# Patient Record
Sex: FEMALE | Race: WHITE | Employment: OTHER | RURAL
[De-identification: names, ages, dates, MRNs, and addresses within clinical notes are randomized per-mention and may not be internally consistent; named-entity substitution may affect disease eponyms.]

---

## 2017-09-27 LAB
CREATININE, EXTERNAL: NORMAL
HBA1C MFR BLD HPLC: NORMAL %
LDL-C, EXTERNAL: NORMAL

## 2017-12-22 ENCOUNTER — OFFICE VISIT (OUTPATIENT)
Dept: INTERNAL MEDICINE CLINIC | Age: 52
End: 2017-12-22

## 2017-12-22 VITALS
WEIGHT: 175 LBS | HEART RATE: 69 BPM | SYSTOLIC BLOOD PRESSURE: 122 MMHG | HEIGHT: 63 IN | DIASTOLIC BLOOD PRESSURE: 72 MMHG | RESPIRATION RATE: 18 BRPM | TEMPERATURE: 97.3 F | OXYGEN SATURATION: 99 % | BODY MASS INDEX: 31.01 KG/M2

## 2017-12-22 DIAGNOSIS — E03.9 ACQUIRED HYPOTHYROIDISM: ICD-10-CM

## 2017-12-22 DIAGNOSIS — L08.9 INFECTION OF THUMB: Primary | ICD-10-CM

## 2017-12-22 DIAGNOSIS — Z76.89 ENCOUNTER TO ESTABLISH CARE: ICD-10-CM

## 2017-12-22 DIAGNOSIS — R31.21 ASYMPTOMATIC MICROSCOPIC HEMATURIA: ICD-10-CM

## 2017-12-22 LAB
BILIRUB UR QL STRIP: NEGATIVE
GLUCOSE UR-MCNC: NEGATIVE MG/DL
KETONES P FAST UR STRIP-MCNC: NEGATIVE MG/DL
PH UR STRIP: 6.5 [PH] (ref 4.6–8)
PROT UR QL STRIP: NEGATIVE
SP GR UR STRIP: 1.01 (ref 1–1.03)
UA UROBILINOGEN AMB POC: NORMAL (ref 0.2–1)
URINALYSIS CLARITY POC: CLEAR
URINALYSIS COLOR POC: YELLOW
URINE BLOOD POC: NORMAL
URINE LEUKOCYTES POC: NEGATIVE
URINE NITRITES POC: NEGATIVE

## 2017-12-22 RX ORDER — FLUTICASONE PROPIONATE 50 MCG
SPRAY, SUSPENSION (ML) NASAL
Refills: 2 | COMMUNITY
Start: 2017-10-30 | End: 2018-05-22 | Stop reason: SDUPTHER

## 2017-12-22 RX ORDER — LAMOTRIGINE 200 MG/1
TABLET ORAL
Refills: 0 | COMMUNITY
Start: 2017-12-01 | End: 2019-01-30 | Stop reason: SDUPTHER

## 2017-12-22 RX ORDER — TRIAMCINOLONE ACETONIDE 1 MG/G
PASTE DENTAL
Refills: 0 | COMMUNITY
Start: 2017-11-17 | End: 2017-12-22 | Stop reason: ALTCHOICE

## 2017-12-22 RX ORDER — CEPHALEXIN 500 MG/1
500 CAPSULE ORAL 2 TIMES DAILY
Qty: 20 CAP | Refills: 0 | Status: SHIPPED | OUTPATIENT
Start: 2017-12-22 | End: 2018-01-01

## 2017-12-22 RX ORDER — LOSARTAN POTASSIUM 25 MG/1
TABLET ORAL
Refills: 0 | COMMUNITY
Start: 2017-12-12 | End: 2018-03-06 | Stop reason: SDUPTHER

## 2017-12-22 NOTE — PROGRESS NOTES
HISTORY OF PRESENT ILLNESS  Silver Weir is a 47 y.o. female. HPI     Chief Complaint   Patient presents with    New Patient     ESTABLISH CARE    Finger Swelling     X 2 WEEKS;  POSSIBLY SPLINTER; REDNESS; SORE     Last PCP  Dr. 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 Emery Lobato [facility].  Migraine     2014 - 2015    PTSD (post-traumatic stress disorder)     2014 - 2015    Tendonitis     Both feet/    Thyroid disease     20 year ago     Social History     Social History    Marital status:      Spouse name: N/A    Number of children: N/A    Years of education: N/A     Occupational History    Not on file. Social History Main Topics    Smoking status: Never Smoker    Smokeless tobacco: Never Used    Alcohol use No    Drug use: Yes     Special: Prescription, OTC    Sexual activity: No     Other Topics Concern    Not on file     Social History Narrative     Review of Systems   Constitutional: Negative for chills, fever and malaise/fatigue. HENT: Negative. Eyes: Negative. Respiratory: Negative. Negative for cough, sputum production, shortness of breath and wheezing. Cardiovascular: Negative. Negative for chest pain and leg swelling. Gastrointestinal: Negative for constipation, diarrhea, nausea and vomiting. Genitourinary: Negative. Musculoskeletal: Negative. Skin:        Right thumb swollen, painful , thinks had splinter in it. Neurological: Negative. Physical Exam   Constitutional: She is oriented to person, place, and time. She appears well-developed and well-nourished. No distress.    HENT:   Head: Normocephalic and atraumatic. Eyes: Conjunctivae are normal.   Cardiovascular: Normal rate, regular rhythm, normal heart sounds and intact distal pulses. Exam reveals no gallop and no friction rub. No murmur heard. Pulmonary/Chest: Effort normal and breath sounds normal. No respiratory distress. She has no wheezes. She has no rales. Musculoskeletal: Normal range of motion. She exhibits no edema, tenderness or deformity. Neurological: She is alert and oriented to person, place, and time. Skin: Skin is warm and dry. She is not diaphoretic. There is erythema. Positive swelling, pain , erythema and warmth right    Nursing note and vitals reviewed. Plan of care and AVS reviewed with patient who verbalized understanding. ASSESSMENT and PLAN    ICD-10-CM ICD-9-CM    1. Infection of thumb L08.9 686.9 cephALEXin (KEFLEX) 500 mg capsule   2. Asymptomatic microscopic hematuria R31.21 599.72 CULTURE, URINE   3. Encounter to establish care Z76.89 V65.8 AMB POC URINALYSIS DIP STICK MANUAL W/O MICRO   4. Acquired hypothyroidism E03.9 244.9 TSH 3RD GENERATION   Will notify patient of lab results.   Started Keflex 500 mg.  F/U 2 months physical exam.

## 2017-12-22 NOTE — PROGRESS NOTES
Chief Complaint   Patient presents with    New Patient     ESTABLISH CARE    Finger Swelling     X 2 WEEKS;  POSSIBLY SPLINTER; REDNESS; SORE     Health Maintenance Due   Topic Date Due    Hepatitis C Screening  1965    DTaP/Tdap/Td series (1 - Tdap) 07/21/1985    PAP AKA CERVICAL CYTOLOGY  07/21/1985    FOBT Q 1 YEAR AGE 50-75  07/21/2014    Influenza Age 9 to Adult  08/01/2017     Patient had flu vaccine and pap at [facility] ([facility])    Last PCP:  Dr. Terrell Corona  [address] [place] [address]  P: [phone]  F: [phone]     Do you have an [address] in place in the event that you have a healthcare crisis that could impact your decision making as it pertains to your health? NO    Would you like information about Advance Care Planning? NO    Information given.  NO

## 2017-12-22 NOTE — MR AVS SNAPSHOT
Visit Information Date & Time Provider Department Dept. Phone Encounter #  
 12/22/2017  9:30 AM Carolyn Reza, 1 [facility] [id number] Follow-up Instructions Return in about 2 months (around 2/22/2018) for Physical Exam. Upcoming Health Maintenance Date Due Hepatitis C Screening 1965 DTaP/Tdap/Td series (1 - Tdap) 7/21/1985 PAP AKA CERVICAL CYTOLOGY 7/21/1985 FOBT Q 1 YEAR AGE 50-75 7/21/2014 Allergies as of 12/22/2017  Review Complete On: 12/22/2017 By: Carolyn Reza NP Severity Noted Reaction Type Reactions Penicillins  10/23/2017    Rash Current Immunizations  Never Reviewed No immunizations on file. Not reviewed this visit You Were Diagnosed With   
  
 Codes Comments Asymptomatic microscopic hematuria    -  Primary ICD-10-CM: R31.21 
ICD-9-CM: 599.72 Encounter to establish care     ICD-10-CM: Z76.89 
ICD-9-CM: V65.8 Acquired hypothyroidism     ICD-10-CM: E03.9 ICD-9-CM: 209. 9 Vitals BP Pulse Temp Resp Height(growth percentile) 122/72 (BP 1 Location: Left arm, BP Patient Position: Sitting) 69 97.3 °F (36.3 °C) (Temporal) 18 5' 3\" (1.6 m) Weight(growth percentile) SpO2 BMI OB Status Smoking Status 175 lb (79.4 kg) 99% 31 kg/m2 Postmenopausal Never Smoker BMI and BSA Data Body Mass Index Body Surface Area  
 31 kg/m 2 1.88 m 2 Preferred Pharmacy Pharmacy Name Phone [facility] [address] [place] [address], [address] [place] [address] [address] Your Updated Medication List  
  
   
This list is accurate as of: 12/22/17 10:07 AM.  Always use your most recent med list.  
  
  
  
  
 AMBIEN 10 mg tablet Generic drug:  zolpidem Take 10 mg by mouth nightly as needed for Sleep. atorvastatin 20 mg tablet Commonly known as:  LIPITOR Take 20 mg by mouth daily. busPIRone 30 mg tablet Commonly known as:  BUSPAR  
 Take 30 mg by mouth two (2) times a day. fluticasone 50 mcg/actuation nasal spray Commonly known as:  FLONASE  
  
 GEODON 20 mg capsule Generic drug:  ziprasidone Take 20 mg by mouth two (2) times daily (with meals). * lamoTRIgine 100 mg tablet Commonly known as: LaMICtal  
Take 300 mg by mouth daily. * lamoTRIgine 200 mg tablet Commonly known as: LaMICtal  
take 1 tablet by mouth once daily TAKE WITH 100 milligram tablet  
  
 levothyroxine 75 mcg tablet Commonly known as:  SYNTHROID Take 75 mcg by mouth Daily (before breakfast). losartan 25 mg tablet Commonly known as:  COZAAR  
take 1 tablet by mouth once daily VIIBRYD 40 mg Tab tablet Generic drug:  vilazodone Take 40 mg by mouth daily. * Notice: This list has 2 medication(s) that are the same as other medications prescribed for you. Read the directions carefully, and ask your doctor or other care provider to review them with you. We Performed the Following AMB POC URINALYSIS DIP STICK MANUAL W/O MICRO [57741 CPT(R)] CULTURE, URINE [id number] CPT(R)] TSH 3RD GENERATION [43644 CPT(R)] Follow-up Instructions Return in about 2 months (around 2/22/2018) for Physical Exam.  
  
  
Introducing [facility]! [facility] introduces [employer] patient portal. Now you can access parts of your medical record, email your doctor's office, and request medication refills online. 1. In your internet browser, go to [email or web address] 2. Click on the First Time User? Click Here link in the Sign In box. You will see the New Member Sign Up page. 3. Enter your [employer] Access Code exactly as it appears below. You will not need to use this code after youve completed the sign-up process. If you do not sign up before the expiration date, you must request a new code. · [employer] Access Code: [email or web address] Expires: 1/21/2018  7:42 PM 
 
 4. Enter the last four digits of your Social Security Number (xxxx) and Date of Birth (mm/dd/yyyy) as indicated and click Submit. You will be taken to the next sign-up page. 5. Create a "[employer] ID. This will be your "[employer] login ID and cannot be changed, so think of one that is secure and easy to remember. 6. Create a "[employer] password. You can change your password at any time. 7. Enter your Password Reset Question and Answer. This can be used at a later time if you forget your password. 8. Enter your e-mail address. You will receive e-mail notification when new information is available in [address] 9. Click Sign Up. You can now view and download portions of your medical record. 10. Click the Download Summary menu link to download a portable copy of your medical information. If you have questions, please visit the Frequently Asked Questions section of the "[employer] website. Remember, "[employer] is NOT to be used for urgent needs. For medical emergencies, dial 911. Now available from your iPhone and Android! Please provide this summary of care documentation to your next provider. Your primary care clinician is listed as Raz Poole. If you have any questions after today's visit, please call [phone].

## 2017-12-24 LAB
BACTERIA UR CULT: NORMAL
TSH SERPL DL<=0.005 MIU/L-ACNC: 8.36 UIU/ML (ref 0.45–4.5)

## 2017-12-26 DIAGNOSIS — E03.9 ACQUIRED HYPOTHYROIDISM: Primary | ICD-10-CM

## 2017-12-26 RX ORDER — LEVOTHYROXINE SODIUM 88 UG/1
88 TABLET ORAL
Qty: 30 TAB | Refills: 1 | Status: SHIPPED | OUTPATIENT
Start: 2017-12-26 | End: 2018-02-02 | Stop reason: SDUPTHER

## 2017-12-26 NOTE — PROGRESS NOTES
Discussed with patient lab results.   Thy;roid medication increased and patient to come in 6 weeks to get repeat TSH

## 2017-12-29 DIAGNOSIS — G43.909 MIGRAINE WITHOUT STATUS MIGRAINOSUS, NOT INTRACTABLE, UNSPECIFIED MIGRAINE TYPE: Primary | ICD-10-CM

## 2017-12-29 RX ORDER — RIZATRIPTAN BENZOATE 10 MG/1
10 TABLET ORAL
Qty: 9 TAB | Refills: 0 | Status: SHIPPED | OUTPATIENT
Start: 2017-12-29 | End: 2018-05-22 | Stop reason: SDUPTHER

## 2018-01-04 ENCOUNTER — TELEPHONE (OUTPATIENT)
Dept: INTERNAL MEDICINE CLINIC | Age: 53
End: 2018-01-04

## 2018-01-04 NOTE — TELEPHONE ENCOUNTER
Patient called and I advised her that an appointment would need to be scheduled to be evaluated for the back pain by Dr Dm Easley [facility] - appt scheduled for 1/5/18  Ibeth Oconnor LPN  2/5/8549  8:93 PM

## 2018-01-04 NOTE — TELEPHONE ENCOUNTER
----- Message from Lana Morataya sent at 1/4/2018  8:08 AM EST -----  Regarding: NP Lopez/Telephone  Pt requesting for a muscle relaxer to be sent to pharmacy due to falling on back. Pt would like to speak to the nurse or doctor before making an appt. In addition, pt states that she is in excruciating pain and unable to sleep.     Best contact number: [phone]  [facility]: [phone]

## 2018-01-08 ENCOUNTER — OFFICE VISIT (OUTPATIENT)
Dept: INTERNAL MEDICINE CLINIC | Age: 53
End: 2018-01-08

## 2018-01-08 VITALS
TEMPERATURE: 97.2 F | HEIGHT: 63 IN | OXYGEN SATURATION: 96 % | WEIGHT: 174 LBS | DIASTOLIC BLOOD PRESSURE: 77 MMHG | HEART RATE: 72 BPM | SYSTOLIC BLOOD PRESSURE: 116 MMHG | RESPIRATION RATE: 17 BRPM | BODY MASS INDEX: 30.83 KG/M2

## 2018-01-08 DIAGNOSIS — M79.89 THUMB SWELLING: Primary | ICD-10-CM

## 2018-01-08 RX ORDER — DOXYCYCLINE 100 MG/1
100 TABLET ORAL 2 TIMES DAILY
Qty: 20 TAB | Refills: 0 | Status: SHIPPED | OUTPATIENT
Start: 2018-01-08 | End: 2018-01-18

## 2018-01-08 NOTE — PROGRESS NOTES
HISTORY OF PRESENT ILLNESS  Gi Tam is a 47 y.o. female. HPI  Chief Complaint   Patient presents with    Finger Swelling     LEFT THUMB     States took all medication for infection;  States still having swelling in right thumb  States tried poking to make drainage to come out. States no drainage comes out. States is still sore. States still not sure how injured finger. Review of Systems   Constitutional: Negative. Negative for chills, fever and malaise/fatigue. HENT: Negative. Eyes: Negative. Respiratory: Negative. Negative for cough and shortness of breath. Cardiovascular: Negative. Negative for chest pain and leg swelling. Gastrointestinal: Negative. Negative for abdominal pain, constipation, diarrhea, nausea and vomiting. Genitourinary: Negative. Musculoskeletal: Negative. Skin: Negative. Neurological: Negative. Physical Exam   Constitutional: She is oriented to person, place, and time. She appears well-developed and well-nourished. No distress. HENT:   Head: Normocephalic and atraumatic. Eyes: Conjunctivae are normal.   Cardiovascular: Normal rate, regular rhythm and intact distal pulses. Exam reveals no gallop and no friction rub. No murmur heard. Pulmonary/Chest: Effort normal and breath sounds normal. No respiratory distress. She has no wheezes. She has no rales. Musculoskeletal: Normal range of motion. Neurological: She is alert and oriented to person, place, and time. Skin: Skin is warm and dry. She is not diaphoretic. Psychiatric: She has a normal mood and affect. Her behavior is normal. Thought content normal.   Nursing note and vitals reviewed. Plan of care and AVS reviewed with patient who verbalized understanding. ASSESSMENT and PLAN    ICD-10-CM ICD-9-CM    1.  Thumb swelling M79.89 729.81 CULTURE, ANAEROBIC AND AEROBIC      doxycycline (ADOXA) 100 mg tablet      XR THUMB RT MIN 2 V      SPECIMEN HANDLING, OFF->LAB   Will notify patient of culture results. Discussed with patient use of doxycycline. Will notify patient of lab results. Discussed the patient's BMI with her. The BMI follow up plan is as follows:     dietary management education, guidance, and counseling  encourage exercise  monitor weight  prescribed dietary intake    An After Visit Summary was printed and given to the patient.

## 2018-01-08 NOTE — PROGRESS NOTES
Chief Complaint   Patient presents with    Finger Swelling     LEFT THUMB     1. Have you been to the ER, urgent care clinic since your last visit? Hospitalized since your last visit? No    2. Have you seen or consulted any other health care providers outside of the [address] [place] [address] since your last visit? Include any pap smears or colon screening. No     Health Maintenance Due   Topic Date Due    BREAST CANCER SCRN MAMMOGRAM  07/21/2014     Do you have an [address] in place in the event that you have a healthcare crisis that could impact your decision making as it pertains to your health? NO    Would you like information about Advance Care Planning? NO    Information given.  NO

## 2018-01-08 NOTE — MR AVS SNAPSHOT
Visit Information Date & Time Provider Department Dept. Phone Encounter #  
 1/8/2018  9:00 AM Boy Oglesby, 1 [facility] [phone] Your Appointments 2/22/2018 10:30 AM  
PHYSICAL PRE OP with MENG Johnson Mary (SHERLYN SCHEDULING) Appt Note: physical $0 cp lsh 12/22/17  
 [address] [place] [address]  
[phone]  
  
   
 [address] Upcoming Health Maintenance Date Due  
 BREAST CANCER SCRN MAMMOGRAM 7/21/2014 PAP AKA CERVICAL CYTOLOGY 2/15/2018* FOBT Q 1 YEAR AGE 50-75 3/30/2018* DTaP/Tdap/Td series (2 - Td) 12/22/2027 *Topic was postponed. The date shown is not the original due date. Allergies as of 1/8/2018  Review Complete On: 1/8/2018 By: Boy Oglesby NP Severity Noted Reaction Type Reactions Penicillins  10/23/2017    Rash Current Immunizations  Never Reviewed No immunizations on file. Not reviewed this visit You Were Diagnosed With   
  
 Codes Comments Thumb swelling    -  Primary ICD-10-CM: M79.89 ICD-9-CM: 729.81 Vitals BP Pulse Temp Resp Height(growth percentile) 116/77 (BP 1 Location: Left arm, BP Patient Position: Sitting) 72 97.2 °F (36.2 °C) (Temporal) 17 5' 3\" (1.6 m) Weight(growth percentile) SpO2 BMI OB Status Smoking Status 174 lb (78.9 kg) 96% 30.82 kg/m2 Postmenopausal Never Smoker Vitals History BMI and BSA Data Body Mass Index Body Surface Area  
 30.82 kg/m 2 1.87 m 2 Preferred Pharmacy Pharmacy Name Phone [facility] [address] [place] [address] [facility] [address] Your Updated Medication List  
  
   
This list is accurate as of: 1/8/18  9:36 AM.  Always use your most recent med list.  
  
  
  
  
 AMBIEN 10 mg tablet Generic drug:  zolpidem Take 10 mg by mouth nightly as needed for Sleep. atorvastatin 20 mg tablet Commonly known as:  LIPITOR Take 20 mg by mouth daily. busPIRone 30 mg tablet Commonly known as:  BUSPAR Take 30 mg by mouth two (2) times a day. doxycycline 100 mg tablet Commonly known as:  ADOXA Take 1 Tab by mouth two (2) times a day for 10 days. fluticasone 50 mcg/actuation nasal spray Commonly known as:  FLONASE  
  
 GEODON 20 mg capsule Generic drug:  ziprasidone Take 20 mg by mouth two (2) times daily (with meals). * lamoTRIgine 100 mg tablet Commonly known as: LaMICtal  
Take 300 mg by mouth daily. * lamoTRIgine 200 mg tablet Commonly known as: LaMICtal  
take 1 tablet by mouth once daily TAKE WITH 100 milligram tablet  
  
 levothyroxine 88 mcg tablet Commonly known as:  SYNTHROID Take 1 Tab by mouth Daily (before breakfast). losartan 25 mg tablet Commonly known as:  COZAAR  
take 1 tablet by mouth once daily  
  
 rizatriptan 10 mg tablet Commonly known as:  Daved Better Take 1 Tab by mouth once as needed for Migraine for up to 1 dose. May repeat in 2 hours if needed VIIBRYD 40 mg Tab tablet Generic drug:  vilazodone Take 40 mg by mouth daily. * Notice: This list has 2 medication(s) that are the same as other medications prescribed for you. Read the directions carefully, and ask your doctor or other care provider to review them with you. Prescriptions Sent to Pharmacy Refills  
 doxycycline (ADOXA) 100 mg tablet 0 Sig: Take 1 Tab by mouth two (2) times a day for 10 days. Class: Normal  
 Pharmacy: [id number] [place] [address] Ph #: [phone] Route: Oral  
  
We Performed the Following CULTURE, ANAEROBIC AND AEROBIC [id number] CPT(R)] To-Do List   
 01/08/2018 Imaging:  XR THUMB RT MIN 2 V Introducing [facility]! Dear Gabino Morganter: Thank you for requesting a [employer] account. Our records indicate that you already have an active [employer] Here account. You can access your account anytime at [email or web address] Did you know that you can access your hospital and ER discharge instructions at any time in [employer] Here? You can also review all of your test results from your hospital stay or ER visit. Additional Information If you have questions, please visit the Frequently Asked Questions section of the [employer] Here website at [email or web address] Remember, [employer] is NOT to be used for urgent needs. For medical emergencies, dial 911. Now available from your iPhone and Android! Please provide this summary of care documentation to your next provider. Your primary care clinician is listed as Verona Boateng. If you have any questions after today's visit, please call [phone].

## 2018-01-08 NOTE — PATIENT INSTRUCTIONS
Body Mass Index: Care Instructions  Your Care Instructions    Body mass index (BMI) can help you see if your weight is raising your risk for health problems. It uses a formula to compare how much you weigh with how tall you are. · A BMI lower than 18.5 is considered underweight. · A BMI between 18.5 and 24.9 is considered healthy. · A BMI between 25 and 29.9 is considered overweight. A BMI of 30 or higher is considered obese. If your BMI is in the normal range, it means that you have a lower risk for weight-related health problems. If your BMI is in the overweight or obese range, you may be at increased risk for weight-related health problems, such as high blood pressure, heart disease, stroke, arthritis or joint pain, and diabetes. If your BMI is in the underweight range, you may be at increased risk for health problems such as fatigue, lower protection (immunity) against illness, muscle loss, bone loss, hair loss, and hormone problems. BMI is just one measure of your risk for weight-related health problems. You may be at higher risk for health problems if you are not active, you eat an unhealthy diet, or you drink too much alcohol or use tobacco products. Follow-up care is a key part of your treatment and safety. Be sure to make and go to all appointments, and call your doctor if you are having problems. It's also a good idea to know your test results and keep a list of the medicines you take. How can you care for yourself at home? · Practice healthy eating habits. This includes eating plenty of fruits, vegetables, whole grains, lean protein, and low-fat dairy. · If your doctor recommends it, get more exercise. Walking is a good choice. Bit by bit, increase the amount you walk every day. Try for at least 30 minutes on most days of the week. · Do not smoke. Smoking can increase your risk for health problems. If you need help quitting, talk to your doctor about stop-smoking programs and medicines. These can increase your chances of quitting for good. · Limit alcohol to 2 drinks a day for men and 1 drink a day for women. Too much alcohol can cause health problems. If you have a BMI higher than 25  · Your doctor may do other tests to check your risk for weight-related health problems. This may include measuring the distance around your waist. A waist measurement of more than 40 inches in men or 35 inches in women can increase the risk of weight-related health problems. · Talk with your doctor about steps you can take to stay healthy or improve your health. You may need to make lifestyle changes to lose weight and stay healthy, such as changing your diet and getting regular exercise. If you have a BMI lower than 18.5  · Your doctor may do other tests to check your risk for health problems. · Talk with your doctor about steps you can take to stay healthy or improve your health. You may need to make lifestyle changes to gain or maintain weight and stay healthy, such as getting more healthy foods in your diet and doing exercises to build muscle. Where can you learn more? Go to http://alivia-maritza.info/. Enter S176 in the search box to learn more about \"Body Mass Index: Care Instructions. \"  Current as of: October 13, 2016  Content Version: 11.4  © 5527-8023 [employer]. Care instructions adapted under license by [employer] (which disclaims liability or warranty for this information). If you have questions about a medical condition or this instruction, always ask your healthcare professional. [address] any warranty or liability for your use of this information.

## 2018-01-12 ENCOUNTER — OFFICE VISIT (OUTPATIENT)
Dept: INTERNAL MEDICINE CLINIC | Age: 53
End: 2018-01-12

## 2018-01-12 ENCOUNTER — TELEPHONE (OUTPATIENT)
Dept: INTERNAL MEDICINE CLINIC | Age: 53
End: 2018-01-12

## 2018-01-12 VITALS
RESPIRATION RATE: 18 BRPM | TEMPERATURE: 97.6 F | HEART RATE: 66 BPM | DIASTOLIC BLOOD PRESSURE: 69 MMHG | WEIGHT: 175.2 LBS | OXYGEN SATURATION: 98 % | HEIGHT: 63 IN | SYSTOLIC BLOOD PRESSURE: 121 MMHG | BODY MASS INDEX: 31.04 KG/M2

## 2018-01-12 DIAGNOSIS — L08.9 INFECTION OF THUMB: Primary | ICD-10-CM

## 2018-01-12 LAB
BACTERIA SPEC AEROBE CULT: ABNORMAL
BACTERIA SPEC ANAEROBE CULT: ABNORMAL

## 2018-01-12 NOTE — TELEPHONE ENCOUNTER
Pt called in reference to being referred to a wound care specialist. They are out of network for her. She said Dr. Gayatri Quarles is in her network. Please call her at [phone].

## 2018-01-12 NOTE — MR AVS SNAPSHOT
Visit Information Date & Time Provider Department Dept. Phone Encounter #  
 1/12/2018 11:15 AM Maxcine Ahumada, NP [facility] [id number] Follow-up Instructions Return in about 2 weeks (around 1/26/2018) for wound care. Your Appointments 2/22/2018 10:30 AM  
PHYSICAL PRE OP with Maxcine Ahumada, NP Julio Cesar [facility] (SHERLYN SCHEDULING) Appt Note: physical $0 cp lsh 12/22/17  
 [address] [facility] [place] [address]  
[phone]  
  
   
 [address] Upcoming Health Maintenance Date Due  
 PAP AKA CERVICAL CYTOLOGY 2/15/2018* BREAST CANCER SCRN MAMMOGRAM 3/30/2018* FOBT Q 1 YEAR AGE 50-75 3/30/2018* DTaP/Tdap/Td series (2 - Td) 12/22/2027 *Topic was postponed. The date shown is not the original due date. Allergies as of 1/12/2018  Review Complete On: 1/12/2018 By: Maxcine Ahumada, NP Severity Noted Reaction Type Reactions Penicillins  10/23/2017    Rash Current Immunizations  Never Reviewed No immunizations on file. Not reviewed this visit You Were Diagnosed With   
  
 Codes Comments Infection of thumb    -  Primary ICD-10-CM: L08.9 ICD-9-CM: 797. 9 Vitals BP Pulse Temp Resp Height(growth percentile) 121/69 (BP 1 Location: Left arm, BP Patient Position: Sitting) 66 97.6 °F (36.4 °C) (Temporal) 18 5' 3\" (1.6 m) Weight(growth percentile) SpO2 BMI OB Status Smoking Status 175 lb 3.2 oz (79.5 kg) 98% 31.04 kg/m2 Postmenopausal Never Smoker Vitals History BMI and BSA Data Body Mass Index Body Surface Area 31.04 kg/m 2 1.88 m 2 Preferred Pharmacy Pharmacy Name Phone [facility] [address] [facility], 1 [facility] [address] Your Updated Medication List  
  
   
This list is accurate as of: 1/12/18 11:52 AM.  Always use your most recent med list.  
  
 AMBIEN 10 mg tablet Generic drug:  zolpidem Take 10 mg by mouth nightly as needed for Sleep. atorvastatin 20 mg tablet Commonly known as:  LIPITOR Take 20 mg by mouth daily. busPIRone 30 mg tablet Commonly known as:  BUSPAR Take 30 mg by mouth two (2) times a day. doxycycline 100 mg tablet Commonly known as:  ADOXA Take 1 Tab by mouth two (2) times a day for 10 days. fluticasone 50 mcg/actuation nasal spray Commonly known as:  FLONASE  
  
 GEODON 20 mg capsule Generic drug:  ziprasidone Take 20 mg by mouth two (2) times daily (with meals). * lamoTRIgine 100 mg tablet Commonly known as: LaMICtal  
Take 300 mg by mouth daily. * lamoTRIgine 200 mg tablet Commonly known as: LaMICtal  
take 1 tablet by mouth once daily TAKE WITH 100 milligram tablet  
  
 levothyroxine 88 mcg tablet Commonly known as:  SYNTHROID Take 1 Tab by mouth Daily (before breakfast). losartan 25 mg tablet Commonly known as:  COZAAR  
take 1 tablet by mouth once daily  
  
 rizatriptan 10 mg tablet Commonly known as:  Aniceto Massed Take 1 Tab by mouth once as needed for Migraine for up to 1 dose. May repeat in 2 hours if needed VIIBRYD 40 mg Tab tablet Generic drug:  vilazodone Take 40 mg by mouth daily. * Notice: This list has 2 medication(s) that are the same as other medications prescribed for you. Read the directions carefully, and ask your doctor or other care provider to review them with you. We Performed the Following REFERRAL TO WOUND CARE [RWP437 Custom] Comments: To evaluate and treat thumb infection right hand. Follow-up Instructions Return in about 2 weeks (around 1/26/2018) for wound care. To-Do List   
 01/22/2018 2:30 PM  
  Appointment with Therese Hwang MD; [facility] 1 at [address] [place] [address]. ([phone]) Referral Information Referral ID Referred By Referred To [id number] [address], [address] Candance Pan, M.D. [facility] [address] [place], [address] [facility] Visits Status Start Date End Date 1 New Request 1/12/18 1/12/19 If your referral has a status of pending review or denied, additional information will be sent to support the outcome of this decision. Introducing [facility]! Dear Mukul Onofre: Thank you for requesting a [employer] account. Our records indicate that you already have an active [employer] account. You can access your account anytime at [email or web address] Did you know that you can access your hospital and ER discharge instructions at any time in [employer]? You can also review all of your test results from your hospital stay or ER visit. Additional Information If you have questions, please visit the Frequently Asked Questions section of the [employer] website at [email or web address] Remember, [employer] is NOT to be used for urgent needs. For medical emergencies, dial 911. Now available from your iPhone and Android! Please provide this summary of care documentation to your next provider. Your primary care clinician is listed as Robert Agee. If you have any questions after today's visit, please call [phone].

## 2018-01-12 NOTE — PROGRESS NOTES
Chief Complaint   Patient presents with    Finger Swelling     LEFT THUMB SWELLING X 3 WEEKS     1. Have you been to the ER, urgent care clinic since your last visit? Hospitalized since your last visit? No    2. Have you seen or consulted any other health care providers outside of the [address] [place] [address] since your last visit? Include any pap smears or colon screening. No     There are no preventive care reminders to display for this patient. Do you have an [address] in place in the event that you have a healthcare crisis that could impact your decision making as it pertains to your health? NO    Would you like information about Advance Care Planning? NO    Information given.  NO

## 2018-01-12 NOTE — PATIENT INSTRUCTIONS
Advised to complete ABT. Discussed prelim culture results. Body Mass Index: Care Instructions  Your Care Instructions    Body mass index (BMI) can help you see if your weight is raising your risk for health problems. It uses a formula to compare how much you weigh with how tall you are. · A BMI lower than 18.5 is considered underweight. · A BMI between 18.5 and 24.9 is considered healthy. · A BMI between 25 and 29.9 is considered overweight. A BMI of 30 or higher is considered obese. If your BMI is in the normal range, it means that you have a lower risk for weight-related health problems. If your BMI is in the overweight or obese range, you may be at increased risk for weight-related health problems, such as high blood pressure, heart disease, stroke, arthritis or joint pain, and diabetes. If your BMI is in the underweight range, you may be at increased risk for health problems such as fatigue, lower protection (immunity) against illness, muscle loss, bone loss, hair loss, and hormone problems. BMI is just one measure of your risk for weight-related health problems. You may be at higher risk for health problems if you are not active, you eat an unhealthy diet, or you drink too much alcohol or use tobacco products. Follow-up care is a key part of your treatment and safety. Be sure to make and go to all appointments, and call your doctor if you are having problems. It's also a good idea to know your test results and keep a list of the medicines you take. How can you care for yourself at home? · Practice healthy eating habits. This includes eating plenty of fruits, vegetables, whole grains, lean protein, and low-fat dairy. · If your doctor recommends it, get more exercise. Walking is a good choice. Bit by bit, increase the amount you walk every day. Try for at least 30 minutes on most days of the week. · Do not smoke. Smoking can increase your risk for health problems.  If you need help quitting, talk to your doctor about stop-smoking programs and medicines. These can increase your chances of quitting for good. · Limit alcohol to 2 drinks a day for men and 1 drink a day for women. Too much alcohol can cause health problems. If you have a BMI higher than 25  · Your doctor may do other tests to check your risk for weight-related health problems. This may include measuring the distance around your waist. A waist measurement of more than 40 inches in men or 35 inches in women can increase the risk of weight-related health problems. · Talk with your doctor about steps you can take to stay healthy or improve your health. You may need to make lifestyle changes to lose weight and stay healthy, such as changing your diet and getting regular exercise. If you have a BMI lower than 18.5  · Your doctor may do other tests to check your risk for health problems. · Talk with your doctor about steps you can take to stay healthy or improve your health. You may need to make lifestyle changes to gain or maintain weight and stay healthy, such as getting more healthy foods in your diet and doing exercises to build muscle. Where can you learn more? Go to http://alivia-maritza.info/. Enter S176 in the search box to learn more about \"Body Mass Index: Care Instructions. \"  Current as of: October 13, 2016  Content Version: 11.4  © 3652-2475 Healthwise, Incorporated. Care instructions adapted under license by [employer] (which disclaims liability or warranty for this information). If you have questions about a medical condition or this instruction, always ask your healthcare professional. [place] [address] any warranty or liability for your use of this information.

## 2018-01-12 NOTE — PROGRESS NOTES
HISTORY OF PRESENT ILLNESS  Hansel Higuera is a 47 y.o. female. HPI  Chief Complaint   Patient presents with    Finger Swelling     LEFT THUMB SWELLING X 3 WEEKS     Patient is with concern that new ABT is not being effective. Patient first seen on 12/22 for complaint of swollen thumb thinking got steel wool in finger. No foreign body noted. Given ABT keflex and xray. Keflex not effective, xray negative. Currently on doxycycline. Preliminary culture of thumb indicates Bacillus species. Review of Systems   Constitutional: Negative for chills and fever. Eyes: Negative. Respiratory: Negative. Negative for cough. Cardiovascular: Negative. Negative for chest pain. Gastrointestinal: Negative. Genitourinary: Negative. Musculoskeletal: Negative. Skin:        States is using thumb splint. States thumb is still swollen and painful  Denies drainage. Physical Exam   Constitutional: She is oriented to person, place, and time. She appears well-developed and well-nourished. No distress. HENT:   Head: Normocephalic and atraumatic. Eyes: Conjunctivae are normal.   Cardiovascular: Normal rate, regular rhythm, normal heart sounds and intact distal pulses. Exam reveals no gallop and no friction rub. No murmur heard. Pulmonary/Chest: Effort normal and breath sounds normal. No respiratory distress. She has no wheezes. Musculoskeletal: She exhibits edema and tenderness. Positive for edema right thumb  Positive for pain right thumb     Neurological: She is alert and oriented to person, place, and time. Skin: Skin is warm and dry. She is not diaphoretic. Psychiatric: She has a normal mood and affect. Her behavior is normal. Thought content normal.   Nursing note and vitals reviewed. Plan of care and AVS reviewed with patient who verbalized understanding. ASSESSMENT and PLAN    ICD-10-CM ICD-9-CM    1.  Infection of thumb L08.9 686.9 REFERRAL TO WOUND CARE   Wound clinic [facility] has accepted consult with  Patient. Will consider ortho referral.  Patient given date, time of appointment and will check to see if insurance will cover. Has F/u appointment in February. Discussed the patient's BMI with her. The BMI follow up plan is as follows:     dietary management education, guidance, and counseling  encourage exercise  monitor weight  prescribed dietary intake    An After Visit Summary was printed and given to the patient.

## 2018-01-12 NOTE — TELEPHONE ENCOUNTER
Chief Complaint   Patient presents with    Referral Follow Up     DR Torrey Fields     Informed patient that message was received. NP will print new referral order and leave at  for the patient to . Informed patient that she will have to cancel the appointment with Amanda Kimball and to schedule an appointment with Dr. Merry Henderson. Understanding verbalized.

## 2018-01-15 ENCOUNTER — DOCUMENTATION ONLY (OUTPATIENT)
Dept: INTERNAL MEDICINE CLINIC | Age: 53
End: 2018-01-15

## 2018-01-17 DIAGNOSIS — M79.644 THUMB PAIN, RIGHT: Primary | ICD-10-CM

## 2018-02-01 ENCOUNTER — CLINICAL SUPPORT (OUTPATIENT)
Dept: INTERNAL MEDICINE CLINIC | Age: 53
End: 2018-02-01

## 2018-02-01 VITALS
OXYGEN SATURATION: 99 % | RESPIRATION RATE: 18 BRPM | DIASTOLIC BLOOD PRESSURE: 56 MMHG | SYSTOLIC BLOOD PRESSURE: 130 MMHG | HEIGHT: 63 IN | TEMPERATURE: 97.7 F | HEART RATE: 72 BPM

## 2018-02-01 DIAGNOSIS — E03.9 ACQUIRED HYPOTHYROIDISM: ICD-10-CM

## 2018-02-01 NOTE — PROGRESS NOTES
Chief Complaint   Patient presents with    Labs Only     TSH     Blood drawn from left antecubital fossa. No negative reaction noted.

## 2018-02-02 ENCOUNTER — TELEPHONE (OUTPATIENT)
Dept: INTERNAL MEDICINE CLINIC | Age: 53
End: 2018-02-02

## 2018-02-02 ENCOUNTER — OFFICE VISIT (OUTPATIENT)
Dept: INTERNAL MEDICINE CLINIC | Age: 53
End: 2018-02-02

## 2018-02-02 VITALS
OXYGEN SATURATION: 98 % | HEART RATE: 69 BPM | HEIGHT: 63 IN | TEMPERATURE: 97.4 F | DIASTOLIC BLOOD PRESSURE: 73 MMHG | RESPIRATION RATE: 16 BRPM | BODY MASS INDEX: 31.22 KG/M2 | SYSTOLIC BLOOD PRESSURE: 120 MMHG | WEIGHT: 176.2 LBS

## 2018-02-02 DIAGNOSIS — J01.00 ACUTE MAXILLARY SINUSITIS, RECURRENCE NOT SPECIFIED: Primary | ICD-10-CM

## 2018-02-02 DIAGNOSIS — J30.9 ACUTE ALLERGIC RHINITIS, UNSPECIFIED SEASONALITY, UNSPECIFIED TRIGGER: ICD-10-CM

## 2018-02-02 DIAGNOSIS — R09.82 POST-NASAL DRIP: ICD-10-CM

## 2018-02-02 DIAGNOSIS — E03.9 ACQUIRED HYPOTHYROIDISM: ICD-10-CM

## 2018-02-02 DIAGNOSIS — E78.00 HIGH CHOLESTEROL: ICD-10-CM

## 2018-02-02 DIAGNOSIS — I10 ESSENTIAL HYPERTENSION: ICD-10-CM

## 2018-02-02 DIAGNOSIS — R09.81 NASAL SINUS CONGESTION: ICD-10-CM

## 2018-02-02 LAB — TSH SERPL DL<=0.005 MIU/L-ACNC: 11.26 UIU/ML (ref 0.45–4.5)

## 2018-02-02 RX ORDER — DOXYCYCLINE 100 MG/1
100 TABLET ORAL 2 TIMES DAILY
Qty: 20 TAB | Refills: 0 | Status: SHIPPED | OUTPATIENT
Start: 2018-02-02 | End: 2018-02-12

## 2018-02-02 RX ORDER — LEVOTHYROXINE SODIUM 100 UG/1
100 TABLET ORAL
Qty: 30 TAB | Refills: 1 | Status: SHIPPED | OUTPATIENT
Start: 2018-02-02 | End: 2018-02-22 | Stop reason: SDUPTHER

## 2018-02-02 RX ORDER — MONTELUKAST SODIUM 10 MG/1
10 TABLET ORAL DAILY
Qty: 30 TAB | Refills: 1 | Status: SHIPPED | OUTPATIENT
Start: 2018-02-02 | End: 2018-05-22 | Stop reason: ALTCHOICE

## 2018-02-02 RX ORDER — ZIPRASIDONE HYDROCHLORIDE 40 MG/1
CAPSULE ORAL
Refills: 0 | COMMUNITY
Start: 2018-01-15 | End: 2018-02-08 | Stop reason: ALTCHOICE

## 2018-02-02 RX ORDER — TRIAMCINOLONE ACETONIDE 1 MG/G
PASTE DENTAL
Refills: 0 | COMMUNITY
Start: 2018-01-30 | End: 2019-07-09

## 2018-02-02 RX ORDER — LEVOCETIRIZINE DIHYDROCHLORIDE 5 MG/1
5 TABLET, FILM COATED ORAL DAILY
Qty: 30 TAB | Refills: 5 | Status: SHIPPED | OUTPATIENT
Start: 2018-02-02 | End: 2018-03-14 | Stop reason: SDUPTHER

## 2018-02-02 NOTE — TELEPHONE ENCOUNTER
Pt would like a call back to adv what ms jesi wanted her to do in ref to her tsa level was up so she wanted to know what to do

## 2018-02-02 NOTE — MR AVS SNAPSHOT
[address] 1437 [facility] 
[phone] Patient: Mora Whaley MRN: [id number] :1965 Visit Information Date & Time Provider Department Dept. Phone Encounter #  
 2018  [facility] [id number] Your Appointments 2018 10:30 AM  
PHYSICAL PRE OP with MENG Ball (SHERLYN SCHEDULING) Appt Note: physical $0 cp lsh 17  
 [address] [place] [address]. [address] [facility] [address]  
[phone]  
  
   
 [address] [place] [address] [address] Upcoming Health Maintenance Date Due  
 PAP AKA CERVICAL CYTOLOGY 2/15/2018* FOBT Q 1 YEAR AGE 50-75 3/30/2018* BREAST CANCER SCRN MAMMOGRAM 2019 DTaP/Tdap/Td series (2 - Td) 2027 *Topic was postponed. The date shown is not the original due date. Allergies as of 2018  Review Complete On: 2018 By: Jaxon Otto LPN Severity Noted Reaction Type Reactions Penicillins  10/23/2017    Rash Current Immunizations  Never Reviewed No immunizations on file. Not reviewed this visit You Were Diagnosed With   
  
 Codes Comments Nasal sinus congestion    -  Primary ICD-10-CM: R09.81 ICD-9-CM: 478.19 Post-nasal drip     ICD-10-CM: R09.82 ICD-9-CM: 784.91 Acute maxillary sinusitis, recurrence not specified     ICD-10-CM: J01.00 ICD-9-CM: 461.0 High cholesterol     ICD-10-CM: E78.00 ICD-9-CM: 272.0 Essential hypertension     ICD-10-CM: I10 
ICD-9-CM: 401.9 Acute allergic rhinitis, unspecified seasonality, unspecified trigger     ICD-10-CM: J30.9 ICD-9-CM: 477.9 Vitals BP Pulse Temp Resp Height(growth percentile) Weight(growth percentile)  120/73 (BP 1 Location: Left arm, BP Patient Position: Sitting) 69 97.4 °F (36.3 °C) (Oral) 16 5' 3\" (1.6 m) 176 lb 3.2 oz (79.9 kg) SpO2 BMI OB Status Smoking Status 98% 31.21 kg/m2 Postmenopausal Never Smoker Vitals History BMI and BSA Data Body Mass Index Body Surface Area  
 31.21 kg/m 2 1.88 m 2 Preferred Pharmacy Pharmacy Name Phone [facility] [address] [facility], 1 [facility] [address] Your Updated Medication List  
  
   
This list is accurate as of: 2/2/18  9:41 AM.  Always use your most recent med list.  
  
  
  
  
 AMBIEN 10 mg tablet Generic drug:  zolpidem Take 10 mg by mouth nightly as needed for Sleep. atorvastatin 20 mg tablet Commonly known as:  LIPITOR Take 20 mg by mouth daily. busPIRone 30 mg tablet Commonly known as:  BUSPAR Take 30 mg by mouth two (2) times a day. doxycycline 100 mg tablet Commonly known as:  ADOXA Take 1 Tab by mouth two (2) times a day for 10 days. fluticasone 50 mcg/actuation nasal spray Commonly known as:  FLONASE  
  
 * GEODON 20 mg capsule Generic drug:  ziprasidone Take 80 mg by mouth two (2) times daily (with meals). * ziprasidone 40 mg capsule Commonly known as:  GEODON  
take 1 capsule twice a day * lamoTRIgine 100 mg tablet Commonly known as: LaMICtal  
Take 300 mg by mouth daily. * lamoTRIgine 200 mg tablet Commonly known as: LaMICtal  
take 1 tablet by mouth once daily TAKE WITH 100 milligram tablet  
  
 levocetirizine 5 mg tablet Commonly known as:  Larina Spring Take 1 Tab by mouth daily. levothyroxine 88 mcg tablet Commonly known as:  SYNTHROID Take 1 Tab by mouth Daily (before breakfast). losartan 25 mg tablet Commonly known as:  COZAAR  
take 1 tablet by mouth once daily  
  
 montelukast 10 mg tablet Commonly known as:  SINGULAIR Take 1 Tab by mouth daily. Indications: Allergic Rhinitis  
  
 rizatriptan 10 mg tablet Commonly known as:  Susana Sin Take 1 Tab by mouth once as needed for Migraine for up to 1 dose. May repeat in 2 hours if needed  
  
 triamcinolone acetonide 0.1 % dental paste Commonly known as:  KENALOG  
apply to affected area three times a day VIIBRYD 40 mg Tab tablet Generic drug:  vilazodone Take 40 mg by mouth daily. * Notice: This list has 4 medication(s) that are the same as other medications prescribed for you. Read the directions carefully, and ask your doctor or other care provider to review them with you. Prescriptions Sent to Pharmacy Refills  
 montelukast (SINGULAIR) 10 mg tablet 1 Sig: Take 1 Tab by mouth daily. Indications: Allergic Rhinitis Class: Normal  
 Pharmacy: [facility] [id number], [place] Ph #: [phone] Route: Oral  
 levocetirizine (XYZAL) 5 mg tablet 5 Sig: Take 1 Tab by mouth daily. Class: Normal  
 Pharmacy: [facility] [id number], [place] Ph #: [phone] Route: Oral  
 doxycycline (ADOXA) 100 mg tablet 0 Sig: Take 1 Tab by mouth two (2) times a day for 10 days. Class: Normal  
 [id number] [address], [place] Ph #: [phone] Route: Oral  
  
To-Do List   
 02/02/2018 Lab:  LIPID PANEL   
  
 02/02/2018 Lab:  METABOLIC PANEL, [facility]! Dear Javier Saliva: Thank you for requesting a [employer] account. Our records indicate that you already have an active [employer] account. You can access your account anytime at [email or web address] Did you know that you can access your hospital and ER discharge instructions at any time in [employer]? You can also review all of your test results from your hospital stay or ER visit. Additional Information If you have questions, please visit the Frequently Asked Questions section of the [employer] website at [email or web address] Remember, [employer] is NOT to be used for urgent needs. For medical emergencies, dial 911. Now available from your iPhone and Android! Please provide this summary of care documentation to your next provider. Your primary care clinician is listed as Leticiajosselyn Martínez. If you have any questions after today's visit, please call [phone].

## 2018-02-02 NOTE — TELEPHONE ENCOUNTER
Chief Complaint   Patient presents with    Results     TSH     Patient was informed that a results and note has been sent to [facility] and that her medication has been increased from 88 mcg to 100 mcg. Understanding verbalized.

## 2018-02-02 NOTE — PROGRESS NOTES
Chief Complaint   Patient presents with    Nasal Congestion     TRIED ZYRTEC, ALLEGRA, AND DIFFERENT ALLERGY MEDICATIONS; X 8 MONTHS TO A YEAR; TAKEN GUANFENISIN; DENIES SORE THROAT     1. Have you been to the ER, urgent care clinic since your last visit? Hospitalized since your last visit? No    2. Have you seen or consulted any other health care providers outside of the [address] [place] [address] since your last visit? Include any pap smears or colon screening. No     There are no preventive care reminders to display for this patient. Do you have an [address] in place in the event that you have a healthcare crisis that could impact your decision making as it pertains to your health? NO    Would you like information about Advance Care Planning? NO    Information given.  NO

## 2018-02-09 NOTE — PROGRESS NOTES
HISTORY OF PRESENT ILLNESS  Hunter King is a 47 y.o. female. HPI  Chief Complaint   Patient presents with    Nasal Congestion     TRIED ZYRTEC, ALLEGRA, AND DIFFERENT ALLERGY MEDICATIONS; X 8 MONTHS TO A YEAR; TAKEN GUANFENISIN; DENIES SORE THROAT     Today c/o sinus pain and postnasal drip. States is having runny eyes, nose. States OTC medications are not effective. Patient in agreement with labs that are due. Review of Systems   Constitutional: Negative for chills and fever. HENT: Positive for congestion and sinus pain. Eyes: Negative. Respiratory: Positive for cough. Negative for shortness of breath and wheezing. Cardiovascular: Negative. Negative for chest pain and leg swelling. Gastrointestinal: Negative for abdominal pain, diarrhea, nausea and vomiting. Genitourinary: Negative. Skin: Negative. Physical Exam   Constitutional: She is oriented to person, place, and time. She appears well-developed and well-nourished. No distress. HENT:   Head: Normocephalic and atraumatic. Positive for pain with palpation of maxillary and frontal sinuses  Positive for postnasal drip   Eyes: Conjunctivae are normal.   Cardiovascular: Normal rate, regular rhythm, normal heart sounds and intact distal pulses. Exam reveals no gallop and no friction rub. No murmur heard. Pulmonary/Chest: Effort normal and breath sounds normal. No respiratory distress. She has no wheezes. She has no rales. Musculoskeletal: Normal range of motion. Neurological: She is alert and oriented to person, place, and time. Skin: Skin is warm and dry. She is not diaphoretic. Nursing note and vitals reviewed. Plan of care and AVS reviewed with patient who verbalized understanding. ASSESSMENT and PLAN    ICD-10-CM ICD-9-CM    1. Acute maxillary sinusitis, recurrence not specified J01.00 461.0 doxycycline (ADOXA) 100 mg tablet   2. Nasal sinus congestion R09.81 478.19 levocetirizine (XYZAL) 5 mg tablet   3. Post-nasal drip R09.82 784.91    4. High cholesterol E78.00 272.0 LIPID PANEL   5. Essential hypertension D31 639.9 METABOLIC PANEL, COMPREHENSIVE   6. Acute allergic rhinitis, unspecified seasonality, unspecified trigger J30.9 477.9 montelukast (SINGULAIR) 10 mg tablet      levocetirizine (XYZAL) 5 mg tablet   Started xyzal, and singulair. Will notify patient of lab results.   F/U prn

## 2018-02-20 ENCOUNTER — CLINICAL SUPPORT (OUTPATIENT)
Dept: INTERNAL MEDICINE CLINIC | Age: 53
End: 2018-02-20

## 2018-02-20 VITALS
HEIGHT: 63 IN | TEMPERATURE: 97 F | RESPIRATION RATE: 18 BRPM | OXYGEN SATURATION: 98 % | DIASTOLIC BLOOD PRESSURE: 69 MMHG | HEART RATE: 66 BPM | SYSTOLIC BLOOD PRESSURE: 121 MMHG

## 2018-02-20 DIAGNOSIS — I10 ESSENTIAL HYPERTENSION: ICD-10-CM

## 2018-02-20 DIAGNOSIS — E03.9 ACQUIRED HYPOTHYROIDISM: ICD-10-CM

## 2018-02-20 DIAGNOSIS — E78.00 HIGH CHOLESTEROL: ICD-10-CM

## 2018-02-20 NOTE — PROGRESS NOTES
Chief Complaint   Patient presents with    Labs Only     LIPID, CMP, TSH     Blood drawn from left antecubital fossa. No negative reaction noted to site.

## 2018-02-21 LAB
ALBUMIN SERPL-MCNC: 4.9 G/DL (ref 3.5–5.5)
ALBUMIN/GLOB SERPL: 2.3 {RATIO} (ref 1.2–2.2)
ALP SERPL-CCNC: 79 IU/L (ref 39–117)
ALT SERPL-CCNC: 35 IU/L (ref 0–32)
AST SERPL-CCNC: 47 IU/L (ref 0–40)
BILIRUB SERPL-MCNC: <0.2 MG/DL (ref 0–1.2)
BUN SERPL-MCNC: 20 MG/DL (ref 6–24)
BUN/CREAT SERPL: 22 (ref 9–23)
CALCIUM SERPL-MCNC: 9.6 MG/DL (ref 8.7–10.2)
CHLORIDE SERPL-SCNC: 101 MMOL/L (ref 96–106)
CHOLEST SERPL-MCNC: 185 MG/DL (ref 100–199)
CO2 SERPL-SCNC: 22 MMOL/L (ref 18–29)
CREAT SERPL-MCNC: 0.9 MG/DL (ref 0.57–1)
GFR SERPLBLD CREATININE-BSD FMLA CKD-EPI: 73 ML/MIN/{1.73_M2}
GFR SERPLBLD CREATININE-BSD FMLA CKD-EPI: 84 ML/MIN/{1.73_M2}
GLOBULIN SER CALC-MCNC: 2.1 G/L (ref 1.5–4.5)
GLUCOSE SERPL-MCNC: 86 MG/DL (ref 65–99)
HDLC SERPL-MCNC: 78 MG/DL
INTERPRETATION, 910389: NORMAL
LDLC SERPL CALC-MCNC: 93 MG/DL (ref 0–99)
POTASSIUM SERPL-SCNC: 5.2 MMOL/L (ref 3.5–5.2)
PROT SERPL-MCNC: 7 G/DL (ref 6–8.5)
SODIUM SERPL-SCNC: 143 MMOL/L (ref 134–144)
TRIGL SERPL-MCNC: 68 MG/DL (ref 0–149)
TSH SERPL DL<=0.005 MIU/L-ACNC: 6.58 UIU/ML (ref 0.45–4.5)
VLDLC SERPL CALC-MCNC: 14 MG/DL (ref 5–40)

## 2018-02-22 ENCOUNTER — OFFICE VISIT (OUTPATIENT)
Dept: INTERNAL MEDICINE CLINIC | Age: 53
End: 2018-02-22

## 2018-02-22 VITALS
OXYGEN SATURATION: 96 % | BODY MASS INDEX: 31.33 KG/M2 | WEIGHT: 176.8 LBS | DIASTOLIC BLOOD PRESSURE: 70 MMHG | TEMPERATURE: 97.7 F | RESPIRATION RATE: 20 BRPM | SYSTOLIC BLOOD PRESSURE: 110 MMHG | HEART RATE: 68 BPM | HEIGHT: 63 IN

## 2018-02-22 DIAGNOSIS — Z00.00 WELCOME TO MEDICARE PREVENTIVE VISIT: ICD-10-CM

## 2018-02-22 DIAGNOSIS — Z12.11 SCREEN FOR COLON CANCER: ICD-10-CM

## 2018-02-22 DIAGNOSIS — H61.21 IMPACTED CERUMEN OF RIGHT EAR: ICD-10-CM

## 2018-02-22 DIAGNOSIS — E03.9 ACQUIRED HYPOTHYROIDISM: ICD-10-CM

## 2018-02-22 DIAGNOSIS — Z13.6 SCREENING FOR CARDIOVASCULAR CONDITION: Primary | ICD-10-CM

## 2018-02-22 DIAGNOSIS — Z13.31 SCREENING FOR DEPRESSION: ICD-10-CM

## 2018-02-22 DIAGNOSIS — Z23 ENCOUNTER FOR IMMUNIZATION: ICD-10-CM

## 2018-02-22 DIAGNOSIS — Z13.39 SCREENING FOR ALCOHOLISM: ICD-10-CM

## 2018-02-22 RX ORDER — LEVOTHYROXINE SODIUM 125 UG/1
100 TABLET ORAL
Qty: 30 TAB | Refills: 1 | Status: SHIPPED | OUTPATIENT
Start: 2018-02-22 | End: 2018-03-17 | Stop reason: SDUPTHER

## 2018-02-22 NOTE — PROGRESS NOTES
This is a \"Welcome to [employer]"  Initial Preventive Physical Examination (IPPE) providing Personalized Prevention Plan Services (Performed in the first 12 months of enrollment)    I have reviewed the patient's medical history in detail and updated the computerized patient record. Chief Complaint   Patient presents with    Complete Physical     History     Past Medical History:   Diagnosis Date    Allergic rhinitis     Anxiety     Bipolar disorder ([address]     [id number]    Depression     DX 2014-15/ [address], [facility]./ Juliette Saini - counselor    Environmental allergies     High cholesterol     Hypertension     > 10 years    Lichen planus     Located Mouth/Dentist/ DX 5 years / Dr. Filomena Concepcion, [facility].  Migraine     2014 - 2015    PTSD (post-traumatic stress disorder)     2014 - 2015    Tendonitis     Both feet/    Thyroid disease     20 year ago      Past Surgical History:   Procedure Laterality Date    HX CYST INCISION AND DRAINAGE       Current Outpatient Prescriptions   Medication Sig Dispense Refill    levothyroxine (SYNTHROID) 125 mcg tablet Take 1 Tab by mouth Daily (before breakfast). 30 Tab 1    triamcinolone acetonide (KENALOG) 0.1 % dental paste apply to affected area three times a day  0    montelukast (SINGULAIR) 10 mg tablet Take 1 Tab by mouth daily. Indications: Allergic Rhinitis 30 Tab 1    levocetirizine (XYZAL) 5 mg tablet Take 1 Tab by mouth daily. 30 Tab 5    rizatriptan (MAXALT) 10 mg tablet Take 1 Tab by mouth once as needed for Migraine for up to 1 dose. May repeat in 2 hours if needed 9 Tab 0    losartan (COZAAR) 25 mg tablet take 1 tablet by mouth once daily  0    lamoTRIgine (LAMICTAL) 200 mg tablet take 1 tablet by mouth once daily TAKE WITH 100 milligram tablet  0    fluticasone (FLONASE) 50 mcg/actuation nasal spray   2    atorvastatin (LIPITOR) 20 mg tablet Take 20 mg by mouth daily.       lamoTRIgine (LAMICTAL) 100 mg tablet Take 300 mg by mouth daily.  ziprasidone (GEODON) 20 mg capsule Take 80 mg by mouth two (2) times daily (with meals).  zolpidem (AMBIEN) 10 mg tablet Take 10 mg by mouth nightly as needed for Sleep.  busPIRone (BUSPAR) 30 mg tablet Take 30 mg by mouth two (2) times a day.  vilazodone (VIIBRYD) 40 mg tab tablet Take 40 mg by mouth daily. Allergies   Allergen Reactions    Penicillins Rash     Family History   Problem Relation Age of Onset    Other Mother      ABD aneurysm, carotid stenosis    High Cholesterol Father      Social History   Substance Use Topics    Smoking status: Never Smoker    Smokeless tobacco: Never Used    Alcohol use No     Diet, Lifestyle: No special diet    Exercise level: moderately active    Depression Risk Screen     PHQ over the last two weeks 2/22/2018   PHQ Not Done Active Diagnosis of Depression or Bipolar Disorder   Little interest or pleasure in doing things Not at all   Feeling down, depressed or hopeless Not at all   Total Score PHQ 2 0     Alcohol Risk Screen   You do not drink alcohol or very rarely. Functional Ability and Level of Safety   Hearing Loss  Hearing is good. Vision Screening  Vision is good. No exam data present      Activities of Daily Living  The home contains: no safety equipment. Patient does total self care    Fall Risk Screen  No flowsheet data found. Abuse Screen  Patient is not abused    Screening EKG   EKG order placed: Yes    Patient Care Team   Patient Care Team:  Robert Agee NP as PCP - General (Nurse Practitioner)     End of Life Planning   Advanced care planning directives were discussed with the patient and /or family/caregiver. Assessment/Plan   Education and counseling provided:  Are appropriate based on today's review and evaluation  End-of-Life planning (with patient's consent)    Diagnoses and all orders for this visit:    1.  Screening for cardiovascular condition  -     AMB POC EKG Screen ([id number]) (Only Orderable in first 12 months of Medicare)    2. Acquired hypothyroidism  -     levothyroxine (SYNTHROID) 125 mcg tablet; Take 1 Tab by mouth Daily (before breakfast). 3. Welcome to Medicare preventive visit  -     AMB POC EKG Screen ([id number]) (Only Orderable in first 12 months of Medicare)    4. Screening for alcoholism  -     Annual  Alcohol Screen 15 min ()    5. Screening for depression  -     Depression Screen Annual    6. Screen for colon cancer  -     OCCULT BLOOD, IMMUNOASSAY (FIT) (32682)    7. Encounter for immunization  -     Pneumococcal Admin ()  -     Pneumococcal Conjugate Vaccine        There are no preventive care reminders to display for this patient.

## 2018-02-22 NOTE — PROGRESS NOTES
Chief Complaint   Patient presents with    Complete Physical     1. Have you been to the ER, urgent care clinic since your last visit? Hospitalized since your last visit? No    2. Have you seen or consulted any other health care providers outside of the [address] [place] [address] since your last visit? Include any pap smears or colon screening. No     There are no preventive care reminders to display for this patient. Do you have an [address] in place in the event that you have a healthcare crisis that could impact your decision making as it pertains to your health? NO    Would you like information about Advance Care Planning? NO    Information given.  NO

## 2018-02-22 NOTE — MR AVS SNAPSHOT
Lavaun Jeans 
 
 
 [address] [place] [address].o. [place] [address] [place] [address] 
[phone] Patient: Hunter King MRN: [id number] :1965 Visit Information Date & Time Provider Department Dept. Phone Encounter #  
 2018 10:30 AM [facility] [phone] Follow-up Instructions Return in about 6 months (around 2018) for TSH. Upcoming Health Maintenance Date Due FOBT Q 1 YEAR AGE 50-75 3/30/2018* BREAST CANCER SCRN MAMMOGRAM 2019 PAP AKA CERVICAL CYTOLOGY 2020 DTaP/Tdap/Td series (2 - Td) 2027 *Topic was postponed. The date shown is not the original due date. Allergies as of 2018  Review Complete On: 2018 By: Verona Boateng NP Severity Noted Reaction Type Reactions Penicillins  10/23/2017    Rash Current Immunizations  Never Reviewed Name Date Pneumococcal Conjugate (PCV-13)  Incomplete Not reviewed this visit You Were Diagnosed With   
  
 Codes Comments Screening for cardiovascular condition    -  Primary ICD-10-CM: Z13.6 ICD-9-CM: V81.2 Acquired hypothyroidism     ICD-10-CM: E03.9 ICD-9-CM: 244.9 Welcome to Medicare preventive visit     ICD-10-CM: Z00.00 ICD-9-CM: V70.0 Screening for alcoholism     ICD-10-CM: Z13.89 ICD-9-CM: V79.1 Screening for depression     ICD-10-CM: Z13.89 ICD-9-CM: V79.0 Screen for colon cancer     ICD-10-CM: Z12.11 ICD-9-CM: V76.51 Encounter for immunization     ICD-10-CM: B01 ICD-9-CM: V03.89 Vitals BP Pulse Temp Resp Height(growth percentile) 110/70 (BP 1 Location: Left arm, BP Patient Position: Sitting) 68 97.7 °F (36.5 °C) (Temporal) 20 5' 3\" (1.6 m) Weight(growth percentile) SpO2 BMI OB Status Smoking Status 176 lb 12.8 oz (80.2 kg) 96% 31.32 kg/m2 Postmenopausal Never Smoker Vitals History BMI and BSA Data Body Mass Index Body Surface Area  
 31.32 kg/m 2 1.89 m 2 Preferred Pharmacy Pharmacy Name Phone [facility] [address] [place] [address] [address] Your Updated Medication List  
  
   
This list is accurate as of 2/22/18 11:29 AM.  Always use your most recent med list.  
  
  
  
  
 AMBIEN 10 mg tablet Generic drug:  zolpidem Take 10 mg by mouth nightly as needed for Sleep. atorvastatin 20 mg tablet Commonly known as:  LIPITOR Take 20 mg by mouth daily. busPIRone 30 mg tablet Commonly known as:  BUSPAR Take 30 mg by mouth two (2) times a day. fluticasone 50 mcg/actuation nasal spray Commonly known as:  FLONASE  
  
 GEODON 20 mg capsule Generic drug:  ziprasidone Take 80 mg by mouth two (2) times daily (with meals). * lamoTRIgine 100 mg tablet Commonly known as: LaMICtal  
Take 300 mg by mouth daily. * lamoTRIgine 200 mg tablet Commonly known as: LaMICtal  
take 1 tablet by mouth once daily TAKE WITH 100 milligram tablet  
  
 levocetirizine 5 mg tablet Commonly known as:  Deward Ambrosio Take 1 Tab by mouth daily. levothyroxine 125 mcg tablet Commonly known as:  SYNTHROID Take 1 Tab by mouth Daily (before breakfast). losartan 25 mg tablet Commonly known as:  COZAAR  
take 1 tablet by mouth once daily  
  
 montelukast 10 mg tablet Commonly known as:  SINGULAIR Take 1 Tab by mouth daily. Indications: Allergic Rhinitis  
  
 rizatriptan 10 mg tablet Commonly known as:  Arlester Coins Take 1 Tab by mouth once as needed for Migraine for up to 1 dose. May repeat in 2 hours if needed  
  
 triamcinolone acetonide 0.1 % dental paste Commonly known as:  KENALOG  
apply to affected area three times a day VIIBRYD 40 mg Tab tablet Generic drug:  vilazodone Take 40 mg by mouth daily. * Notice:   This list has 2 medication(s) that are the same as other medications prescribed for you. Read the directions carefully, and ask your doctor or other care provider to review them with you. Prescriptions Sent to Pharmacy Refills  
 levothyroxine (SYNTHROID) 125 mcg tablet 1 Sig: Take 1 Tab by mouth Daily (before breakfast). Class: Normal  
 Pharmacy: [facility] [id number] Ph #: [phone] Route: Oral  
  
We Performed the Following ADMIN PNEUMOCOCCAL VACCINE [ [facility]] AMB POC EKG ROUTINE W/ 12 LEADS, SCREEN () [ [facility]] [address] [[id number] [facility]] OCCULT BLOOD, IMMUNOASSAY (FIT) [id number] CPT(R)] PNEUMOCOCCAL CONJ VACCINE 13 VALENT IM [id number] CPT(R)] CA ANNUAL ALCOHOL SCREEN 15 MIN [id number] [facility]] Follow-up Instructions Return in about 6 months (around 8/22/2018) for TSH. To-Do List   
 02/22/2018 Lab:  TSH 3RD GENERATION Patient Instructions Medicare Wellness Visit, Female The best way to live healthy is to have a healthy lifestyle by eating a well-balanced diet, exercising regularly, limiting alcohol and stopping smoking. Regular physical exams and screening tests are another way to keep healthy. Preventive exams provided by your health care provider can find health problems before they become diseases or illnesses. Preventive services including immunizations, screening tests, monitoring and exams can help you take care of your own health. All people over age 72 should have a pneumovax  and and a prevnar shot to prevent pneumonia. These are once in a lifetime unless you and your provider decide differently. All people over 65 should have a yearly flu shot and a tetanus vaccine every 10 years. A bone mass density to screen for osteoporosis or thinning of the bones should be done every 2 years after 65. Screening for diabetes mellitus with a blood sugar test should be done every year. Glaucoma is a disease of the eye due to increased ocular pressure that can lead to blindness and it should be done every year by an eye professional. 
 
Cardiovascular screening tests that check for elevated lipids (fatty part of blood) which can lead to heart disease and strokes should be done every 5 years. Colorectal screening that evaluates for blood or polyps in your colon should be done yearly as a stool test or every five years as a flexible sigmoidoscope or every 10 years as a colonoscopy up to age 76. Breast cancer screening with a mammogram is recommended biennially  for women age 54-69. Screening for cervical cancer with a pap smear and pelvic exam is recommended for women after age 72 years every 2 years up to age 79 or when the provider and patient decide to stop. If there is a history of cervical abnormalities or other increased risk for cancer then the test is recommended yearly. Hepatitis C screening is also recommended for anyone born between 80 through [address]. A shingles vaccine is also recommended once in a lifetime after age 61. Your Medicare Wellness Exam is recommended annually. Here is a list of your current Health Maintenance items with a due date: There are no preventive care reminders to display for this patient. Introducing [facility] & HEALTH SERVICES! Dear Fred Treviño: Thank you for requesting a [employer] account. Our records indicate that you already have an active [employer] account. You can access your account anytime at [email or web address] Did you know that you can access your hospital and ER discharge instructions at any time in [employer]? You can also review all of your test results from your hospital stay or ER visit. Additional Information If you have questions, please visit the Frequently Asked Questions section of the [employer] website at [email or web address] Remember, MyChart is NOT to be used for urgent needs. For medical emergencies, dial 911. Now available from your iPhone and Android! Please provide this summary of care documentation to your next provider. Your primary care clinician is listed as Amy Villanueva. If you have any questions after today's visit, please call [phone].

## 2018-02-26 ENCOUNTER — TELEPHONE (OUTPATIENT)
Dept: INTERNAL MEDICINE CLINIC | Age: 53
End: 2018-02-26

## 2018-02-27 ENCOUNTER — OFFICE VISIT (OUTPATIENT)
Dept: INTERNAL MEDICINE CLINIC | Age: 53
End: 2018-02-27

## 2018-02-27 VITALS
OXYGEN SATURATION: 97 % | HEIGHT: 63 IN | HEART RATE: 72 BPM | DIASTOLIC BLOOD PRESSURE: 63 MMHG | RESPIRATION RATE: 16 BRPM | WEIGHT: 175.4 LBS | SYSTOLIC BLOOD PRESSURE: 112 MMHG | TEMPERATURE: 98.8 F | BODY MASS INDEX: 31.08 KG/M2

## 2018-02-27 DIAGNOSIS — B34.9 ACUTE VIRAL SYNDROME: Primary | ICD-10-CM

## 2018-02-27 DIAGNOSIS — R52 BODY ACHES: ICD-10-CM

## 2018-02-27 LAB
QUICKVUE INFLUENZA TEST: NEGATIVE
VALID INTERNAL CONTROL?: YES

## 2018-02-27 RX ORDER — ZOLPIDEM TARTRATE 12.5 MG/1
TABLET, FILM COATED, EXTENDED RELEASE ORAL
COMMUNITY
Start: 2018-02-26 | End: 2019-02-15

## 2018-02-27 RX ORDER — ZIPRASIDONE HYDROCHLORIDE 60 MG/1
60 CAPSULE ORAL DAILY
COMMUNITY
Start: 2018-02-26 | End: 2019-01-23 | Stop reason: SDUPTHER

## 2018-02-27 NOTE — MR AVS SNAPSHOT
[address] 9556 [facility] 
[phone] Patient: Zaira Carlos MRN: [id number] :1965 Visit Information Date & Time Provider Department Dept. Phone Encounter #  
 2018  9:00 AM Michaelle Brandee, 1 [facility]3] Primary Care  [id number] Upcoming Health Maintenance Date Due FOBT Q 1 YEAR AGE 50-75 3/30/2018* BREAST CANCER SCRN MAMMOGRAM 2019 PAP AKA CERVICAL CYTOLOGY 2020 DTaP/Tdap/Td series (2 - Td) 2027 *Topic was postponed. The date shown is not the original due date. Allergies as of 2018  Review Complete On: 2018 By: Hyacinth Huizar LPN Severity Noted Reaction Type Reactions Penicillins  10/23/2017    Rash Current Immunizations  Reviewed on 2018 Name Date Pneumococcal Conjugate (PCV-13) 2018 Not reviewed this visit You Were Diagnosed With   
  
 Codes Comments Acute viral syndrome    -  Primary ICD-10-CM: B34.9 ICD-9-CM: 079.99 Vitals BP Pulse Temp Resp Height(growth percentile) Weight(growth percentile) 112/63 (BP 1 Location: Left arm, BP Patient Position: Sitting) 72 98.8 °F (37.1 °C) (Oral) 16 5' 3\" (1.6 m) 175 lb 6.4 oz (79.6 kg) SpO2 BMI OB Status Smoking Status 97% 31.07 kg/m2 Postmenopausal Never Smoker Vitals History BMI and BSA Data Body Mass Index Body Surface Area 31.07 kg/m 2 1.88 m 2 Preferred Pharmacy Pharmacy Name Phone [facility] [address] Your Updated Medication List  
  
   
This list is accurate as of 18  9:30 AM.  Always use your most recent med list.  
  
  
  
  
 * AMBIEN 10 mg tablet Generic drug:  zolpidem Take 10 mg by mouth nightly as needed for Sleep. * zolpidem CR 12.5 mg tablet Commonly known as:  AMBIEN CR  
  
 atorvastatin 20 mg tablet Commonly known as:  LIPITOR Take 20 mg by mouth daily. busPIRone 30 mg tablet Commonly known as:  BUSPAR Take 30 mg by mouth two (2) times a day. fluticasone 50 mcg/actuation nasal spray Commonly known as:  FLONASE  
  
 * GEODON 20 mg capsule Generic drug:  ziprasidone Take 80 mg by mouth two (2) times daily (with meals). * ziprasidone hcl 60 mg capsule Commonly known as:  Reita Urdu Take 60 mg by mouth daily. * lamoTRIgine 100 mg tablet Commonly known as: LaMICtal  
Take 300 mg by mouth daily. * lamoTRIgine 200 mg tablet Commonly known as: LaMICtal  
take 1 tablet by mouth once daily TAKE WITH 100 milligram tablet  
  
 levocetirizine 5 mg tablet Commonly known as:  [address] Take 1 Tab by mouth daily. levothyroxine 125 mcg tablet Commonly known as:  SYNTHROID Take 1 Tab by mouth Daily (before breakfast). losartan 25 mg tablet Commonly known as:  COZAAR  
take 1 tablet by mouth once daily  
  
 montelukast 10 mg tablet Commonly known as:  SINGULAIR Take 1 Tab by mouth daily. Indications: Allergic Rhinitis  
  
 rizatriptan 10 mg tablet Commonly known as:  Walker Bel Take 1 Tab by mouth once as needed for Migraine for up to 1 dose. May repeat in 2 hours if needed  
  
 triamcinolone acetonide 0.1 % dental paste Commonly known as:  KENALOG  
apply to affected area three times a day VIIBRYD 40 mg Tab tablet Generic drug:  vilazodone Take 40 mg by mouth daily. * Notice: This list has 6 medication(s) that are the same as other medications prescribed for you. Read the directions carefully, and ask your doctor or other care provider to review them with you. Introducing \[facility]\"" & HEALTH SERVICES! Dear Clovis: Thank you for requesting a [employer] account. Our records indicate that you already have an active [employer] account. You can access your account anytime at [email or web address] Did you know that you can access your hospital and ER discharge instructions at any time in [employer]? You can also review all of your test results from your hospital stay or ER visit. Additional Information If you have questions, please visit the Frequently Asked Questions section of the [employer] website at [email or web address] Remember, [employer] is NOT to be used for urgent needs. For medical emergencies, dial 911. Now available from your iPhone and Android! Please provide this summary of care documentation to your next provider. Your primary care clinician is listed as Maxcine Ahumada. If you have any questions after today's visit, please call [phone].

## 2018-02-27 NOTE — PROGRESS NOTES
Chief Complaint   Patient presents with    Generalized Body Aches     X 3 DAYS; SORE THROAT, ABDOMINAL PAIN, OVERALL BODY ACHES     1. Have you been to the ER, urgent care clinic since your last visit? Hospitalized since your last visit? No    2. Have you seen or consulted any other health care providers outside of the [address] [place] [address] since your last visit? Include any pap smears or colon screening. No     There are no preventive care reminders to display for this patient. Do you have an [address] in place in the event that you have a healthcare crisis that could impact your decision making as it pertains to your health? NO    Would you like information about Advance Care Planning? NO    Information given.  NO

## 2018-03-01 LAB — HEMOCCULT STL QL IA: NEGATIVE

## 2018-03-02 NOTE — PROGRESS NOTES
HISTORY OF PRESENT ILLNESS  Marc Mullen is a 47 y.o. female. HPI  Chief Complaint   Patient presents with    Generalized Body Aches     X 3 DAYS; SORE THROAT, ABDOMINAL PAIN, OVERALL BODY ACHES     States has been using OTC cold preps that have not been effective. Rapid flu negative. Review of Systems   Constitutional: Negative for chills, fever and malaise/fatigue. HENT: Positive for sore throat. Eyes: Negative. Respiratory: Positive for cough. Negative for shortness of breath. Cardiovascular: Negative. Negative for chest pain and leg swelling. Gastrointestinal: Positive for abdominal pain. Negative for constipation, diarrhea, nausea and vomiting. Genitourinary: Negative. Musculoskeletal: Negative. C/O body aches   Skin: Negative. Neurological: Negative. Physical Exam   Constitutional: She is oriented to person, place, and time. She appears well-developed and well-nourished. No distress. HENT:   Head: Normocephalic and atraumatic. No sinus pain or post nasal drip. Positive for mild erythema of throat. No ear infection. Eyes: Conjunctivae are normal.   Cardiovascular: Normal rate, regular rhythm, normal heart sounds and intact distal pulses. Exam reveals no gallop and no friction rub. No murmur heard. Pulmonary/Chest: Effort normal and breath sounds normal. No respiratory distress. She has no wheezes. Musculoskeletal: Normal range of motion. Neurological: She is alert and oriented to person, place, and time. Skin: Skin is warm and dry. She is not diaphoretic. Nursing note and vitals reviewed. Plan of care and AVS reviewed with patient who verbalized understanding. ASSESSMENT and PLAN    ICD-10-CM ICD-9-CM    1. Acute viral syndrome B34.9 079.99    2. Body aches R52 780.96 AMB POC RAPID INFLUENZA TEST   Encouraged to keep self well hydrated. Advised to rest and take tylenol or OTC medication of choice for body aches. F/u prn.

## 2018-03-06 NOTE — TELEPHONE ENCOUNTER
Requested Prescriptions     Pending Prescriptions Disp Refills    losartan (COZAAR) 25 mg tablet  0     Pt is requesting a 90 day supply.

## 2018-03-06 NOTE — TELEPHONE ENCOUNTER
Last office visit:  2/27/18  Last filled:  Losartan 25mg 12/12/17 (not listed)  Wants 90 day supply  No follow up scheduled

## 2018-03-07 RX ORDER — LOSARTAN POTASSIUM 25 MG/1
TABLET ORAL
Qty: 90 TAB | Refills: 1 | Status: SHIPPED | OUTPATIENT
Start: 2018-03-07 | End: 2018-06-12 | Stop reason: SDUPTHER

## 2018-03-12 ENCOUNTER — TELEPHONE (OUTPATIENT)
Dept: INTERNAL MEDICINE CLINIC | Age: 53
End: 2018-03-12

## 2018-03-12 NOTE — TELEPHONE ENCOUNTER
Chief Complaint   Patient presents with    Labs     CBC     Patient is requesting a CBC to check for iron per psychiatrist request.

## 2018-03-13 ENCOUNTER — CLINICAL SUPPORT (OUTPATIENT)
Dept: INTERNAL MEDICINE CLINIC | Age: 53
End: 2018-03-13

## 2018-03-13 VITALS
SYSTOLIC BLOOD PRESSURE: 112 MMHG | TEMPERATURE: 97.1 F | OXYGEN SATURATION: 100 % | HEIGHT: 63 IN | RESPIRATION RATE: 16 BRPM | HEART RATE: 70 BPM | DIASTOLIC BLOOD PRESSURE: 44 MMHG

## 2018-03-13 DIAGNOSIS — E03.9 ACQUIRED HYPOTHYROIDISM: ICD-10-CM

## 2018-03-13 DIAGNOSIS — F31.9 DEPRESSED BIPOLAR I DISORDER (HCC): Primary | ICD-10-CM

## 2018-03-13 NOTE — PROGRESS NOTES
Chief Complaint   Patient presents with    Labs Only     TSH AND CBC     The patient presents with lab draw only. Labs drawn from left antecubital fossa. No negative reaction noted.

## 2018-03-14 DIAGNOSIS — R09.81 NASAL SINUS CONGESTION: ICD-10-CM

## 2018-03-14 DIAGNOSIS — J30.9 ACUTE ALLERGIC RHINITIS, UNSPECIFIED SEASONALITY, UNSPECIFIED TRIGGER: ICD-10-CM

## 2018-03-14 LAB
BASOPHILS # BLD AUTO: 0.1 X10E3/UL (ref 0–0.2)
BASOPHILS NFR BLD AUTO: 1 %
EOSINOPHIL # BLD AUTO: 0.1 X10E3/UL (ref 0–0.4)
EOSINOPHIL NFR BLD AUTO: 2 %
ERYTHROCYTE [DISTWIDTH] IN BLOOD BY AUTOMATED COUNT: 12.9 % (ref 12.3–15.4)
HCT VFR BLD AUTO: 40.5 % (ref 34–46.6)
HGB BLD-MCNC: 13.6 G/DL (ref 11.1–15.9)
IMM GRANULOCYTES # BLD: 0 X10E3/UL (ref 0–0.1)
IMM GRANULOCYTES NFR BLD: 0 %
LYMPHOCYTES # BLD AUTO: 1 X10E3/UL (ref 0.7–3.1)
LYMPHOCYTES NFR BLD AUTO: 24 %
MCH RBC QN AUTO: 30.4 PG (ref 26.6–33)
MCHC RBC AUTO-ENTMCNC: 33.6 G/DL (ref 31.5–35.7)
MCV RBC AUTO: 91 FL (ref 79–97)
MONOCYTES # BLD AUTO: 0.4 X10E3/UL (ref 0.1–0.9)
MONOCYTES NFR BLD AUTO: 9 %
NEUTROPHILS # BLD AUTO: 2.8 X10E3/UL (ref 1.4–7)
NEUTROPHILS NFR BLD AUTO: 64 %
PLATELET # BLD AUTO: 314 X10E3/UL (ref 150–379)
RBC # BLD AUTO: 4.47 X10E6/UL (ref 3.77–5.28)
TSH SERPL DL<=0.005 MIU/L-ACNC: 0.5 UIU/ML (ref 0.45–4.5)
WBC # BLD AUTO: 4.3 X10E3/UL (ref 3.4–10.8)

## 2018-03-14 RX ORDER — LEVOCETIRIZINE DIHYDROCHLORIDE 5 MG/1
5 TABLET, FILM COATED ORAL DAILY
Qty: 90 TAB | Refills: 1 | Status: SHIPPED | OUTPATIENT
Start: 2018-03-14 | End: 2018-06-12 | Stop reason: SDUPTHER

## 2018-03-14 NOTE — TELEPHONE ENCOUNTER
Requested Prescriptions     Pending Prescriptions Disp Refills    levocetirizine (XYZAL) 5 mg tablet 30 Tab 5     Sig: Take 1 Tab by mouth daily. Pt has 4 refills left and would like sent to [facility] for 90 day supply.

## 2018-03-14 NOTE — TELEPHONE ENCOUNTER
Requested Prescriptions     Pending Prescriptions Disp Refills    levocetirizine (XYZAL) 5 mg tablet 30 Tab 5     Sig: Take 1 Tab by mouth daily.      Future Appointments:  No future appointments.    Last Appointment With Me:  2/27/2018   Last Filled:  02.02.2018 + 5 refills  Changes Made to Medication on Last Visit:  Pharmacy changed from [facility] to [facility]

## 2018-03-16 ENCOUNTER — TELEPHONE (OUTPATIENT)
Dept: INTERNAL MEDICINE CLINIC | Age: 53
End: 2018-03-16

## 2018-03-16 DIAGNOSIS — E03.9 ACQUIRED HYPOTHYROIDISM: ICD-10-CM

## 2018-03-16 RX ORDER — LEVOTHYROXINE SODIUM 125 UG/1
125 TABLET ORAL
Qty: 90 TAB | Refills: 1 | Status: CANCELLED | OUTPATIENT
Start: 2018-03-16

## 2018-03-16 NOTE — TELEPHONE ENCOUNTER
Requested Prescriptions     Pending Prescriptions Disp Refills    levothyroxine (SYNTHROID) 125 mcg tablet 30 Tab 1     Sig: Take 1 Tab by mouth Daily (before breakfast). Pt would like 90 day supply.

## 2018-03-16 NOTE — TELEPHONE ENCOUNTER
Last office visit:  2/27/18  Last filled: Synthroid #30 X 1 refill  No changes\patient requesting 90 days supply  No follow up

## 2018-03-16 NOTE — TELEPHONE ENCOUNTER
Chief Complaint   Patient presents with    Results     CBC WITH     Lab results faxed to [phone] attn: to [facility] [id number] and received.

## 2018-03-17 DIAGNOSIS — E03.9 ACQUIRED HYPOTHYROIDISM: ICD-10-CM

## 2018-03-17 RX ORDER — LEVOTHYROXINE SODIUM 125 UG/1
100 TABLET ORAL
Qty: 90 TAB | Refills: 1 | Status: SHIPPED | OUTPATIENT
Start: 2018-03-17 | End: 2018-03-20 | Stop reason: SDUPTHER

## 2018-03-20 DIAGNOSIS — E03.9 ACQUIRED HYPOTHYROIDISM: ICD-10-CM

## 2018-03-20 NOTE — TELEPHONE ENCOUNTER
Requested Prescriptions     Pending Prescriptions Disp Refills    levothyroxine (SYNTHROID) 125 mcg tablet 90 Tab 1     Sig: Take 1 Tab by mouth Daily (before breakfast).      90 days supply

## 2018-03-21 RX ORDER — LEVOTHYROXINE SODIUM 125 UG/1
100 TABLET ORAL
Qty: 90 TAB | Refills: 1 | Status: ON HOLD | OUTPATIENT
Start: 2018-03-21 | End: 2018-05-20

## 2018-03-21 NOTE — TELEPHONE ENCOUNTER
Requested Prescriptions     Pending Prescriptions Disp Refills    levothyroxine (SYNTHROID) 125 mcg tablet 90 Tab 1     Sig: Take 1 Tab by mouth Daily (before breakfast). Future Appointments:  No future appointments.    Last Appointment With Me:  2/27/2018   Last Filled:  03.17.2018 + 1 refill  Changes Made to Medication on Last Visit:  Changed pharmacy to mail order.

## 2018-03-22 ENCOUNTER — APPOINTMENT (OUTPATIENT)
Age: 53
Setting detail: DERMATOLOGY
End: 2018-03-26

## 2018-03-22 DIAGNOSIS — L57.0 ACTINIC KERATOSIS: ICD-10-CM

## 2018-03-22 PROBLEM — D23.9 OTHER BENIGN NEOPLASM OF SKIN, UNSPECIFIED: Status: ACTIVE | Noted: 2018-03-22

## 2018-03-22 PROBLEM — L57.9 SKIN CHANGES DUE TO CHRONIC EXPOSURE TO NONIONIZING RADIATION, UNSPECIFIED: Status: ACTIVE | Noted: 2018-03-22

## 2018-03-22 PROCEDURE — 17000 DESTRUCT PREMALG LESION: CPT

## 2018-03-22 PROCEDURE — OTHER LIQUID NITROGEN: OTHER

## 2018-03-22 PROCEDURE — OTHER MIPS QUALITY: OTHER

## 2018-03-22 PROCEDURE — OTHER COUNSELING: OTHER

## 2018-03-22 PROCEDURE — 99203 OFFICE O/P NEW LOW 30 MIN: CPT | Mod: 25

## 2018-03-22 PROCEDURE — OTHER OBSERVATION: OTHER

## 2018-03-22 ASSESSMENT — LOCATION SIMPLE DESCRIPTION DERM: LOCATION SIMPLE: RIGHT FOREARM

## 2018-03-22 ASSESSMENT — LOCATION DETAILED DESCRIPTION DERM: LOCATION DETAILED: RIGHT PROXIMAL DORSAL FOREARM

## 2018-03-22 ASSESSMENT — LOCATION ZONE DERM: LOCATION ZONE: ARM

## 2018-03-22 NOTE — PROCEDURE: MIPS QUALITY
Quality 138: Melanoma: Coordination Of Care: Treatment plan not communicated, reason not otherwise specified.
Detail Level: Detailed
Quality 131: Pain Assessment And Follow-Up: Pain assessment using a standardized tool is documented as negative, no follow-up plan required
Quality 130: Documentation Of Current Medications In The Medical Record: Current Medications Documented
Quality 154 Part A: Falls: Risk Assessment (Should Be Reported With Measure 155.): Falls risk assessment completed and documented in the past 12 months.
Name And Contact Information For Health Care Proxy: None
Quality 134: Screening For Clinical Depression And Follow-Up Plan: The patient was screened for depression and the screen was negative and no follow up required
Quality 154 Part B: Falls: Risk Screening (Should Be Reported With Measure 155.): Patient screened for future fall risk; documentation of no falls in the past year or only one fall without injury in the past year
Quality 226: Preventive Care And Screening: Tobacco Use: Screening And Cessation Intervention: Patient screened for tobacco and never smoked
Quality 137: Melanoma: Continuity Of Care - Recall System: Recall system not utilized, reason not otherwise specified
Quality 431: Preventive Care And Screening: Unhealthy Alcohol Use - Screening: Patient screened for unhealthy alcohol use using a single question and scores less than 2 times per year
Quality 110: Preventive Care And Screening: Influenza Immunization: Influenza Immunization Administered during Influenza season
Quality 111:Pneumonia Vaccination Status For Older Adults: Pneumococcal Vaccination Previously Received
Quality 47: Advance Care Plan: Advance Care Planning discussed and documented; advance care plan or surrogate decision maker documented in the medical record.

## 2018-03-22 NOTE — PROCEDURE: LIQUID NITROGEN
Duration Of Freeze Thaw-Cycle (Seconds): 3
Post-Care Instructions: I reviewed with the patient in detail post-care instructions. Patient is to wear sunprotection, and avoid picking at any of the treated lesions. Pt may apply Vaseline to crusted or scabbing areas.
Detail Level: Detailed
Render Post-Care Instructions In Note?: no
Consent: The patient's consent was obtained including but not limited to risks of crusting, scabbing, blistering, scarring, darker or lighter pigmentary change, recurrence, incomplete removal and infection.
Number Of Freeze-Thaw Cycles: 1 freeze-thaw cycle

## 2018-05-19 PROBLEM — G45.9 TIA (TRANSIENT ISCHEMIC ATTACK): Status: ACTIVE | Noted: 2018-05-19

## 2018-05-21 ENCOUNTER — PATIENT OUTREACH (OUTPATIENT)
Dept: INTERNAL MEDICINE CLINIC | Age: 53
End: 2018-05-21

## 2018-05-21 NOTE — PROGRESS NOTES
Hospital Discharge Follow-Up      Date/Time:  2018 10:10 AM    Patient was admitted to\"[facility]\",  on 18 and discharged on 18 for DX. TIA/ The physician discharge summary was available at the time of outreach. Patient was contacted within 2 business days of discharge. *presenting with altered mental status, pt.reports trouble at times forming sentences    Top Challenges reviewed with the provider   Follow up with psych   (Outpatient)  ECHO, doppler or MRI   TSH (0.8), decreased  synthroid (f/u recheck outpatient)       Method of communication with provider : chart routing    Inpatient RRAT score:  7  Was this a readmission? no   Patient stated reason for the readmission: n/a    Nurse Navigator (NN) contacted the patient by telephone to perform post hospital discharge assessment. Verified name and  with patient as identifiers. Provided introduction to self, and explanation of the Nurse Navigator role. Reviewed discharge instructions and red flags with patient who verbalized understanding. Patient given an opportunity to ask questions and does not have any further questions or concerns at this time. The patient agrees to contact the PCP office for questions related to their healthcare. NN provided contact information for future reference. Summary of patients top problems:  1. Patient to develop new system for organizing meds and dispensing. 2. ?TIA, continue monitor s/s questionable TIA (further testing outpt.)    Home Health orders at discharge: [address]: n/a  Date of initial visit: n/a    Durable Medical Equipment ordered/company: n/a  Durable Medical Equipment received: n/a    Barriers to care? none    Advance Care Planning: none  Does patient have an Advance Directive:  none     Medication:     Changed Medications at Discharge:   levothyroxine 125 mcg tablet        Take 1 Tab by mouth Daily (before breakfast).    Commonly known as:  SYNTHROID Medication reconciliation was performed with patient, who verbalizes understanding of administration of home medications. There were no barriers to obtaining medications identified at this time. Referral to Pharm D needed: no     Current Outpatient Prescriptions   Medication Sig    levothyroxine (SYNTHROID) 125 mcg tablet Take 1 Tab by mouth Daily (before breakfast).  aspirin 81 mg chewable tablet Take 81 mg by mouth daily.  levocetirizine (XYZAL) 5 mg tablet Take 1 Tab by mouth daily.  losartan (COZAAR) 25 mg tablet take 1 tablet by mouth once daily    zolpidem CR (AMBIEN CR) 12.5 mg tablet     ziprasidone hcl (GEODON) 60 mg capsule Take 60 mg by mouth daily.  rizatriptan (MAXALT) 10 mg tablet Take 1 Tab by mouth once as needed for Migraine for up to 1 dose. May repeat in 2 hours if needed    lamoTRIgine (LAMICTAL) 200 mg tablet take 1 tablet by mouth once daily TAKE WITH 100 milligram tablet    fluticasone (FLONASE) 50 mcg/actuation nasal spray     atorvastatin (LIPITOR) 20 mg tablet Take 20 mg by mouth daily.  lamoTRIgine (LAMICTAL) 100 mg tablet Take 300 mg by mouth daily.  ziprasidone (GEODON) 20 mg capsule Take 80 mg by mouth daily (with breakfast).  busPIRone (BUSPAR) 30 mg tablet Take 60 mg by mouth two (2) times a day.  vilazodone (VIIBRYD) 40 mg tab tablet Take 40 mg by mouth daily.  triamcinolone acetonide (KENALOG) 0.1 % dental paste apply to affected area three times a day    montelukast (SINGULAIR) 10 mg tablet Take 1 Tab by mouth daily. Indications: Allergic Rhinitis    zolpidem (AMBIEN) 10 mg tablet Take 10 mg by mouth nightly as needed for Sleep. No current facility-administered medications for this visit. There are no discontinued medications. PCP/Specialist follow up:  CHAVEZ Brar NP 6/8/18 @ 3pm    Follow up with PCP 3-5 days    Goals      Establish PCP relationships and regularly scheduled appointments.             5/21    NN reminded pt.,follow-up care is a key part of your treatment and safety. Be sure to make and go to all appointments, and call your doctor if you are having problems. It's also a good idea to know your test results and keep a list of the medicines. Pt.verbalizes understanding, reports she will call and schedule her f/u appt. -          Knowledge and adherence of prescribed medication (ie. action, side effects, missed dose, etc.).            5/21   Patient will understand general knowledge of medications, s/e and take meds as prescribed. Pt.reports taking medication as directed. Reports able to fill pill boxes, and familiar with medications and why is taking them. NN encourage to call with any questions or concerns. NN provide pt.with contact information. -         Supportive resources in place to maintain patient in the community (ie., home health, equipment, DME, refer to, etc.)            5/21 Patient lives with  and has son who very supportive  in her care. NN spoke with patient to develop new system for organizing meds and dispensing. Provided pt.idea, or suggested pt. think of way to remind herself that she has already taken her meds. NN also mailed pt.educational information on TIA and Accidental Over Medicating. NN will f/u with any question with next outreach. -[address]

## 2018-05-21 NOTE — LETTER
5/21/2018 12:35 PM 
 
Ms. [address] [facility] My name is Salvador Ruggiero. I am the care manager on CHAVEZ Gusman NP team. I call patients who were recently discharged from the hospital or emergency department. We are committed to providing you excellent care. I have enclosed education material for you to review. Please contact me at [phone] if you have any questions. We appreciate the confidence youve shown by selecting us to provide your healthcare needs and look forward to hearing from you soon. Sincerely,  
 
 
 
Miko Omalley. [facility] Ambulatory Nurse Navigator [address] [facility] KAREN Diana. [address] [place] [address]  [phone]

## 2018-05-21 NOTE — PATIENT INSTRUCTIONS
Accidental Overdose of Medicine: Care Instructions  Your Care Instructions    Almost any medicine can cause harm if you take too much of it. You have had treatment to help your body get rid of an overdose of a medicine. This may have been an over-the-counter medicine. Or it might be one that a doctor prescribed. It may even have been a vitamin or a supplement. During treatment, the doctor may have given you fluids and medicine. You also may have had lab tests. Then the doctor made sure that you were well enough to go home. The doctor has checked you carefully, but problems can develop later. If you notice any problems or new symptoms, get medical treatment right away. Follow-up care is a key part of your treatment and safety. Be sure to make and go to all appointments, and call your doctor if you are having problems. It's also a good idea to know your test results and keep a list of the medicines you take. How can you care for yourself at home? Home care  · Drink plenty of fluids. If you have kidney, heart, or liver disease and have to limit fluids, talk with your doctor before you increase the amount of fluids you drink. · If you normally take medicines, ask your doctor when you can start taking them again. · Read the information that comes with any medicine. If you have questions, ask your doctor or pharmacist.  Prevention  · Be safe with medicines. Take your medicines exactly as prescribed or as the label directs. Call your doctor if you think you are having a problem with your medicine. · Keep your medicines in the containers they came in. Keep them with the original labels. · Find out what to do if you miss a dose of your medicine. When should you call for help? Call 911 anytime you think you may need emergency care. For example, call if:  ? · You passed out (lost consciousness). ? · You have trouble breathing. ? · You are sleepy or hard to wake up.    ?Call your doctor now or seek immediate medical care if:  ? · You are vomiting. ? · You have a new or worse headache. ? · You are dizzy or lightheaded or feel like you may faint. ? Watch closely for changes in your health, and be sure to contact your doctor if:  ? · You do not get better as expected. Where can you learn more? Go to http://alivia-maritza.info/. Enter C165 in the search box to learn more about \"Accidental Overdose of Medicine: Care Instructions. \"  Current as of: August 14, 2016  Content Version: 11.4  © 2136-0553 [employer]. Care instructions adapted under license by [employer] (which disclaims liability or warranty for this information). If you have questions about a medical condition or this instruction, always ask your healthcare professional. [address] any warranty or liability for your use of this information.

## 2018-05-22 ENCOUNTER — OFFICE VISIT (OUTPATIENT)
Dept: INTERNAL MEDICINE CLINIC | Age: 53
End: 2018-05-22

## 2018-05-22 VITALS
SYSTOLIC BLOOD PRESSURE: 143 MMHG | HEIGHT: 63 IN | RESPIRATION RATE: 16 BRPM | TEMPERATURE: 97.7 F | BODY MASS INDEX: 30.69 KG/M2 | WEIGHT: 173.2 LBS | HEART RATE: 82 BPM | OXYGEN SATURATION: 100 % | DIASTOLIC BLOOD PRESSURE: 69 MMHG

## 2018-05-22 DIAGNOSIS — R41.0 CONFUSION: Primary | ICD-10-CM

## 2018-05-22 DIAGNOSIS — G43.909 MIGRAINE WITHOUT STATUS MIGRAINOSUS, NOT INTRACTABLE, UNSPECIFIED MIGRAINE TYPE: ICD-10-CM

## 2018-05-22 DIAGNOSIS — E78.00 HIGH CHOLESTEROL: ICD-10-CM

## 2018-05-22 DIAGNOSIS — E03.9 ACQUIRED HYPOTHYROIDISM: ICD-10-CM

## 2018-05-22 DIAGNOSIS — G45.9 TRANSIENT CEREBRAL ISCHEMIA, UNSPECIFIED TYPE: ICD-10-CM

## 2018-05-22 DIAGNOSIS — J30.9 ALLERGIC RHINITIS, UNSPECIFIED SEASONALITY, UNSPECIFIED TRIGGER: ICD-10-CM

## 2018-05-22 DIAGNOSIS — R25.1 OCCASIONAL TREMORS: ICD-10-CM

## 2018-05-22 RX ORDER — RIZATRIPTAN BENZOATE 10 MG/1
10 TABLET ORAL
Qty: 9 TAB | Refills: 0 | Status: SHIPPED | OUTPATIENT
Start: 2018-05-22 | End: 2018-09-28 | Stop reason: SDUPTHER

## 2018-05-22 RX ORDER — LEVOTHYROXINE SODIUM 100 UG/1
100 TABLET ORAL
Qty: 30 TAB | Refills: 5 | Status: SHIPPED | OUTPATIENT
Start: 2018-05-22 | End: 2018-12-03 | Stop reason: SDUPTHER

## 2018-05-22 RX ORDER — ATORVASTATIN CALCIUM 20 MG/1
20 TABLET, FILM COATED ORAL DAILY
Qty: 30 TAB | Refills: 5 | Status: SHIPPED | OUTPATIENT
Start: 2018-05-22 | End: 2018-12-18 | Stop reason: RX

## 2018-05-22 RX ORDER — FLUTICASONE PROPIONATE 50 MCG
SPRAY, SUSPENSION (ML) NASAL
Qty: 1 BOTTLE | Refills: 2 | Status: SHIPPED | OUTPATIENT
Start: 2018-05-22 | End: 2018-12-18 | Stop reason: ALTCHOICE

## 2018-05-22 NOTE — PROGRESS NOTES
HISTORY OF PRESENT ILLNESS  Jeffrey Grimes is a 47 y.o. female. HPI  Chief Complaint   Patient presents with   [address]     Patient states started confusion after taking new medications that was increased by mental health provider; Onetha Gavel was increased ; Started on cogentin for tremors  States stay in hospital could not find reason for confusion. Stated thought to have TIA    Requested refill of medications. Review of Systems   Constitutional: Negative for chills, fever and malaise/fatigue. HENT: Negative. Eyes: Negative. Respiratory: Negative. Negative for cough, shortness of breath and wheezing. Cardiovascular: Negative. Negative for chest pain and leg swelling. Gastrointestinal: Negative for abdominal pain, constipation, diarrhea, nausea and vomiting. Genitourinary: Negative. Musculoskeletal: Negative. Skin: Negative. Neurological: Negative. Psychiatric/Behavioral: Negative. Physical Exam   Constitutional: She is oriented to person, place, and time. She appears well-developed and well-nourished. No distress. HENT:   Head: Normocephalic and atraumatic. Eyes: Conjunctivae are normal.   Cardiovascular: Normal rate, regular rhythm, normal heart sounds and intact distal pulses. Exam reveals no gallop and no friction rub. No murmur heard. Pulmonary/Chest: Effort normal and breath sounds normal. No respiratory distress. She has no wheezes. She has no rales. Abdominal: Soft. Musculoskeletal: Normal range of motion. Neurological: She is alert and oriented to person, place, and time. Skin: Skin is warm and dry. She is not diaphoretic. Nursing note and vitals reviewed. Plan of care and AVS reviewed with patient who verbalized understanding. ASSESSMENT and PLAN    ICD-10-CM ICD-9-CM    1. Confusion R41.0 298.9 REFERRAL TO NEUROLOGY   2.  Acquired hypothyroidism E03.9 244.9 levothyroxine (SYNTHROID) 100 mcg tablet      TSH 3RD GENERATION 3. Migraine without status migrainosus, not intractable, unspecified migraine type G43.909 346.90 rizatriptan (MAXALT) 10 mg tablet   4. Occasional tremors R25.1 781.0 REFERRAL TO NEUROLOGY   5. Transient cerebral ischemia, unspecified type G45.9 435.9 REFERRAL TO NEUROLOGY   6. Allergic rhinitis, unspecified seasonality, unspecified trigger J30.9 477.9 fluticasone (FLONASE) 50 mcg/actuation nasal spray   7. High cholesterol E78.00 272.0 atorvastatin (LIPITOR) 20 mg tablet   Encouraged to stop cogentin  Patient to schedule appointment with neurology. Check TSH in 4 weeks.

## 2018-05-22 NOTE — MR AVS SNAPSHOT
[address] [place] [address]. [address] [facility] 
[phone] Patient: Sarah Skinner MRN: [id number] :1965 Visit Information Date & Time Provider Department Dept. Phone Encounter #  
 2018  3:00 PM Grabiel Diaz  [facility] [facility]  Primary [facility] [id number] Upcoming Health Maintenance Date Due Influenza Age 5 to Adult 2018 FOBT Q 1 YEAR AGE 50-75 2019 MEDICARE YEARLY EXAM 2019 BREAST CANCER SCRN MAMMOGRAM 2019 PAP AKA CERVICAL CYTOLOGY 2020 DTaP/Tdap/Td series (2 - Td) 2027 Allergies as of 2018  Review Complete On: 2018 By: Grabiel Diaz NP Severity Noted Reaction Type Reactions Penicillins  10/23/2017    Rash Current Immunizations  Reviewed on 2018 Name Date Pneumococcal Conjugate (PCV-13) 2018 Not reviewed this visit You Were Diagnosed With   
  
 Codes Comments Confusion    -  Primary ICD-10-CM: R41.0 ICD-9-CM: 298.9 Acquired hypothyroidism     ICD-10-CM: E03.9 ICD-9-CM: 244.9 Migraine without status migrainosus, not intractable, unspecified migraine type     ICD-10-CM: G43.909 ICD-9-CM: 346.90 Occasional tremors     ICD-10-CM: R25.1 ICD-9-CM: 781.0 Transient cerebral ischemia, unspecified type     ICD-10-CM: G45.9 ICD-9-CM: 435.9 Allergic rhinitis, unspecified seasonality, unspecified trigger     ICD-10-CM: J30.9 ICD-9-CM: 477.9 High cholesterol     ICD-10-CM: E78.00 ICD-9-CM: 272.0 Vitals BP Pulse Temp Resp Height(growth percentile) 143/69 (BP 1 Location: Right arm, BP Patient Position: Sitting) 82 97.7 °F (36.5 °C) (Temporal) 16 5' 3\" (1.6 m) Weight(growth percentile) SpO2 BMI OB Status Smoking Status 173 lb 3.2 oz (78.6 kg) 100% 30.68 kg/m2 Postmenopausal Never Smoker Vitals History BMI and BSA Data Body Mass Index Body Surface Area 30.68 kg/m 2 1.87 m 2 Preferred Pharmacy Pharmacy Name Phone [facility] [address] [facility], [facility] [address] Your Updated Medication List  
  
   
This list is accurate as of 5/22/18  4:01 PM.  Always use your most recent med list.  
  
  
  
  
 aspirin 81 mg chewable tablet Take 81 mg by mouth daily. atorvastatin 20 mg tablet Commonly known as:  LIPITOR Take 1 Tab by mouth daily. busPIRone 30 mg tablet Commonly known as:  BUSPAR Take 60 mg by mouth two (2) times a day. fluticasone 50 mcg/actuation nasal spray Commonly known as:  FLONASE  
TAke 2 sprays each nostril once a day * GEODON 20 mg capsule Generic drug:  ziprasidone Take 80 mg by mouth daily (with breakfast). * ziprasidone hcl 60 mg capsule Commonly known as:  Benna Argenis Take 60 mg by mouth daily. * lamoTRIgine 100 mg tablet Commonly known as: LaMICtal  
Take 300 mg by mouth daily. * lamoTRIgine 200 mg tablet Commonly known as: LaMICtal  
take 1 tablet by mouth once daily TAKE WITH 100 milligram tablet  
  
 levocetirizine 5 mg tablet Commonly known as:  Anitha Hurl Take 1 Tab by mouth daily. levothyroxine 100 mcg tablet Commonly known as:  SYNTHROID Take 1 Tab by mouth Daily (before breakfast). losartan 25 mg tablet Commonly known as:  COZAAR  
take 1 tablet by mouth once daily  
  
 rizatriptan 10 mg tablet Commonly known as:  [place] Take 1 Tab by mouth once as needed for Migraine for up to 1 dose. May repeat in 2 hours if needed  
  
 triamcinolone acetonide 0.1 % dental paste Commonly known as:  KENALOG  
apply to affected area three times a day VIIBRYD 40 mg Tab tablet Generic drug:  vilazodone Take 40 mg by mouth daily. zolpidem CR 12.5 mg tablet Commonly known as:  AMBIEN CR  
  
 * Notice:   This list has 4 medication(s) that are the same as other medications prescribed for you. Read the directions carefully, and ask your doctor or other care provider to review them with you. Prescriptions Sent to Pharmacy Refills  
 levothyroxine (SYNTHROID) 100 mcg tablet 5 Sig: Take 1 Tab by mouth Daily (before breakfast). Class: Normal  
 Pharmacy: [id number], [place] Ph #: [phone] Route: Oral  
 rizatriptan (MAXALT) 10 mg tablet 0 Sig: Take 1 Tab by mouth once as needed for Migraine for up to 1 dose. May repeat in 2 hours if needed Class: Normal  
 Pharmacy: [id number], [place] Ph #: [phone] Route: Oral  
 fluticasone (FLONASE) 50 mcg/actuation nasal spray 2 Sig: TAke 2 sprays each nostril once a day Class: Normal  
 [facility]acy: [id number] [facility] Ph #: [phone]  
 atorvastatin (LIPITOR) 20 mg tablet 5 Sig: Take 1 Tab by mouth daily. Class: Normal  
 Pharmacy: [id number], [place] Ph #: [phone] Route: Oral  
  
We Performed the Following REFERRAL TO NEUROLOGY [NIC29 Custom] Comments: To evaluate and treat for possible TIA, Confusion in setting of normal CT Head. To-Do List   
 05/22/2018 Lab:  TSH 3RD GENERATION Referral Information Referral ID Referred By Referred To  
  
 [id number] Gregory -[facility], [place]   
   [address] [place] [address] [address] , [address] Phone: [phone] Fax: [phone] Visits Status Start Date End Date 1 New Request 5/22/18 5/22/19 If your referral has a status of pending review or denied, additional information will be sent to support the outcome of this decision. Introducing [facility]! Dear Katlyn Garces: Thank you for requesting a [employer] account.   Our records indicate that you already have an active [employer] account. You can access your account anytime at [email or web address] Did you know that you can access your hospital and ER discharge instructions at any time in [employer]? You can also review all of your test results from your hospital stay or ER visit. Additional Information If you have questions, please visit the Frequently Asked Questions section of the [employer] website at [email or web address] Remember, [employer] is NOT to be used for urgent needs. For medical emergencies, dial 911. Now available from your iPhone and Android! Please provide this summary of care documentation to your next provider. Your primary care clinician is listed as Grabiel Diaz. If you have any questions after today's visit, please call [phone].

## 2018-05-22 NOTE — PROGRESS NOTES
Chief Complaint   Patient presents with   [address]     1. Have you been to the ER, urgent care clinic since your last visit? Hospitalized since your last visit? Yes When: 05.19.2018 Where: [facility] [place] Reason for visit: Confusion    2. Have you seen or consulted any other health care providers outside of the [address] [place] [address] [place] since your last visit? Include any pap smears or colon screening. No     There are no preventive care reminders to display for this patient.

## 2018-05-31 ENCOUNTER — OFFICE VISIT (OUTPATIENT)
Dept: NEUROLOGY | Age: 53
End: 2018-05-31

## 2018-05-31 VITALS
OXYGEN SATURATION: 98 % | HEIGHT: 63 IN | DIASTOLIC BLOOD PRESSURE: 82 MMHG | WEIGHT: 167.8 LBS | HEART RATE: 77 BPM | BODY MASS INDEX: 29.73 KG/M2 | SYSTOLIC BLOOD PRESSURE: 140 MMHG

## 2018-05-31 DIAGNOSIS — G93.40 ENCEPHALOPATHY: Primary | ICD-10-CM

## 2018-05-31 DIAGNOSIS — G25.70 MEDICATION-INDUCED MOVEMENT DISORDER: ICD-10-CM

## 2018-05-31 PROBLEM — R25.1 TREMOR: Chronic | Status: ACTIVE | Noted: 2018-05-31

## 2018-05-31 PROBLEM — G45.9 TIA (TRANSIENT ISCHEMIC ATTACK): Status: RESOLVED | Noted: 2018-05-19 | Resolved: 2018-05-31

## 2018-05-31 NOTE — PATIENT INSTRUCTIONS

## 2018-05-31 NOTE — PROGRESS NOTES
NEUROLOGY CLINIC NOTE    Patient ID:  Sergio Padilla  [id number]  75 y.o.  1965    Date of Consultation:  May 31, 2018    Referring Physician: Walt Alejandro NP    Reason for Consultation:  Tremors and question of TIA    Chief Complaint   Patient presents with    New Patient     tremors/? TIA May 19,2018       History of Present Illness:     Patient Active Problem List    Diagnosis Date Noted    TIA (transient ischemic attack) 05/19/2018    Essential hypertension 02/02/2018    High cholesterol 02/02/2018    Acquired hypothyroidism 12/22/2017     Past Medical History:   Diagnosis Date    Allergic rhinitis     Anxiety     Bipolar disorder ([address]     [id number]    Depression     DX 2014-15/ [address] [facility] [place] - counselor    Environmental allergies     High cholesterol     Hypertension     > 10 years    Lichen planus     Located Mouth/Dentist/ DX 5 years / Dr. Mary Bennett, [facility].  Migraine     2014 - 2015    PTSD (post-traumatic stress disorder)     2014 - 2015    Tendonitis     Both feet/    Thyroid disease     20 year ago    TIA (transient ischemic attack)       Past Surgical History:   Procedure Laterality Date    HX CYST INCISION AND DRAINAGE        Prior to Admission medications    Medication Sig Start Date End Date Taking? Authorizing Provider   levothyroxine (SYNTHROID) 100 mcg tablet Take 1 Tab by mouth Daily (before breakfast). 5/22/18  Yes Miguel Zhao NP   rizatriptan (MAXALT) 10 mg tablet Take 1 Tab by mouth once as needed for Migraine for up to 1 dose. May repeat in 2 hours if needed 5/22/18  Yes Miguel Zhao NP   fluticasone [facility]) 50 mcg/actuation nasal spray TAke 2 sprays each nostril once a day 5/22/18  Yes Miguel Zhao NP   atorvastatin (LIPITOR) 20 mg tablet Take 1 Tab by mouth daily. 5/22/18  Yes Miguel Zhao NP   aspirin 81 mg chewable tablet Take 81 mg by mouth daily.    Yes Historical Provider levocetirizine (XYZAL) 5 mg tablet Take 1 Tab by mouth daily. 3/14/18  Yes Sarita Mccollum NP   losartan (COZAAR) 25 mg tablet take 1 tablet by mouth once daily 3/7/18  Yes Sarita Mccollum NP   zolpidem CR (AMBIEN CR) 12.5 mg tablet  2/26/18  Yes Historical Provider   ziprasidone hcl (GEODON) 60 mg capsule Take 60 mg by mouth daily. 2/26/18  Yes Historical Provider   triamcinolone acetonide (KENALOG) 0.1 % dental paste apply to affected area three times a day 1/30/18  Yes Historical Provider   lamoTRIgine (LAMICTAL) 200 mg tablet take 1 tablet by mouth once daily TAKE WITH 100 milligram tablet 12/1/17  Yes Historical Provider   lamoTRIgine (LAMICTAL) 100 mg tablet Take 300 mg by mouth daily. Yes Phys Other, MD   busPIRone (BUSPAR) 30 mg tablet Take 60 mg by mouth two (2) times a day. Yes Phys Other, MD   vilazodone (VIIBRYD) 40 mg tab tablet Take 40 mg by mouth daily. Yes Phys Other, MD   ziprasidone (GEODON) 20 mg capsule Take 80 mg by mouth daily (with breakfast). Phys Other, MD     Allergies   Allergen Reactions    Penicillins Rash      Social History   Substance Use Topics    Smoking status: Never Smoker    Smokeless tobacco: Never Used    Alcohol use No      Family History   Problem Relation Age of Onset    Other Mother      ABD aneurysm, carotid stenosis    High Cholesterol Father         Subjective:      Meryl Goss is a 47 y.o. NKAV with history of anxiety, depression, bipolar disorder, PTSD, migraines, hypertension, hypercholesterolemia and thyroid disorder who was referred here by Keyla DUMONT) for further evaluation of possible TIA and tremors. Per record and patient she was seen and admitted from the ER at \[facility]\"" last 5/19/2018 for altered mental status. Condition started 3 to 5 hours prior. Periods of confusion. No somnolence, no seizures, no unresponsiveness, no weakness, no agitation, no delusions, no hallucinations, no self-injury, no violence, no tingling and no numbness.  History in the ER was largely supplied by her spouse. Patient kept answering she cannot remember to most questions. Patient drove to [place], [place] the day before for a family gathering. She felt well then. She left there at 03:00 this morning and drove herself home. Once home, she spoke to her daughter who then notified the patient's  that she seemed confused. He called the patient at home and confirmed that the patient was confused. She couldn't remember her 's name. Her words were clear, in sentences, but inappropriate. She said her vision \"feels like it is vibrating back and forth. \" She also feels more tremulous than usual (frequent feels tremulous). She had a similar problem about 2 years ago of confusion and anxiety. She was reportedly diagnosed with a TIA. Head CT without contrast was negative. Labs revealed unremarkable CBC, CMP, alcohol and ABHISHEK screen. Low TSH (0.16) and low magnesium (1.4). Elevated cholesterol and LDL. Patient was discharged 5/20/2018. Patient was seen by her PCP last 5/22/2018. Note mentions Geodon was increased and patient was started on Cogentin for tremors prior to the episode of confusion that led to the ER visit and admission. Patient was told to stop Cogentin and referred here for the tremors and altered mental status. Per patient since then, her mentation is at baseline. No recurrence of the episode of confusion and memory lapses. She feels more tremulous especially with her hands since changes to her medication was done. Outside reports reviewed: office notes, ER records, radiology reports, lab reports.     Review of Systems:    A comprehensive review of systems was negative except for:   Constitutional: positive for none  Eyes: positive for none  Ears, nose, mouth, throat, and face: positive for snoring  Respiratory: positive for negative  Cardiovascular: positive for none  Gastrointestinal: positive for dysphagia  Genitourinary: positive for none  Integument/breast: positive for none  Hematologic/lymphatic: positive for none  Musculoskeletal: positive for none  Neurological: positive for headaches and tremor  Behavioral/Psych: positive for anxiety and depression  Endocrine: positive for none  Allergic/Immunologic: positive for none    Objective:     Visit Vitals    /82    Pulse 77    Ht 5' 3\" (1.6 m)    Wt 167 lb 12.8 oz (76.1 kg)    SpO2 98%    BMI 29.72 kg/m2       PHYSICAL EXAM:    General Appearance: Alert, patient appears stated age. General:  Well developed, well nourished patent in no apparent distress. Head/Face: The head is normocephalic and atraumatic. Eyes: Conjunctivae appear normal. Sclera appear normal and non-icteric. Nose (and Sinus):   No abnormality of the nose or sinuses is noted. Oral:   Throat is clear. Lymphatics:  No lymphadenopathy in the neck/head. Neck and Thyroid:   No bruits noted in the neck. Respiratory:  Lungs clear to auscultation. Cardiovascular:  Palpation and auscultation: regular rate and rhythm. Extremity: No joint swelling, erythema or pedal edema. NEUROLOGICAL EXAM:    Appearance: The patient is well developed, well nourished, provides a coherent history and is in no acute distress. Mental Status: Oriented to time, place and person. Fluent, no aphasia or dysarthria. Flat affect and masked facies. MMSE 29/30 (missed one of 3 on recall but with prodding score 30/30). Cranial Nerves:   Intact visual fields. Fundi are benign. ROGELIO, EOM's full, no nystagmus, no ptosis. Facial sensation is normal. Corneal reflexes are intact. Facial movement is symmetric. Hearing is normal bilaterally. Palate is midline with normal elevation. Sternocleidomastoid and trapezius muscles are normal. Tongue is midline. Motor:  5/5 strength in upper and lower proximal and distal muscles. Normal bulk and slight increase tone UE. No pronator drift. Slight bradykinesia.    Reflexes:   Deep tendon reflexes 2+/4 and symmetrical. Downgoing toes. Sensory:   Normal to cold, pinprick and vibration. Gait:  No Romberg. Slight problems tandem walking. Right finger pill rolling observed when walking but good arm swing. No stoop posture. No turn en bloc. Tremor:   Tremulous whole body. Postural and intentional UE R>L tremors noted. Cerebellar:  Intact FTN/ISAURO/HTS. Neurovascular:  Normal heart sounds and regular rhythm, peripheral pulses intact, and no carotid bruits. Imaging  CT Head: obtained and reviewed    Labs Reviewed      Assessment:   Encephalopathy - improved  Medication induced movement disorder - worsening    Plan:   1. Neurological examination reveals no significant cognitive deficits. Cognitive testing was done and patient scored 29 out of 30. When prodded scored 30 out of 30. No evidence of any significant dementia process. Review of the hospitalization records including laboratory and imaging studies were done. Results discussed with patient. Likely cause of the patient's episode of transient memory lapses and confusion is more consistent with an encephalopathy. Likely due to medication and metabolic issues (hypomagnesemia, hyperthyroidism and etc.). No indication at this time to do any other neuroimaging or studies. Episode is not consistent with a TIA. 2. Neurological examination reveals whole body tremulousness with intentional and postural UE tremors R>L but none at rest.  Right finger pill-rolling seen when walking. Masked faces and slight bradykinesia was seen. However no other pathognomonic features typically seen in Parkinson's disease were observed. Findings c/w medication induced movement disorder or parkinsonism until proven otherwise. Findings discussed extensively with the patient. Discussed with patient that likely the use of current medication for her psychiatric condition and its benefit outweighs the side effects.   Advised to discuss this with there is psychiatrist about potential lowering of dosages of her current psych medications. Discussed treatment options. Restart benztropine at 0.5 mg daily ×1 week and then twice daily if necessary. Prescription was provided. Further titration will depend on response and tolerability. All questions and concerns were answered.

## 2018-06-01 ENCOUNTER — TELEPHONE (OUTPATIENT)
Dept: NEUROLOGY | Age: 53
End: 2018-06-01

## 2018-06-01 NOTE — TELEPHONE ENCOUNTER
----- Message from Karen Hong sent at 6/1/2018 10:04 AM EDT -----  Regarding: Dr. Ryanne Gregorio  The patient is requesting a call back in regards to Rx Benztropine not being called into the pharmacy.  (v)[phone]

## 2018-06-01 NOTE — TELEPHONE ENCOUNTER
Spoke with patient to let her known I have sent a message to Dr. Roslyn Mejia regarding medication (Benztropine).

## 2018-06-04 RX ORDER — BENZTROPINE MESYLATE 0.5 MG/1
0.5 TABLET ORAL 2 TIMES DAILY
Qty: 60 TAB | Refills: 1 | Status: SHIPPED | OUTPATIENT
Start: 2018-06-04 | End: 2018-07-05 | Stop reason: SDUPTHER

## 2018-06-04 NOTE — TELEPHONE ENCOUNTER
----- Message from Kim Okeefe sent at 6/1/2018  4:29 PM EDT -----  Regarding: Dr Trevino/rx  (R)[phone], pt f/u on message left yesterday regarding her new rx for Benztropine, for her [address], the one listed in her chart.

## 2018-06-04 NOTE — TELEPHONE ENCOUNTER
Spoke with patient that Dr. Reggie Perry ordered her medication this morning, she can checked a little later today. Patient understood.

## 2018-06-05 ENCOUNTER — PATIENT OUTREACH (OUTPATIENT)
Dept: INTERNAL MEDICINE CLINIC | Age: 53
End: 2018-06-05

## 2018-06-05 NOTE — PROGRESS NOTES
Goals      Establish PCP relationships and regularly scheduled appointments. 5/21    NN reminded pt.,follow-up care is a key part of your treatment and safety. Be sure to make and go to all appointments, and call your doctor if you are having problems. It's also a good idea to know your test results and keep a list of the medicines. Pt.verbalizes understanding, reports she will call and schedule her f/u appt. -[address]     5/22 Pt. Seen by PCP f/u hospital admission on 5/19, Dx. JAVIER. MENG Marin recommendations:  REFERRAL TO NEUROLOGY [OEG36 Custom]       Comments:     To evaluate and treat for possible TIA, Confusion in setting of normal CT Head.       5/31 Pt. F/u with Trang Alan MD (Neurology), Findings c/w medication induced movement disorder or parkinsonism until proven otherwise. 6/5 Follow ups: pt.will attend upcoming appt. on 6/18 @ 9am (Nurse Visit) TPC, upcoming appt. with (Neurology) on 7/5/18 @ 10:40 am. Patient aware of f/u appointment; pt.reports she will attend appointments. -           Knowledge and adherence of prescribed medication (ie. action, side effects, missed dose, etc.).            5/21   Patient will understand general knowledge of medications, s/e and take meds as prescribed. Pt.reports taking medication as directed. Reports able to fill pill boxes, and familiar with medications and why is taking them. NN encourage to call with any questions or concerns. NN provide pt.with contact information. -[address]    5/31 Trang Alan MD, Neurology - restart benztropine at 0.5 mg daily ×1 week and then twice daily if necessary. 6/5 Patient reports she taking medications as directed, reports no other c/o confusion, but continues have tremors. Pt.reports she will see Behavioral Health f/u with medications adjustments. -         Supportive resources in place to maintain patient in the community (ie., home health, equipment, DME, refer to, etc.)            5/21 Patient lives with  and has son who very supportive  in her care. NN spoke with patient to develop new system for organizing meds and dispensing. Provided pt.idea, or suggested pt. think of way to remind herself that she has already taken her meds. NN also mailed pt.educational information on TIA and Accidental Over Medicating. NN will f/u with any question with next outreach. -[address]    6/5 Patien continue live with  and have one son, pt.  very supportive of her care. Pt. Without further c/o confusion episodes; will f/u with Behavioral Health meds adjustments. Pt.has developed new way of organizing meds. NN f/u with information mailed to pt.;\"TIA and Accidental Over Medicating\", no questions at this time. .NN encourage to call if needed with any questions. -[address]

## 2018-06-11 ENCOUNTER — TELEPHONE (OUTPATIENT)
Dept: INTERNAL MEDICINE CLINIC | Age: 53
End: 2018-06-11

## 2018-06-11 NOTE — TELEPHONE ENCOUNTER
Pts psychiatrist wanted to know if Ida (Tracy Stahl) would have any interactions with any of her other meds. Please call her at [phone].

## 2018-06-12 ENCOUNTER — PATIENT OUTREACH (OUTPATIENT)
Dept: INTERNAL MEDICINE CLINIC | Age: 53
End: 2018-06-12

## 2018-06-12 DIAGNOSIS — R09.81 NASAL SINUS CONGESTION: ICD-10-CM

## 2018-06-12 NOTE — TELEPHONE ENCOUNTER
Requested Prescriptions     Pending Prescriptions Disp Refills    levocetirizine (XYZAL) 5 mg tablet 90 Tab 1     Sig: Take 1 Tab by mouth daily.     losartan (COZAAR) 25 mg tablet 90 Tab 1     Sig: take 1 tablet by mouth once daily

## 2018-06-12 NOTE — PROGRESS NOTES
Goals        Patient Stated     Supportive resources in place to maintain patient in the community (ie., home health, equipment, DME, refer to, etc.) (pt-stated)            5/21 Patient lives with  and has son who very supportive  in her care. NN spoke with patient to develop new system for organizing meds and dispensing. Provided pt.idea, or suggested pt. think of way to remind herself that she has already taken her meds. NN also mailed pt.educational information on TIA and Accidental Over Medicating. NN will f/u with any question with next outreach. -[address]    6/5 Patien continue live with  and have one son, pt.  very supportive of her care. Pt. Without further c/o confusion episodes; will f/u with Behavioral Health meds adjustments. Pt.has developed new way of organizing meds. 6/5 NN f/u with information mailed to pt.;\"TIA and Accidental Over Medicating\", no questions at this time. .NN encourage to call if needed with any questions. -[address]    6/12 Patient lives with  and has son who very supportive  in her care. -         Other     Establish PCP relationships and regularly scheduled appointments. 5/21    NN reminded pt.,follow-up care is a key part of your treatment and safety. Be sure to make and go to all appointments, and call your doctor if you are having problems. It's also a good idea to know your test results and keep a list of the medicines. Pt.verbalizes understanding, reports she will call and schedule her f/u appt. -[address]     5/22 Pt. Seen by PCP f/u hospital admission on 5/19, Dx. TIA. MENG Marin recommendations:  REFERRAL TO NEUROLOGY [XSV70 Custom]       Comments:     To evaluate and treat for possible TIA, Confusion in setting of normal CT Head.       5/31 Pt. F/u with Gladis Pitts MD (Neurology), Findings c/w medication induced movement disorder or parkinsonism until proven otherwise. 6/5 Follow ups: pt.will attend upcoming appt. on 6/18 @ 9am (Nurse Visit) TPC, upcoming appt. with (Neurology) on 7/5/18 @ 10:40 am. Patient aware of f/u appointment; pt.reports she will attend appointments. -[address]    6/12 Patient reports she is doing well, no c/o confusion, tremors has improved slightly. NN reminded pt. upcoming appt. with nurse for labs on 6/19/18 @ 9 am.Patient reports she will attend schedule appt. , will f/u next with Dr. Fortino Mckeon on 7/5/18 @ 10:40 am.-           Knowledge and adherence of prescribed medication (ie. action, side effects, missed dose, etc.).            5/21   Patient will understand general knowledge of medications, s/e and take meds as prescribed. Pt.reports taking medication as directed. Reports able to fill pill boxes, and familiar with medications and why is taking them. NN encourage to call with any questions or concerns. NN provide pt.with contact information. -[address]    5/31 Brannon Gallego MD, Neurology - restart benztropine at 0.5 mg daily ×1 week and then twice daily if necessary. 6/5 Patient reports she taking medications as directed, reports no other c/o confusion, but continues have tremors. Pt.reports she will see Behavioral Health f/u with medications adjustments. -[address]      6/12  Patient reports she taking medications as directed, reports no other c/o confusion, but continues have tremors. Pt reports f/u with Dr. Fortino Mckeon (Neurology) on 7/5/18.-

## 2018-06-14 RX ORDER — LOSARTAN POTASSIUM 25 MG/1
TABLET ORAL
Qty: 90 TAB | Refills: 1 | Status: SHIPPED | OUTPATIENT
Start: 2018-06-14 | End: 2018-12-08 | Stop reason: SDUPTHER

## 2018-06-14 RX ORDER — LEVOCETIRIZINE DIHYDROCHLORIDE 5 MG/1
5 TABLET, FILM COATED ORAL DAILY
Qty: 90 TAB | Refills: 1 | Status: SHIPPED | OUTPATIENT
Start: 2018-06-14 | End: 2018-11-29 | Stop reason: SDUPTHER

## 2018-06-14 NOTE — TELEPHONE ENCOUNTER
Requested Prescriptions     Pending Prescriptions Disp Refills    levocetirizine (XYZAL) 5 mg tablet 90 Tab 1     Sig: Take 1 Tab by mouth daily.     losartan (COZAAR) 25 mg tablet 90 Tab 1     Sig: take 1 tablet by mouth once daily     Future Appointments:  6/19/2018  9:00 AM    NURSE TPC                     Last Appointment With Me:  05.22.2018  Last Filled:  levocetirizine  03.14.2018  Losartan  03.07.2018  Changes Made to Medication on Last Visit:  None

## 2018-06-19 ENCOUNTER — CLINICAL SUPPORT (OUTPATIENT)
Dept: INTERNAL MEDICINE CLINIC | Age: 53
End: 2018-06-19

## 2018-06-19 VITALS
HEART RATE: 60 BPM | RESPIRATION RATE: 16 BRPM | DIASTOLIC BLOOD PRESSURE: 69 MMHG | SYSTOLIC BLOOD PRESSURE: 120 MMHG | OXYGEN SATURATION: 98 %

## 2018-06-19 DIAGNOSIS — E03.9 ACQUIRED HYPOTHYROIDISM: Primary | ICD-10-CM

## 2018-06-19 NOTE — PROGRESS NOTES
Patient in for lab draw only for TSh level per order of Maryann England NP - labs drawn and sent out to [facility] - patient will be notified of results  Render WENDY Armenta  7/67/2289  8:80 AM

## 2018-06-20 ENCOUNTER — PATIENT OUTREACH (OUTPATIENT)
Dept: INTERNAL MEDICINE CLINIC | Age: 53
End: 2018-06-20

## 2018-06-20 LAB — TSH SERPL DL<=0.005 MIU/L-ACNC: 0.2 UIU/ML (ref 0.45–4.5)

## 2018-06-20 NOTE — PROGRESS NOTES
Patient has had no ED visits or hospitalizations within the past 30 days. Attended follow up ROSA MARIA GRAYSON appointment with MENG Marin on 5/22/18. Goals met. No other needs identified to Nurse Navigator at this time. Resolving HOLGER episode. -[address]

## 2018-06-22 ENCOUNTER — TELEPHONE (OUTPATIENT)
Dept: INTERNAL MEDICINE CLINIC | Age: 53
End: 2018-06-22

## 2018-06-22 DIAGNOSIS — E03.9 ACQUIRED HYPOTHYROIDISM: Primary | ICD-10-CM

## 2018-06-22 NOTE — TELEPHONE ENCOUNTER
----- Message from Brina Gerber sent at 6/22/2018  8:32 AM EDT -----  Regarding: NP Lopez/Telephone  Pt would like a return call in reference to her lab results for her thyroid.      Pt wanted to know what should be the next step as far as medication    Best contact is [phone]

## 2018-06-23 RX ORDER — LEVOTHYROXINE SODIUM 88 UG/1
88 TABLET ORAL
Qty: 30 TAB | Refills: 5 | Status: SHIPPED | OUTPATIENT
Start: 2018-06-23 | End: 2018-12-08 | Stop reason: DRUGHIGH

## 2018-06-23 NOTE — PROGRESS NOTES
Send results letter as below:  Your recent lab showed the following abnomality thyroid pill too string, I have called in a lower dosage like we discussed. We need you to return to re-check the laboratory value again in 2-3 months. Sumi Mcdonald

## 2018-07-03 ENCOUNTER — TELEPHONE (OUTPATIENT)
Dept: INTERNAL MEDICINE CLINIC | Age: 53
End: 2018-07-03

## 2018-07-03 NOTE — TELEPHONE ENCOUNTER
Chief Complaint   Patient presents with    Triage     Patient called into the office and had complaints of vomiting and feeling of lightheaded x 2 days. Denies fever/chills. Denies pain. Denies blurry vision. No decrease in appetite. Has abdominal pain in relation to vomiting. Advised patient of no openings in current schedule. Advised patient to drink plenty of fluids and be well hydrated and try a bland diet. Patient states that she has the OTC Nauzene. Researched Nauzene and found no contraindications in regards to any medications that the patient is currently taking. Advise patient to go to the nearest ER or urgent care if symptoms worsen. Understanding verbalized.

## 2018-07-05 ENCOUNTER — OFFICE VISIT (OUTPATIENT)
Dept: NEUROLOGY | Age: 53
End: 2018-07-05

## 2018-07-05 VITALS
WEIGHT: 168.6 LBS | DIASTOLIC BLOOD PRESSURE: 70 MMHG | HEIGHT: 63 IN | BODY MASS INDEX: 29.88 KG/M2 | OXYGEN SATURATION: 92 % | HEART RATE: 66 BPM | SYSTOLIC BLOOD PRESSURE: 118 MMHG

## 2018-07-05 DIAGNOSIS — G93.40 ENCEPHALOPATHY: Primary | ICD-10-CM

## 2018-07-05 DIAGNOSIS — G25.70 MEDICATION-INDUCED MOVEMENT DISORDER: ICD-10-CM

## 2018-07-05 RX ORDER — BENZTROPINE MESYLATE 0.5 MG/1
0.5 TABLET ORAL 2 TIMES DAILY
Qty: 60 TAB | Refills: 5 | Status: SHIPPED | OUTPATIENT
Start: 2018-07-05 | End: 2018-12-18 | Stop reason: ALTCHOICE

## 2018-07-05 NOTE — PROGRESS NOTES
NEUROLOGY CLINIC NOTE    Patient ID:  Joan Barksdale  [id number]  73 y.o.  1965    Date of Consultation:  July 5, 2018    Referring Physician: Faisal Villegas NP    Reason for Consultation:  Tremors and question of TIA    Chief Complaint   Patient presents with    Follow-up     Tremor same/Memory       History of Present Illness:     Patient Active Problem List    Diagnosis Date Noted    Tremor 05/31/2018    Essential hypertension 02/02/2018    High cholesterol 02/02/2018    Acquired hypothyroidism 12/22/2017     Past Medical History:   Diagnosis Date    Allergic rhinitis     Anxiety     Bipolar disorder ([address]     [id number]    Depression     DX 2014-15/ [address], [facility]./ Crista Carbo - counselor    Environmental allergies     High cholesterol     Hypertension     > 10 years    Lichen planus     Located Mouth/Dentist/ DX 5 years / Dr. Donna Queen, [facility].  Migraine     2014 - 2015    PTSD (post-traumatic stress disorder)     2014 - 2015    Tendonitis     Both feet/    Thyroid disease     20 year ago    TIA (transient ischemic attack)       Past Surgical History:   Procedure Laterality Date    HX CYST INCISION AND DRAINAGE        Prior to Admission medications    Medication Sig Start Date End Date Taking? Authorizing Provider   levothyroxine (SYNTHROID) 88 mcg tablet Take 1 Tab by mouth Daily (before breakfast). 6/23/18  Yes Angie Silver MD   levocetirizine (XYZAL) 5 mg tablet Take 1 Tab by mouth daily. 6/14/18  Yes Angie Silver MD   losartan (COZAAR) 25 mg tablet take 1 tablet by mouth once daily 6/14/18  Yes Angie Silver MD   benztropine (COGENTIN) 0.5 mg tablet Take 1 Tab by mouth two (2) times a day. Take 1 tab at bedtime x 1 week; then 1 tab BID  Indications: drug-induced extrapyramidal reaction 6/4/18  Yes Marimar Allen MD   levothyroxine (SYNTHROID) 100 mcg tablet Take 1 Tab by mouth Daily (before breakfast).  5/22/18  Yes Magaly Khoury Padmini Magana NP   rizatriptan (MAXALT) 10 mg tablet Take 1 Tab by mouth once as needed for Migraine for up to 1 dose. May repeat in 2 hours if needed 5/22/18  Yes Shannan Hartmann NP   fluticasone [facility]) 50 mcg/actuation nasal spray TAke 2 sprays each nostril once a day 5/22/18  Yes Shannan Hartmann NP   atorvastatin (LIPITOR) 20 mg tablet Take 1 Tab by mouth daily. 5/22/18  Yes Shannan Hartmann NP   aspirin 81 mg chewable tablet Take 81 mg by mouth daily. Yes Historical Provider   zolpidem CR (AMBIEN CR) 12.5 mg tablet  2/26/18  Yes Historical Provider   ziprasidone hcl (GEODON) 60 mg capsule Take 60 mg by mouth daily. 2/26/18  Yes Historical Provider   triamcinolone acetonide (KENALOG) 0.1 % dental paste apply to affected area three times a day 1/30/18  Yes Historical Provider   lamoTRIgine (LAMICTAL) 200 mg tablet take 1 tablet by mouth once daily TAKE WITH 100 milligram tablet 12/1/17  Yes Historical Provider   lamoTRIgine (LAMICTAL) 100 mg tablet Take 300 mg by mouth daily. Yes Phys Other, MD   ziprasidone (GEODON) 20 mg capsule Take 80 mg by mouth daily (with breakfast). Yes Phys Other, MD   busPIRone (BUSPAR) 30 mg tablet Take 60 mg by mouth two (2) times a day. Yes Phys Other, MD   vilazodone (VIIBRYD) 40 mg tab tablet Take 40 mg by mouth daily. Yes Phys Other, MD     Allergies   Allergen Reactions    Penicillins Rash      Social History   Substance Use Topics    Smoking status: Never Smoker    Smokeless tobacco: Never Used    Alcohol use No      Family History   Problem Relation Age of Onset    Other Mother      ABD aneurysm, carotid stenosis    High Cholesterol Father         Subjective:      Noah Dorsey is a 47 y.o. QWMD with history of anxiety, depression, bipolar disorder, PTSD, migraines, hypertension, hypercholesterolemia and thyroid disorder who was referred here by Keith Mcneill (MENG) for further evaluation of possible TIA and tremors.  Per record and patient she was seen and admitted from the ER at [facility] last 5/19/2018 for altered mental status. Condition started 3 to 5 hours prior. Periods of confusion. No somnolence, no seizures, no unresponsiveness, no weakness, no agitation, no delusions, no hallucinations, no self-injury, no violence, no tingling and no numbness. History in the ER was largely supplied by her spouse. Patient kept answering she cannot remember to most questions. Patient drove to [place], [place] the day before for a family gathering. She felt well then. She left there at 03:00 this morning and drove herself home. Once home, she spoke to her daughter who then notified the patient's  that she seemed confused. He called the patient at home and confirmed that the patient was confused. She couldn't remember her 's name. Her words were clear, in sentences, but inappropriate. She said her vision \"feels like it is vibrating back and forth. \" She also feels more tremulous than usual (frequent feels tremulous). She had a similar problem about 2 years ago of confusion and anxiety. She was reportedly diagnosed with a TIA. Head CT without contrast was negative. Labs revealed unremarkable CBC, CMP, alcohol and ABHISHEK screen. Low TSH (0.16) and low magnesium (1.4). Elevated cholesterol and LDL. Patient was discharged 5/20/2018. Patient was seen by her PCP last 5/22/2018. Note mentions Geodon was increased and patient was started on Cogentin for tremors prior to the episode of confusion that led to the ER visit and admission. Patient was told to stop Cogentin and referred here for the tremors and altered mental status. Per patient since then, her mentation is at baseline. No recurrence of the episode of confusion and memory lapses. She feels more tremulous especially with her hands since changes to her medication was done. Since the last visit on 5/31/2018, patient and  reports improvement of her tremors. She takes Benztropine 0.5 mg BID. Denies any side effects.  She did have issues with elevated BP when she forgot to take it for several days. She still has issues per daughter of repeating herself.  does not report any other cognitive issues. Outside reports reviewed: none    Review of Systems:    A comprehensive review of systems was negative except for:   Neurological: positive for tremors  Behavioral/Psych: positive for anxiety and depression, panic attacks    Objective:     Visit Vitals    /70    Pulse 66    Ht 5' 3\" (1.6 m)    Wt 168 lb 9.6 oz (76.5 kg)    SpO2 92%    BMI 29.87 kg/m2       PHYSICAL EXAM:      NEUROLOGICAL EXAM:    Appearance: The patient is well developed, well nourished, provides a coherent history and is in no acute distress. Mental Status: Oriented to time, place and person. Fluent, no aphasia or dysarthria. Flat affect and masked facies. Cranial Nerves:   II - XII were intact. Motor:  5/5 strength in upper and lower proximal and distal muscles. Normal bulk and tone UE. No pronator drift. No bradykinesia. Reflexes:   Deep tendon reflexes 2+/4 and symmetrical. Downgoing toes. Sensory:   Normal to cold, pinprick and vibration. Gait:  No Romberg. Slight problems tandem walking. No right finger pill rolling observed when walking today and good arm swing. No stoop posture. No turn en bloc. Tremor:   No observed tremulousness of her whole body. No obvious postural and intentional UE tremors noted today. Cerebellar:  Intact FTN/ISAURO/HTS. Neurovascular:  Normal heart sounds and regular rhythm, peripheral pulses intact, and no carotid bruits. Assessment:   Encephalopathy - stable  Medication induced movement disorder - improved    Plan:   1. No new cognitive deficits. Previous cognitive testing score was 29 out of 30. When prodded scored 30 out of 30. No evidence of any significant dementia process. Review of the hospitalization records including laboratory and imaging studies were done.  Likely cause of the patient's episode of transient memory lapses and confusion is more consistent with an encephalopathy. Likely due to medication and metabolic issues (hypomagnesemia, hyperthyroidism and etc.). No indication at this time to do any other neuroimaging or studies. Episode is not consistent with a TIA. Discussed future neuropsychological testing if cognitive issues worsen. 2. Neurological examination reveals improvement with no whole body tremulousness or intentional and postural UE tremors seen. No right finger pill-rolling seen when walking as well. Masked faces. No bradykinesia or UE increase tone was seen today. Response to medication is c/w medication induced movement disorder or parkinsonism until proven otherwise. Reminded patient that likely the use of current medication for her psychiatric condition and its benefit outweighs the side effects. Encouraged to discuss this with her psychiatrist about potential lowering of dosages of her current psych medications. Continue Benztropine at 0.5 mg BID. Prescription was provided. All questions and concerns were answered.

## 2018-07-05 NOTE — PATIENT INSTRUCTIONS
A Healthy Lifestyle: Care Instructions  Your Care Instructions    A healthy lifestyle can help you feel good, stay at a healthy weight, and have plenty of energy for both work and play. A healthy lifestyle is something you can share with your whole family. A healthy lifestyle also can lower your risk for serious health problems, such as high blood pressure, heart disease, and diabetes. You can follow a few steps listed below to improve your health and the health of your family. Follow-up care is a key part of your treatment and safety. Be sure to make and go to all appointments, and call your doctor if you are having problems. It's also a good idea to know your test results and keep a list of the medicines you take. How can you care for yourself at home? · Do not eat too much sugar, fat, or fast foods. You can still have dessert and treats now and then. The goal is moderation. · Start small to improve your eating habits. Pay attention to portion sizes, drink less juice and soda pop, and eat more fruits and vegetables. ¨ Eat a healthy amount of food. A 3-ounce serving of meat, for example, is about the size of a deck of cards. Fill the rest of your plate with vegetables and whole grains. ¨ Limit the amount of soda and sports drinks you have every day. Drink more water when you are thirsty. ¨ Eat at least 5 servings of fruits and vegetables every day. It may seem like a lot, but it is not hard to reach this goal. A serving or helping is 1 piece of fruit, 1 cup of vegetables, or 2 cups of leafy, raw vegetables. Have an apple or some carrot sticks as an afternoon snack instead of a candy bar. Try to have fruits and/or vegetables at every meal.  · Make exercise part of your daily routine. You may want to start with simple activities, such as walking, bicycling, or slow swimming. Try to be active 30 to 60 minutes every day. You do not need to do all 30 to 60 minutes all at once.  For example, you can exercise 3 times a day for 10 or 20 minutes. Moderate exercise is safe for most people, but it is always a good idea to talk to your doctor before starting an exercise program.  · Keep moving. [employer] the lawn, work in the garden, or [employer]. Take the stairs instead of the elevator at work. · If you smoke, quit. People who smoke have an increased risk for heart attack, stroke, cancer, and other lung illnesses. Quitting is hard, but there are ways to boost your chance of quitting tobacco for good. ¨ Use nicotine gum, patches, or lozenges. ¨ Ask your doctor about stop-smoking programs and medicines. ¨ Keep trying. In addition to reducing your risk of diseases in the future, you will notice some benefits soon after you stop using tobacco. If you have shortness of breath or asthma symptoms, they will likely get better within a few weeks after you quit. · Limit how much alcohol you drink. Moderate amounts of alcohol (up to 2 drinks a day for men, 1 drink a day for women) are okay. But drinking too much can lead to liver problems, high blood pressure, and other health problems. Family health  If you have a family, there are many things you can do together to improve your health. · Eat meals together as a family as often as possible. · Eat healthy foods. This includes fruits, vegetables, lean meats and dairy, and whole grains. · Include your family in your fitness plan. Most people think of activities such as jogging or tennis as the way to fitness, but there are many ways you and your family can be more active. Anything that makes you breathe hard and gets your heart pumping is exercise. Here are some tips:  ¨ Walk to do errands or to take your child to school or the bus. ¨ Go for a family bike ride after dinner instead of watching TV. Where can you learn more? Go to http://alivia-maritza.info/. Enter G724 in the search box to learn more about \"A Healthy Lifestyle: Care Instructions. \"  Current as of: May 12, 2017  Content Version: 11.4  © 8373-6419 [employer], [employer]. Care instructions adapted under license by [employer] (which disclaims liability or warranty for this information). If you have questions about a medical condition or this instruction, always ask your healthcare professional. [address] any warranty or liability for your use of this information. Please be advised there is a $25 fee for all paperwork to be completed from our  providers. This is to be paid by the patient prior to picking up the completed forms.

## 2018-07-05 NOTE — MR AVS SNAPSHOT
[facility] [address], 
[id number], [address] [facility] 
[phone] Patient: Juliana Barker MRN: [id number] :1965 Visit Information Date & Time Provider Department Dept. Phone Encounter #  
 2018 10:40 AM Michaela Swanson MD [facility]rology [facility] at [facility] [phone] [id number] Follow-up Instructions Return in about 6 months (around 2019) for memory loss, tremors. Upcoming Health Maintenance Date Due Influenza Age 5 to Adult 2018 FOBT Q 1 YEAR AGE 50-75 2019 BREAST CANCER SCRN MAMMOGRAM 2019 PAP AKA CERVICAL CYTOLOGY 2020 DTaP/Tdap/Td series (2 - Td) 2027 Allergies as of 2018  Review Complete On: 2018 By: Jimena Mullen Severity Noted Reaction Type Reactions Penicillins  10/23/2017    Rash Current Immunizations  Reviewed on 2018 Name Date Pneumococcal Conjugate (PCV-13) 2018 Not reviewed this visit You Were Diagnosed With   
  
 Codes Comments Encephalopathy    -  Primary ICD-10-CM: G93.40 ICD-9-CM: 348.30 Medication-induced movement disorder     ICD-10-CM: G25.70 ICD-9-CM: 333.90, E980.5 Vitals BP Pulse Height(growth percentile) Weight(growth percentile) SpO2 BMI  
 118/70 66 5' 3\" (1.6 m) 168 lb 9.6 oz (76.5 kg) 92% 29.87 kg/m2 OB Status Smoking Status Postmenopausal Never Smoker BMI and BSA Data Body Mass Index Body Surface Area  
 29.87 kg/m 2 1.84 m 2 Preferred Pharmacy Pharmacy Name Phone [facility] [address] [facility], [address] [place] [address] [address] Your Updated Medication List  
  
   
This list is accurate as of 18 10:51 AM.  Always use your most recent med list.  
  
  
  
  
 aspirin 81 mg chewable tablet Take 81 mg by mouth daily. atorvastatin 20 mg tablet Commonly known as:  LIPITOR Take 1 Tab by mouth daily. benztropine 0.5 mg tablet Commonly known as:  COGENTIN Take 1 Tab by mouth two (2) times a day. Indications: drug-induced extrapyramidal reaction  
  
 busPIRone 30 mg tablet Commonly known as:  BUSPAR Take 60 mg by mouth two (2) times a day. fluticasone 50 mcg/actuation nasal spray Commonly known as:  FLONASE  
TAke 2 sprays each nostril once a day * GEODON 20 mg capsule Generic drug:  ziprasidone Take 80 mg by mouth daily (with breakfast). * ziprasidone hcl 60 mg capsule Commonly known as:  Rishi Ld Take 60 mg by mouth daily. * lamoTRIgine 100 mg tablet Commonly known as: LaMICtal  
Take 300 mg by mouth daily. * lamoTRIgine 200 mg tablet Commonly known as: LaMICtal  
take 1 tablet by mouth once daily TAKE WITH 100 milligram tablet  
  
 levocetirizine 5 mg tablet Commonly known as:  Nereida Ala Take 1 Tab by mouth daily. * levothyroxine 100 mcg tablet Commonly known as:  SYNTHROID Take 1 Tab by mouth Daily (before breakfast). * levothyroxine 88 mcg tablet Commonly known as:  SYNTHROID Take 1 Tab by mouth Daily (before breakfast). losartan 25 mg tablet Commonly known as:  COZAAR  
take 1 tablet by mouth once daily  
  
 rizatriptan 10 mg tablet Commonly known as:  Nolene Caro Take 1 Tab by mouth once as needed for Migraine for up to 1 dose. May repeat in 2 hours if needed  
  
 triamcinolone acetonide 0.1 % dental paste Commonly known as:  KENALOG  
apply to affected area three times a day VIIBRYD 40 mg Tab tablet Generic drug:  vilazodone Take 40 mg by mouth daily. zolpidem CR 12.5 mg tablet Commonly known as:  AMBIEN CR  
  
 * Notice: This list has 6 medication(s) that are the same as other medications prescribed for you. Read the directions carefully, and ask your doctor or other care provider to review them with you. Prescriptions Sent to Pharmacy Refills  
 benztropine (COGENTIN) 0.5 mg tablet 5 Sig: Take 1 Tab by mouth two (2) times a day. Indications: drug-induced extrapyramidal reaction Class: Normal  
 Pharmacy: [id number]  #: [phone] Route: Oral  
  
Follow-up Instructions Return in about 6 months (around 1/5/2019) for memory loss, tremors. Patient Instructions A Healthy Lifestyle: Care Instructions Your Care Instructions A healthy lifestyle can help you feel good, stay at a healthy weight, and have plenty of energy for both work and play. A healthy lifestyle is something you can share with your whole family. A healthy lifestyle also can lower your risk for serious health problems, such as high blood pressure, heart disease, and diabetes. You can follow a few steps listed below to improve your health and the health of your family. Follow-up care is a key part of your treatment and safety. Be sure to make and go to all appointments, and call your doctor if you are having problems. It's also a good idea to know your test results and keep a list of the medicines you take. How can you care for yourself at home? · Do not eat too much sugar, fat, or fast foods. You can still have dessert and treats now and then. The goal is moderation. · Start small to improve your eating habits. Pay attention to portion sizes, drink less juice and soda pop, and eat more fruits and vegetables. ¨ Eat a healthy amount of food. A 3-ounce serving of meat, for example, is about the size of a deck of cards. Fill the rest of your plate with vegetables and whole grains. ¨ Limit the amount of soda and sports drinks you have every day. Drink more water when you are thirsty. ¨ Eat at least 5 servings of fruits and vegetables every day.  It may seem like a lot, but it is not hard to reach this goal. A serving or helping is 1 piece of fruit, 1 cup of vegetables, or 2 cups of leafy, raw vegetables. Have an apple or some carrot sticks as an afternoon snack instead of a candy bar. Try to have fruits and/or vegetables at every meal. 
· Make exercise part of your daily routine. You may want to start with simple activities, such as walking, bicycling, or slow swimming. Try to be active 30 to 60 minutes every day. You do not need to do all 30 to 60 minutes all at once. For example, you can exercise 3 times a day for 10 or 20 minutes. Moderate exercise is safe for most people, but it is always a good idea to talk to your doctor before starting an exercise program. 
· Keep moving. [employer] the lawn, work in the garden, or [employer]. Take the stairs instead of the elevator at work. · If you smoke, quit. People who smoke have an increased risk for heart attack, stroke, cancer, and other lung illnesses. Quitting is hard, but there are ways to boost your chance of quitting tobacco for good. ¨ Use nicotine gum, patches, or lozenges. ¨ Ask your doctor about stop-smoking programs and medicines. ¨ Keep trying. In addition to reducing your risk of diseases in the future, you will notice some benefits soon after you stop using tobacco. If you have shortness of breath or asthma symptoms, they will likely get better within a few weeks after you quit. · Limit how much alcohol you drink. Moderate amounts of alcohol (up to 2 drinks a day for men, 1 drink a day for women) are okay. But drinking too much can lead to liver problems, high blood pressure, and other health problems. Family health If you have a family, there are many things you can do together to improve your health. · Eat meals together as a family as often as possible. · Eat healthy foods. This includes fruits, vegetables, lean meats and dairy, and whole grains. · Include your family in your fitness plan.  Most people think of activities such as jogging or tennis as the way to fitness, but there are many ways you and your family can be more active. Anything that makes you breathe hard and gets your heart pumping is exercise. Here are some tips: 
¨ Walk to do errands or to take your child to school or the bus. ¨ Go for a family bike ride after dinner instead of watching TV. Where can you learn more? Go to http://alivia-maritza.info/. Enter O913 in the search box to learn more about \"A Healthy Lifestyle: Care Instructions. \" Current as of: May 12, 2017 Content Version: 11.4 © 5482-0130 [employer]. Care instructions adapted under license by "[employer] (which disclaims liability or warranty for this information). If you have questions about a medical condition or this instruction, always ask your healthcare professional. [address] any warranty or liability for your use of this information. Please be advised there is a $25 fee for all paperwork to be completed from our  providers. This is to be paid by the patient prior to picking up the completed forms. Introducing [facility] & HEALTH SERVICES! Dear Ravi Mesa: Thank you for requesting a Etelos account. Our records indicate that you already have an active Etelos account. You can access your account anytime at [email or web address]. [employer] Did you know that you can access your hospital and ER discharge instructions at any time in Etelos? You can also review all of your test results from your hospital stay or ER visit. Additional Information If you have questions, please visit the Frequently Asked Questions section of the Etelos website at [email or web address] Remember, Etelos is NOT to be used for urgent needs. For medical emergencies, dial 911. Now available from your iPhone and Android! Please provide this summary of care documentation to your next provider. Your primary care clinician is listed as Ella Dorsey. If you have any questions after today's visit, please call [phone].

## 2018-09-24 ENCOUNTER — CLINICAL SUPPORT (OUTPATIENT)
Dept: INTERNAL MEDICINE CLINIC | Age: 53
End: 2018-09-24

## 2018-09-24 VITALS
HEART RATE: 66 BPM | OXYGEN SATURATION: 98 % | DIASTOLIC BLOOD PRESSURE: 63 MMHG | RESPIRATION RATE: 16 BRPM | SYSTOLIC BLOOD PRESSURE: 134 MMHG

## 2018-09-24 DIAGNOSIS — E03.9 ACQUIRED HYPOTHYROIDISM: Primary | ICD-10-CM

## 2018-09-24 NOTE — PROGRESS NOTES
Patient in for labs only to recheck TSH level only - levels are so up and down she checks every 3 months to monitor thyroid levels  Raffy Elena LPN  4/06/6637  6:79 PM

## 2018-09-25 LAB — TSH SERPL DL<=0.005 MIU/L-ACNC: 0.37 UIU/ML (ref 0.45–4.5)

## 2018-09-28 DIAGNOSIS — G43.909 MIGRAINE WITHOUT STATUS MIGRAINOSUS, NOT INTRACTABLE, UNSPECIFIED MIGRAINE TYPE: ICD-10-CM

## 2018-09-28 RX ORDER — RIZATRIPTAN BENZOATE 10 MG/1
10 TABLET ORAL
Qty: 9 TAB | Refills: 1 | Status: SHIPPED | OUTPATIENT
Start: 2018-09-28 | End: 2019-02-15 | Stop reason: SDUPTHER

## 2018-09-28 NOTE — TELEPHONE ENCOUNTER
Requested Prescriptions     Pending Prescriptions Disp Refills    rizatriptan (MAXALT) 10 mg tablet 9 Tab 0     Sig: Take 1 Tab by mouth once as needed for Migraine for up to 1 dose.  May repeat in 2 hours if needed

## 2018-09-28 NOTE — TELEPHONE ENCOUNTER
Requested Prescriptions     Pending Prescriptions Disp Refills    rizatriptan (MAXALT) 10 mg tablet 9 Tab 0     Sig: Take 1 Tab by mouth once as needed for Migraine for up to 1 dose.  May repeat in 2 hours if needed     Future Appointments:  None scheduled   Last Appointment With Me:  05.22.2018  Last Filled:  05.22.2018  Changes Made to Medication on Last Visit:  None

## 2018-10-01 ENCOUNTER — TELEPHONE (OUTPATIENT)
Dept: INTERNAL MEDICINE CLINIC | Age: 53
End: 2018-10-01

## 2018-10-02 NOTE — TELEPHONE ENCOUNTER
Patient states that in September 2018 the FDA posted online a recall of Losartan due to possible cancer causing agents in this medication - advised that our office has not been aware of a recall on Losartan - but it was to Brittany Barrera - spoke with the pharmacist at [address] [place] [address] in [place], he agrees that there has been no documentation of a Losartan recall - asked if the patient could send a copy of the information she found to us - Daily Curry LPN  27/2/0605  6:97 AM

## 2018-10-02 NOTE — TELEPHONE ENCOUNTER
Verified on the FDA website that there is NO recall of Losartan - advised patient that medication is safe to take - patient was very appreciative of the information and will continue to take it as prescribed  Quinton Sosa LPN  17/6/4928  8:63 AM

## 2018-11-06 ENCOUNTER — TELEPHONE (OUTPATIENT)
Dept: INTERNAL MEDICINE CLINIC | Age: 53
End: 2018-11-06

## 2018-11-06 NOTE — TELEPHONE ENCOUNTER
Pt called saying her psychiatrist put her on a different medication and it is for blood pressure along with the medication that she is already on. She wants someone to give her a call to let her know what she should take and what she no longer need to take.

## 2018-11-29 DIAGNOSIS — R09.81 NASAL SINUS CONGESTION: ICD-10-CM

## 2018-11-29 NOTE — TELEPHONE ENCOUNTER
Requested Prescriptions     Pending Prescriptions Disp Refills    levocetirizine (XYZAL) 5 mg tablet 90 Tab 1     Sig: Take 1 Tab by mouth daily.

## 2018-12-03 DIAGNOSIS — E03.9 ACQUIRED HYPOTHYROIDISM: ICD-10-CM

## 2018-12-03 RX ORDER — LEVOTHYROXINE SODIUM 100 UG/1
100 TABLET ORAL
Qty: 30 TAB | Refills: 5 | Status: SHIPPED | OUTPATIENT
Start: 2018-12-03 | End: 2018-12-06 | Stop reason: SDUPTHER

## 2018-12-03 NOTE — TELEPHONE ENCOUNTER
Last office visit:  5/22/18 Ivelisse Reilly  Last filled:  Levothyroxine 100mcg 1 daily acb #30 5/22/18 x 5 refills  No changes  No follow up scheduled

## 2018-12-03 NOTE — TELEPHONE ENCOUNTER
Requested Prescriptions     Pending Prescriptions Disp Refills    levothyroxine (SYNTHROID) 100 mcg tablet 30 Tab 5     Sig: Take 1 Tab by mouth Daily (before breakfast).

## 2018-12-04 RX ORDER — LEVOCETIRIZINE DIHYDROCHLORIDE 5 MG/1
5 TABLET, FILM COATED ORAL DAILY
Qty: 90 TAB | Refills: 1 | Status: SHIPPED | OUTPATIENT
Start: 2018-12-04 | End: 2018-12-18 | Stop reason: ALTCHOICE

## 2018-12-04 NOTE — TELEPHONE ENCOUNTER
Last office visit:  5/22/18  Last filled:  Xyzal 5mg 1 tab daily #90 X 1 refill 6/14/18  No changes    No follow up scheduled

## 2018-12-06 ENCOUNTER — TELEPHONE (OUTPATIENT)
Dept: INTERNAL MEDICINE CLINIC | Age: 53
End: 2018-12-06

## 2018-12-06 DIAGNOSIS — E03.9 ACQUIRED HYPOTHYROIDISM: ICD-10-CM

## 2018-12-06 RX ORDER — LEVOTHYROXINE SODIUM 100 UG/1
100 TABLET ORAL
Qty: 30 TAB | Refills: 2 | Status: SHIPPED | OUTPATIENT
Start: 2018-12-06 | End: 2018-12-18

## 2018-12-06 NOTE — TELEPHONE ENCOUNTER
Sent request to Dr Pierre  to [id number] [address] some at [facility]  Giovani Victoria LPN  71/3/1879  9:62 PM

## 2018-12-06 NOTE — TELEPHONE ENCOUNTER
Last office visit:  5/22/18 (dennison)  Last filled:  Levothyroxine 100mcg 1 daily 12/3/18 #30 X 5 refills to [facility] mail order  Wants a temporary script sent to [facility] because she is out of this medication  No follow up scheduled  Last TSH checked in 9/2018

## 2018-12-06 NOTE — TELEPHONE ENCOUNTER
----- Message from Isaura Danielle sent at 12/5/2018  4:01 PM EST -----  Regarding: Dr. Polanco Flavin: [phone]  Pt requesting a return call concerning a Rx Levothyroxine 100 mcg. [address] at [phone], sent over a request for approval and did not receive a response in over a week.

## 2018-12-06 NOTE — TELEPHONE ENCOUNTER
Pt called said that her blood pressure is on recall can she please get a new medication called into [facility] in [facility]

## 2018-12-06 NOTE — TELEPHONE ENCOUNTER
Pt called in reference to wanting to know if at least a weeks supply of [facility]othyroxine [facility] [place] [facility] her refill comes in [facility]. She is completely out. Please call her at [phone].

## 2018-12-07 NOTE — TELEPHONE ENCOUNTER
----- Message from Pradip Piña sent at 12/7/2018  2:09 PM EST -----  Regarding: Dr. Petty Gracai: [phone]  Pt is requesting her medications both be resent to the [facility] in [place], [address] \"synthroid\" and a new blood pressure medication.

## 2018-12-07 NOTE — TELEPHONE ENCOUNTER
Called pt and confirmed Losartan 25mg dose every day and pt also needs 10-15  Tablets of Levothyroxine 100mcq sent to [facility] because this RX was [facility] and she has not received them.    Thank you

## 2018-12-08 RX ORDER — LOSARTAN POTASSIUM 25 MG/1
TABLET ORAL
Qty: 90 TAB | Refills: 0 | Status: SHIPPED | OUTPATIENT
Start: 2018-12-08 | End: 2018-12-11 | Stop reason: RX

## 2018-12-11 ENCOUNTER — TELEPHONE (OUTPATIENT)
Dept: INTERNAL MEDICINE CLINIC | Age: 53
End: 2018-12-11

## 2018-12-11 RX ORDER — OLMESARTAN MEDOXOMIL 5 MG/1
5 TABLET ORAL DAILY
Qty: 30 TAB | Refills: 5 | Status: SHIPPED | OUTPATIENT
Start: 2018-12-11 | End: 2018-12-19 | Stop reason: SINTOL

## 2018-12-11 NOTE — TELEPHONE ENCOUNTER
Notified patient that [address] has sent a replacement medication for Losartan called Benicar (Olmesartan) to [facility] in [place], [place]  16/82/0916  3:91 PM

## 2018-12-11 NOTE — TELEPHONE ENCOUNTER
Pt called again about her replacement medication and wanted someone to give her a call. Please contact pt so thast she can know if it can be replaced or not.  [phone]

## 2018-12-11 NOTE — TELEPHONE ENCOUNTER
Patient states that the Losartan has been recalled so she wants a replacement medication sent to [facility] in [place], [place]  94/00/4497  64:39 PM

## 2018-12-17 ENCOUNTER — TELEPHONE (OUTPATIENT)
Dept: INTERNAL MEDICINE CLINIC | Age: 53
End: 2018-12-17

## 2018-12-17 NOTE — TELEPHONE ENCOUNTER
Pt called in reference to the benicar that Dr. Ever Coto has changed her medication to. She said it is working well but her pulse is 44. Please call her at [phone].

## 2018-12-17 NOTE — TELEPHONE ENCOUNTER
Called patient - she states that she saw her psychiatrist this AM and her heart rate was 52 - has been checking it at home once a day in the mornings and its been in the 40s for her on several occasions as well - denies dizziness, denies lightheadedness - no extraordinary fatigue or SOB - checked blood pressure and heart rate while I was on the phone with her which is now about an hour after getting home and it read 138/72, heart rate 64 - advised that these were good numbers - but advised that she should keep a written log of day and time of day that she checks her blood pressure and what the readings are - since she is asymptomatic will check back with her in a week to see what her blood pressure readings are doing - will send Dr Mcdonough List message to make him aware as well - has been taking Benicar 5mg every day for about a week   Kary Edwards LPN  19/63/5931  4:66 PM

## 2018-12-18 ENCOUNTER — OFFICE VISIT (OUTPATIENT)
Dept: INTERNAL MEDICINE CLINIC | Age: 53
End: 2018-12-18

## 2018-12-18 VITALS
TEMPERATURE: 98.3 F | HEART RATE: 51 BPM | WEIGHT: 173 LBS | OXYGEN SATURATION: 96 % | HEIGHT: 63 IN | DIASTOLIC BLOOD PRESSURE: 83 MMHG | BODY MASS INDEX: 30.65 KG/M2 | SYSTOLIC BLOOD PRESSURE: 141 MMHG | RESPIRATION RATE: 16 BRPM

## 2018-12-18 DIAGNOSIS — F41.9 ANXIETY: Primary | ICD-10-CM

## 2018-12-18 DIAGNOSIS — E03.9 ACQUIRED HYPOTHYROIDISM: ICD-10-CM

## 2018-12-18 DIAGNOSIS — I10 ESSENTIAL HYPERTENSION: ICD-10-CM

## 2018-12-18 RX ORDER — PROPRANOLOL HYDROCHLORIDE 40 MG/1
40 TABLET ORAL
Qty: 30 TAB | Refills: 5 | Status: SHIPPED | OUTPATIENT
Start: 2018-12-18 | End: 2019-01-14 | Stop reason: SDUPTHER

## 2018-12-18 RX ORDER — PROPRANOLOL HYDROCHLORIDE 40 MG/1
40 TABLET ORAL 2 TIMES DAILY
COMMUNITY
End: 2018-12-18

## 2018-12-18 RX ORDER — OXCARBAZEPINE 600 MG/1
600 TABLET, FILM COATED ORAL 2 TIMES DAILY
COMMUNITY
End: 2018-12-20

## 2018-12-18 RX ORDER — LEVOTHYROXINE SODIUM 88 UG/1
88 TABLET ORAL
Qty: 30 TAB | Refills: 5 | Status: SHIPPED | OUTPATIENT
Start: 2018-12-18 | End: 2019-01-14 | Stop reason: SDUPTHER

## 2018-12-18 NOTE — PROGRESS NOTES
PROGRESS NOTE        SUBJECTIVE:  Diagnosis/Chief Complaint: Slow Heart Rate (pt's HR has been upper 40's and was dizzy today)      Doing well with mood no  Symptoms very continues anxiety, her psychiatrist put her on a beta-blocker propranolol. She is Scratch of her pulse and it is pretty low. Agree with comments, see chief complaint. Suicidal: no  Side affects: yes -little dizzy  States taking medications per medicine list.yes -beta blockers twice daily. Patient Active Problem List    Diagnosis Date Noted    Tremor 05/31/2018    Essential hypertension 02/02/2018    High cholesterol 02/02/2018    Acquired hypothyroidism 12/22/2017       Allergies   Allergen Reactions    Benicar [Olmesartan] Itching    Penicillins Rash     Social History     Tobacco Use    Smoking status: Never Smoker    Smokeless tobacco: Never Used   Substance Use Topics    Alcohol use: No        OBJECTIVE:    .  Visit Vitals  /83 (BP 1 Location: Left arm, BP Patient Position: Sitting)   Pulse (!) 51   Temp 98.3 °F (36.8 °C) (Oral)   Resp 16   Ht 5' 3\" (1.6 m)   Wt 173 lb (78.5 kg)   SpO2 96%   BMI 30.65 kg/m²     WDWN in NAD  Heart RRR, no:C/M/R  Lungs CTA No wheezes, rales or rhonchi  Abdo: soft no tenderness, rebound or guarding  Neurological exam[de-identified] 2-12 intact  Psychiatric: Normal mood, judgement    Reviewed: Medications, allergies, clinical lab test results and imaging results have been reviewed. Any abnormal findings have been addressed. ASSESSMENT:       ICD-10-CM ICD-9-CM    1. Anxiety F41.9 300.00 propranolol (INDERAL) 40 mg tablet   2. Acquired hypothyroidism E03.9 244.9 levothyroxine (SYNTHROID) 88 mcg tablet   3. Essential hypertension I10 401.9        PLAN    Orders Placed This Encounter    OXcarbaxepine (TRILEPTAL) 600 mg tablet     Sig: Take 600 mg by mouth two (2) times a day.  DISCONTD: propranolol (INDERAL) 40 mg tablet     Sig: Take 40 mg by mouth two (2) times a day.     levothyroxine (SYNTHROID) 88 mcg tablet     Sig: Take 1 Tab by mouth Daily (before breakfast). Dispense:  30 Tab     Refill:  5    propranolol (INDERAL) 40 mg tablet     Sig: Take 1 Tab by mouth nightly. Dispense:  30 Tab     Refill:  5    amLODIPine (NORVASC) 5 mg tablet     Sig: Take 1 Tab by mouth daily. Dispense:  30 Tab     Refill:  5     Originally prescribed Benicar she is itching with that change it to the above amlodipine. Reduce propranolol may be causing some of the dizziness issues. Anxiety per psychiatry. Hypothyroidism stable. Follow-up Disposition:  Return in about 6 weeks (around 1/29/2019) for routine follow up.

## 2018-12-18 NOTE — PROGRESS NOTES
Chief Complaint   Patient presents with    Slow Heart Rate     pt's HR has been upper 40's and was dizzy today     I have reviewed the patient's medical history in detail and updated the computerized patient record. Health Maintenance reviewed. 1. Have you been to the ER, urgent care clinic since your last visit? Hospitalized since your last visit?no    2. Have you seen or consulted any other health care providers outside of the [address] [place] [address] Ang since your last visit? Include any pap smears or colon screening. No      Encouraged pt to discuss pt's wishes with spouse/partner/family and bring them in the next appt to follow thru with the Advanced Directive    No flowsheet data found. PHQ over the last two weeks 12/18/2018   PHQ Not Done -   Little interest or pleasure in doing things Several days   Feeling down, depressed, irritable, or hopeless Several days   Total Score PHQ 2 2       Abuse Screening Questionnaire 1/12/2018   Do you ever feel afraid of your partner? N   Are you in a relationship with someone who physically or mentally threatens you? N   Is it safe for you to go home?  Y       ADL Assessment 1/12/2018   Feeding yourself No Help Needed   Getting from bed to chair No Help Needed   Getting dressed No Help Needed   Bathing or showering No Help Needed   Walk across the room (includes cane/walker) No Help Needed   Using the telphone No Help Needed   Taking your medications No Help Needed   Preparing meals No Help Needed   Managing money (expenses/bills) No Help Needed   Moderately strenuous housework (laundry) No Help Needed   Shopping for personal items (toiletries/medicines) No Help Needed   Shopping for groceries No Help Needed   Driving No Help Needed   Climbing a flight of stairs No Help Needed   Getting to places beyond walking distances No Help Needed

## 2018-12-19 ENCOUNTER — TELEPHONE (OUTPATIENT)
Dept: INTERNAL MEDICINE CLINIC | Age: 53
End: 2018-12-19

## 2018-12-19 RX ORDER — AMLODIPINE BESYLATE 5 MG/1
5 TABLET ORAL DAILY
Qty: 30 TAB | Refills: 5 | Status: SHIPPED | OUTPATIENT
Start: 2018-12-19 | End: 2019-02-15 | Stop reason: SDUPTHER

## 2018-12-19 NOTE — TELEPHONE ENCOUNTER
Patient calls c/o she itching all over after being on the Benicar for about a week - this is the only new medication she has taken so she is relating it to it this - she has already taken some Benadryl and patient has been advised to stop taking the Benicar until she hears from Dr Александр Best, WENDY  86/21/8617  2:85 PM

## 2018-12-20 ENCOUNTER — TELEPHONE (OUTPATIENT)
Dept: INTERNAL MEDICINE CLINIC | Age: 53
End: 2018-12-20

## 2018-12-20 PROBLEM — F31.9 BIPOLAR I DISORDER WITH ANXIOUS DISTRESS (HCC): Status: ACTIVE | Noted: 2018-12-20

## 2018-12-20 PROBLEM — F43.10 PTSD (POST-TRAUMATIC STRESS DISORDER): Status: ACTIVE | Noted: 2018-12-20

## 2018-12-20 PROBLEM — F41.1 GAD (GENERALIZED ANXIETY DISORDER): Status: ACTIVE | Noted: 2018-12-20

## 2018-12-20 NOTE — TELEPHONE ENCOUNTER
Called and LM on VM - requested a return call to let me know she received the new medication  Nadeem Kaminski LPN  86/72/8449  3:95 AM

## 2018-12-20 NOTE — TELEPHONE ENCOUNTER
----- Message from [employer] [address] sent at 12/20/2018  8:51 AM EST -----  Regarding: Dr. Macey Greenberg  Pt called to report that she got the message regarding the BP medication last night.  Best contact number is [phone]

## 2019-01-08 ENCOUNTER — CLINICAL SUPPORT (OUTPATIENT)
Dept: INTERNAL MEDICINE CLINIC | Age: 54
End: 2019-01-08

## 2019-01-08 DIAGNOSIS — E03.9 ACQUIRED HYPOTHYROIDISM: Primary | ICD-10-CM

## 2019-01-11 RX ORDER — LEVOTHYROXINE SODIUM 100 UG/1
100 TABLET ORAL
Qty: 30 TAB | Refills: 1 | OUTPATIENT
Start: 2019-01-11

## 2019-01-14 ENCOUNTER — OFFICE VISIT (OUTPATIENT)
Dept: INTERNAL MEDICINE CLINIC | Age: 54
End: 2019-01-14

## 2019-01-14 VITALS
OXYGEN SATURATION: 96 % | WEIGHT: 162.4 LBS | SYSTOLIC BLOOD PRESSURE: 114 MMHG | HEART RATE: 56 BPM | BODY MASS INDEX: 28.77 KG/M2 | DIASTOLIC BLOOD PRESSURE: 70 MMHG | HEIGHT: 63 IN | TEMPERATURE: 98.3 F | RESPIRATION RATE: 18 BRPM

## 2019-01-14 DIAGNOSIS — E03.9 ACQUIRED HYPOTHYROIDISM: ICD-10-CM

## 2019-01-14 DIAGNOSIS — S69.91XS: ICD-10-CM

## 2019-01-14 DIAGNOSIS — I10 ESSENTIAL HYPERTENSION: Primary | ICD-10-CM

## 2019-01-14 DIAGNOSIS — Z13.220 SCREENING CHOLESTEROL LEVEL: ICD-10-CM

## 2019-01-14 DIAGNOSIS — F41.9 ANXIETY: ICD-10-CM

## 2019-01-14 RX ORDER — PROPRANOLOL HYDROCHLORIDE 40 MG/1
40 TABLET ORAL
Qty: 30 TAB | Refills: 5 | Status: SHIPPED | OUTPATIENT
Start: 2019-01-14 | End: 2019-04-18

## 2019-01-14 RX ORDER — LEVOTHYROXINE SODIUM 88 UG/1
88 TABLET ORAL
Qty: 30 TAB | Refills: 5 | Status: SHIPPED | OUTPATIENT
Start: 2019-01-14 | End: 2019-02-15 | Stop reason: SDUPTHER

## 2019-01-14 NOTE — PROGRESS NOTES
Chief Complaint   Patient presents with    Thyroid Problem     1. Have you been to the ER, urgent care clinic since your last visit? Hospitalized since your last visit? No    2. Have you seen or consulted any other health care providers outside of the [address] [place] [address] since your last visit? Include any pap smears or colon screening. Yes Dr. Pritchett Citilouann for [facility].      Health Maintenance Due   Topic Date Due    Shingrix Vaccine Age 50> (2 of 2) 11/27/2018    FOBT Q 1 YEAR AGE 50-75  02/22/2019

## 2019-01-14 NOTE — PROGRESS NOTES
Subjective:     Sheila Marks is a 46 y.o. female who presents for follow up of hypertension and thyroid. New concerns: quick movement of both eyes, then goes away x 2 weeks now. Sees psych, adjusting meds. Sterted new med but had to stop it. ANASTASIA acting up some, interested in getting off some of her meds. Right wrist pain since dog pulled on her. Went to  wearing a brace but still hurts, 2 weeks    Current Outpatient Medications   Medication Sig Dispense Refill    amLODIPine (NORVASC) 5 mg tablet Take 1 Tab by mouth daily. 30 Tab 5    levothyroxine (SYNTHROID) 88 mcg tablet Take 1 Tab by mouth Daily (before breakfast). 30 Tab 5    propranolol (INDERAL) 40 mg tablet Take 1 Tab by mouth nightly. 30 Tab 5    aspirin 81 mg chewable tablet Take 81 mg by mouth daily.  zolpidem CR (AMBIEN CR) 12.5 mg tablet       ziprasidone hcl (GEODON) 60 mg capsule Take 60 mg by mouth daily.  triamcinolone acetonide (KENALOG) 0.1 % dental paste apply to affected area three times a day  0    lamoTRIgine (LAMICTAL) 200 mg tablet take 1 tablet by mouth once daily TAKE WITH 100 milligram tablet  0    lamoTRIgine (LAMICTAL) 100 mg tablet Take 300 mg by mouth daily.  busPIRone (BUSPAR) 30 mg tablet Take 15 mg by mouth two (2) times a day.  vilazodone (VIIBRYD) 40 mg tab tablet Take 40 mg by mouth daily. Allergies   Allergen Reactions    Benicar [Olmesartan] Itching    Penicillins Rash       Diet and Lifestyle: nonsmoker    Cardiovascular ROS: taking medications as instructed, side effects noted by patient include ? Eye movments, no TIA's, no chest pain on exertion, no dyspnea on exertion, no swelling of ankles. Review of Systems, additional:  Pertinent items are noted in HPI.       Patient Active Problem List    Diagnosis Date Noted    Bipolar I disorder with anxious distress ([facility] [address] 12/20/2018    ANASTASIA (generalized anxiety disorder) 12/20/2018    PTSD (post-traumatic stress disorder) 12/20/2018  Tremor 05/31/2018    Essential hypertension 02/02/2018    High cholesterol 02/02/2018    Acquired hypothyroidism 12/22/2017     Social History     Tobacco Use    Smoking status: Never Smoker    Smokeless tobacco: Never Used   Substance Use Topics    Alcohol use: No                 Objective:     Physical exam significant for the following:     PERRL EOMI, hint of exopthalmos    Visit Vitals  /62 (BP 1 Location: Left arm, BP Patient Position: Sitting)   Pulse (!) 56   Temp 98.3 °F (36.8 °C) (Oral)   Resp 18   Ht 5' 3\" (1.6 m)   Wt 162 lb 6.4 oz (73.7 kg)   SpO2 96%   BMI 28.77 kg/m²     Appearance: alert, well appearing, and in no distress. General exam: CVS exam BP noted to be well controlled today in office, S1, S2 normal, no gallop, no murmur, chest clear, no JVD, no HSM, no edema. . Right foreatm in brace. Assessment/Plan:     hypertension stable. Maybe able to red or stop one of her BP meds. Wait till psych finishes adjusting meds. No change for now. Labs per her request.  Eyes ? Med side affect discuss with psych. Chronic Conditions Addressed Today     1. Acquired hypothyroidism     Relevant Medications     levothyroxine (SYNTHROID) 88 mcg tablet     Other Relevant Orders     TSH 3RD GENERATION     MN HANDLG&/OR CONVEY OF SPEC FOR TR OFFICE TO LAB     COLLECTION VENOUS BLOOD,VENIPUNCTURE    2.  Essential hypertension - Primary     Relevant Medications     propranolol (INDERAL) 40 mg tablet     Other Relevant Orders     CBC W/O DIFF     METABOLIC PANEL, COMPREHENSIVE     MN HANDLG&/OR CONVEY OF SPEC FOR TR OFFICE TO LAB     COLLECTION VENOUS BLOOD,VENIPUNCTURE      Acute Diagnoses Addressed Today     Anxiety            Relevant Medications        propranolol (INDERAL) 40 mg tablet    Injury of collateral ligament of wrist, right, sequela            Relevant Orders        REFERRAL TO ORTHOPEDICS    Screening cholesterol level            Relevant Orders        LIPID PANEL        MN HANDLG&/OR CONVEY OF SPEC FOR TR OFFICE TO LAB        COLLECTION VENOUS BLOOD,VENIPUNCTURE        Orders Placed This Encounter    TSH 3RD GENERATION    CBC W/O DIFF    METABOLIC PANEL, COMPREHENSIVE    LIPID PANEL    REFERRAL TO ORTHOPEDICS     Referral Priority:   Routine     Referral Type:   Consultation     Referral Reason:   Specialty Services Required     Referred to Provider:   MD Sherly Mandujano HANDLG&/OR CONVEY OF SPEC FOR TR OFFICE TO LAB    COLLECTION VENOUS BLOOD,VENIPUNCTURE    levothyroxine (SYNTHROID) 88 mcg tablet     Sig: Take 1 Tab by mouth Daily (before breakfast). Dispense:  30 Tab     Refill:  5    propranolol (INDERAL) 40 mg tablet     Sig: Take 1 Tab by mouth nightly. Dispense:  30 Tab     Refill:  5     Follow-up Disposition:  Return in about 4 weeks (around 2/11/2019) for routine follow up.

## 2019-01-15 ENCOUNTER — DOCUMENTATION ONLY (OUTPATIENT)
Dept: INTERNAL MEDICINE CLINIC | Age: 54
End: 2019-01-15

## 2019-01-15 LAB
ALBUMIN SERPL-MCNC: 5.3 G/DL (ref 3.5–5.5)
ALBUMIN/GLOB SERPL: 2.3 {RATIO} (ref 1.2–2.2)
ALP SERPL-CCNC: 80 IU/L (ref 39–117)
ALT SERPL-CCNC: 13 IU/L (ref 0–32)
AST SERPL-CCNC: 21 IU/L (ref 0–40)
BILIRUB SERPL-MCNC: 0.4 MG/DL (ref 0–1.2)
BUN SERPL-MCNC: 13 MG/DL (ref 6–24)
BUN/CREAT SERPL: 14 (ref 9–23)
CALCIUM SERPL-MCNC: 10.3 MG/DL (ref 8.7–10.2)
CHLORIDE SERPL-SCNC: 93 MMOL/L (ref 96–106)
CHOLEST SERPL-MCNC: 338 MG/DL (ref 100–199)
CO2 SERPL-SCNC: 23 MMOL/L (ref 20–29)
COMMENT, 011824: ABNORMAL
CREAT SERPL-MCNC: 0.96 MG/DL (ref 0.57–1)
ERYTHROCYTE [DISTWIDTH] IN BLOOD BY AUTOMATED COUNT: 13.6 % (ref 12.3–15.4)
GLOBULIN SER CALC-MCNC: 2.3 G/DL (ref 1.5–4.5)
GLUCOSE SERPL-MCNC: 96 MG/DL (ref 65–99)
HCT VFR BLD AUTO: 36.7 % (ref 34–46.6)
HDLC SERPL-MCNC: 78 MG/DL
HGB BLD-MCNC: 12.8 G/DL (ref 11.1–15.9)
INTERPRETATION, 910389: NORMAL
LDLC SERPL CALC-MCNC: 244 MG/DL (ref 0–99)
MCH RBC QN AUTO: 30 PG (ref 26.6–33)
MCHC RBC AUTO-ENTMCNC: 34.9 G/DL (ref 31.5–35.7)
MCV RBC AUTO: 86 FL (ref 79–97)
PLATELET # BLD AUTO: 389 X10E3/UL (ref 150–379)
POTASSIUM SERPL-SCNC: 4.7 MMOL/L (ref 3.5–5.2)
PROT SERPL-MCNC: 7.6 G/DL (ref 6–8.5)
RBC # BLD AUTO: 4.26 X10E6/UL (ref 3.77–5.28)
SODIUM SERPL-SCNC: 136 MMOL/L (ref 134–144)
TRIGL SERPL-MCNC: 80 MG/DL (ref 0–149)
TSH SERPL DL<=0.005 MIU/L-ACNC: 3.96 UIU/ML (ref 0.45–4.5)
VLDLC SERPL CALC-MCNC: 16 MG/DL (ref 5–40)
WBC # BLD AUTO: 5.8 X10E3/UL (ref 3.4–10.8)

## 2019-01-17 ENCOUNTER — PATIENT MESSAGE (OUTPATIENT)
Dept: INTERNAL MEDICINE CLINIC | Age: 54
End: 2019-01-17

## 2019-01-18 RX ORDER — ATORVASTATIN CALCIUM 40 MG/1
40 TABLET, FILM COATED ORAL DAILY
Qty: 30 TAB | Refills: 5 | Status: SHIPPED | OUTPATIENT
Start: 2019-01-18 | End: 2019-02-15 | Stop reason: SDUPTHER

## 2019-01-18 NOTE — PROGRESS NOTES
Labs normal except cholesterol high. Take atorvastatin which has been called in, repeat in a few months. Follow low cholesterol diet.

## 2019-01-24 ENCOUNTER — TELEPHONE (OUTPATIENT)
Dept: INTERNAL MEDICINE CLINIC | Age: 54
End: 2019-01-24

## 2019-01-24 NOTE — TELEPHONE ENCOUNTER
Faxed records as requested - last EKG 2/2018  [facility] Kemal, LPN  1/03/7311  94:90 AM

## 2019-01-24 NOTE — TELEPHONE ENCOUNTER
Michael Ulloa, from Dr. Soto [facility] office called in reference to needing patient most office notes, labs, and ekg. Please fax to [phone] with attention Tawny/Soniya.

## 2019-02-06 RX ORDER — BENZTROPINE MESYLATE 0.5 MG/1
TABLET ORAL
Qty: 180 TAB | Refills: 0 | Status: SHIPPED | OUTPATIENT
Start: 2019-02-06 | End: 2019-04-18

## 2019-02-13 RX ORDER — LOSARTAN POTASSIUM 25 MG/1
TABLET ORAL
Qty: 90 TAB | Refills: 1 | Status: SHIPPED | OUTPATIENT
Start: 2019-02-13 | End: 2019-04-18 | Stop reason: ALTCHOICE

## 2019-02-15 DIAGNOSIS — G43.909 MIGRAINE WITHOUT STATUS MIGRAINOSUS, NOT INTRACTABLE, UNSPECIFIED MIGRAINE TYPE: ICD-10-CM

## 2019-02-15 DIAGNOSIS — E03.9 ACQUIRED HYPOTHYROIDISM: ICD-10-CM

## 2019-02-15 NOTE — TELEPHONE ENCOUNTER
Requested Prescriptions     Pending Prescriptions Disp Refills    amLODIPine (NORVASC) 5 mg tablet 30 Tab 5     Sig: Take 1 Tab by mouth daily.  levothyroxine (SYNTHROID) 88 mcg tablet 30 Tab 5     Sig: Take 1 Tab by mouth Daily (before breakfast).  atorvastatin (LIPITOR) 40 mg tablet 30 Tab 5     Sig: Take 1 Tab by mouth daily.  rizatriptan (MAXALT) 10 mg tablet 9 Tab 1     Sig: Take 1 Tab by mouth once as needed for Migraine for up to 1 dose.  May repeat in 2 hours if needed

## 2019-02-15 NOTE — TELEPHONE ENCOUNTER
Requested Prescriptions     Pending Prescriptions Disp Refills    amLODIPine (NORVASC) 5 mg tablet 30 Tab 5     Sig: Take 1 Tab by mouth daily.  levothyroxine (SYNTHROID) 88 mcg tablet 30 Tab 5     Sig: Take 1 Tab by mouth Daily (before breakfast).  atorvastatin (LIPITOR) 40 mg tablet 90 Tab 1     Sig: Take 1 Tab by mouth daily.  rizatriptan (MAXALT) 10 mg tablet 9 Tab 1     Sig: Take 1 Tab by mouth once as needed for Migraine for up to 1 dose. May repeat in 2 hours if needed     Last office visit 1/14/19 future 2/22/19  Last filled  12/19/18 - 1/14/19 - 1/18/19 - ?  Not on profile  Changes made to medication on last visit  None    Rizatriptan not on profile

## 2019-02-16 RX ORDER — ATORVASTATIN CALCIUM 40 MG/1
40 TABLET, FILM COATED ORAL DAILY
Qty: 90 TAB | Refills: 1 | Status: SHIPPED | OUTPATIENT
Start: 2019-02-16 | End: 2019-07-31 | Stop reason: SDUPTHER

## 2019-02-16 RX ORDER — AMLODIPINE BESYLATE 5 MG/1
5 TABLET ORAL DAILY
Qty: 90 TAB | Refills: 1 | Status: SHIPPED | OUTPATIENT
Start: 2019-02-16 | End: 2019-07-31 | Stop reason: SDUPTHER

## 2019-02-16 RX ORDER — LEVOTHYROXINE SODIUM 88 UG/1
88 TABLET ORAL
Qty: 30 TAB | Refills: 5 | Status: SHIPPED | OUTPATIENT
Start: 2019-02-16 | End: 2019-04-18 | Stop reason: SDUPTHER

## 2019-02-16 RX ORDER — RIZATRIPTAN BENZOATE 10 MG/1
10 TABLET ORAL
Qty: 9 TAB | Refills: 1 | Status: SHIPPED | OUTPATIENT
Start: 2019-02-16 | End: 2019-02-16

## 2019-03-12 PROBLEM — G47.00 INSOMNIA DISORDER WITH NON-SLEEP DISORDER MENTAL COMORBIDITY: Status: ACTIVE | Noted: 2019-03-12

## 2019-04-18 ENCOUNTER — OFFICE VISIT (OUTPATIENT)
Dept: INTERNAL MEDICINE CLINIC | Age: 54
End: 2019-04-18

## 2019-04-18 VITALS
RESPIRATION RATE: 16 BRPM | BODY MASS INDEX: 28.35 KG/M2 | SYSTOLIC BLOOD PRESSURE: 118 MMHG | HEIGHT: 63 IN | OXYGEN SATURATION: 97 % | HEART RATE: 64 BPM | TEMPERATURE: 98.3 F | WEIGHT: 160 LBS | DIASTOLIC BLOOD PRESSURE: 74 MMHG

## 2019-04-18 DIAGNOSIS — F43.10 PTSD (POST-TRAUMATIC STRESS DISORDER): ICD-10-CM

## 2019-04-18 DIAGNOSIS — Z13.6 SCREENING FOR ISCHEMIC HEART DISEASE: ICD-10-CM

## 2019-04-18 DIAGNOSIS — Z13.1 SCREENING FOR DIABETES MELLITUS: ICD-10-CM

## 2019-04-18 DIAGNOSIS — I10 ESSENTIAL HYPERTENSION: ICD-10-CM

## 2019-04-18 DIAGNOSIS — E03.9 ACQUIRED HYPOTHYROIDISM: ICD-10-CM

## 2019-04-18 DIAGNOSIS — E78.00 HIGH CHOLESTEROL: ICD-10-CM

## 2019-04-18 DIAGNOSIS — R11.0 NAUSEA: ICD-10-CM

## 2019-04-18 DIAGNOSIS — Z11.59 ENCOUNTER FOR HEPATITIS C SCREENING TEST FOR LOW RISK PATIENT: ICD-10-CM

## 2019-04-18 DIAGNOSIS — Z13.39 SCREENING FOR ALCOHOLISM: ICD-10-CM

## 2019-04-18 DIAGNOSIS — Z12.11 SCREENING FOR COLON CANCER: Primary | ICD-10-CM

## 2019-04-18 DIAGNOSIS — F31.9 BIPOLAR I DISORDER WITH ANXIOUS DISTRESS (HCC): ICD-10-CM

## 2019-04-18 DIAGNOSIS — Z00.00 MEDICARE ANNUAL WELLNESS VISIT, SUBSEQUENT: ICD-10-CM

## 2019-04-18 DIAGNOSIS — Z13.31 SCREENING FOR DEPRESSION: ICD-10-CM

## 2019-04-18 RX ORDER — OXCARBAZEPINE 150 MG/1
150 TABLET, FILM COATED ORAL 2 TIMES DAILY
COMMUNITY
End: 2019-04-18

## 2019-04-18 RX ORDER — OXCARBAZEPINE 150 MG/1
150 TABLET, FILM COATED ORAL 2 TIMES DAILY
COMMUNITY
End: 2019-05-09

## 2019-04-18 RX ORDER — LAMOTRIGINE 200 MG/1
TABLET ORAL DAILY
COMMUNITY
End: 2019-05-09 | Stop reason: SDUPTHER

## 2019-04-18 RX ORDER — LAMOTRIGINE 100 MG/1
TABLET ORAL DAILY
COMMUNITY
End: 2019-05-09 | Stop reason: SDUPTHER

## 2019-04-18 NOTE — TELEPHONE ENCOUNTER
Requested Prescriptions     Pending Prescriptions Disp Refills    levothyroxine (SYNTHROID) 88 mcg tablet 30 Tab 5     Sig: Take 1 Tab by mouth Daily (before breakfast).  Indications: hypothyroidism

## 2019-04-18 NOTE — PATIENT INSTRUCTIONS
Medicare Wellness Visit, Female The best way to live healthy is to have a lifestyle where you eat a well-balanced diet, exercise regularly, limit alcohol use, and quit all forms of tobacco/nicotine, if applicable. Regular preventive services are another way to keep healthy. Preventive services (vaccines, screening tests, monitoring & exams) can help personalize your care plan, which helps you manage your own care. Screening tests can find health problems at the earliest stages, when they are easiest to treat. [facility] follows the current, evidence-based guidelines published by the [facility] ([facility]) when recommending preventive services for our patients. Because we follow these guidelines, sometimes recommendations change over time as research supports it. (For example, mammograms used to be recommended annually. Even though Medicare will still pay for an annual mammogram, the newer guidelines recommend a mammogram every two years for women of average risk.) Of course, you and your doctor may decide to screen more often for some diseases, based on your risk and your health status. Preventive services for you include: - Medicare offers their members a free annual wellness visit, which is time for you and your primary care provider to discuss and plan for your preventive service needs. Take advantage of this benefit every year! 
-All adults over the age of 72 should receive the recommended pneumonia vaccines. Current USPSTF guidelines recommend a series of two vaccines for the best pneumonia protection.  
-All adults should have a flu vaccine yearly and a tetanus vaccine every 10 years. All adults age 61 and older should receive a shingles vaccine once in their lifetime.   
-A bone mass density test is recommended when a woman turns 65 to screen for osteoporosis. This test is only recommended one time, as a screening. Some providers will use this same test as a disease monitoring tool if you already have osteoporosis. -All adults age 38-68 who are overweight should have a diabetes screening test once every three years.  
-Other screening tests and preventive services for persons with diabetes include: an eye exam to screen for diabetic retinopathy, a kidney function test, a foot exam, and stricter control over your cholesterol.  
-Cardiovascular screening for adults with routine risk involves an electrocardiogram (ECG) at intervals determined by your doctor.  
-Colorectal cancer screenings should be done for adults age 54-65 with no increased risk factors for colorectal cancer. There are a number of acceptable methods of screening for this type of cancer. Each test has its own benefits and drawbacks. Discuss with your doctor what is most appropriate for you during your annual wellness visit. The different tests include: colonoscopy (considered the best screening method), a fecal occult blood test, a fecal DNA test, and sigmoidoscopy. -Breast cancer screenings are recommended every other year for women of normal risk, age 54-69. 
-Cervical cancer screenings for women over age 72 are only recommended with certain risk factors.  
-All adults born between [facility] should be screened once for Hepatitis C. Here is a list of your current Health Maintenance items (your personalized list of preventive services) with a due date: 
Health Maintenance Due Topic Date Due  Stool testing for trace blood  02/22/2019 [address] [place] [address] Annual Well Visit  02/23/2019

## 2019-04-18 NOTE — PROGRESS NOTES
Chief Complaint Patient presents with [facility] Annual Wellness Visit  Nausea  
  nausea x2 weeks I have reviewed the patient's medical history in detail and updated the computerized patient record. Health Maintenance reviewed. 1. Have you been to the ER, urgent care clinic since your last visit? Hospitalized since your last visit?no 2. Have you seen or consulted any other health care providers outside of the [address] [place] [address] since your last visit? Include any pap smears or colon screening. No 
 
Encouraged pt to discuss pt's wishes with spouse/partner/family and bring them in the next appt to follow thru with the Advanced Directive Fall Risk Assessment, last 12 mths 4/18/2019 Able to walk? Yes Fall in past 12 months? No  
 
 
3 most recent PHQ Screens 4/18/2019 PHQ Not Done - Little interest or pleasure in doing things Several days Feeling down, depressed, irritable, or hopeless Several days Total Score PHQ 2 2 Abuse Screening Questionnaire 4/18/2019 Do you ever feel afraid of your partner? Vilma Trivedi Are you in a relationship with someone who physically or mentally threatens you? Vilma Trivedi Is it safe for you to go home? Y  
 
 
 
 
====Honoring Choices Invitation==== 
 
 Patient was invited to [facility] on this date and given the information folder for review. Recommended appointment with Honoring Choices facilitator for ACP conversation regarding advance directives. [x] Yes  [] No  Referral sent to [facility] Choices team member or Coordinator for follow-up 
 
[] Yes  [x] No  Patient scheduled an appointment.     
 
Site of Referral:[facility]

## 2019-04-18 NOTE — ACP (ADVANCE CARE PLANNING)
====Robyn Perez Invitation====    Patient was invited to [facility] on this date and given the information folder for review. Recommended appointment with [facility] facilitator for ACP conversation regarding advance directives. [x] Yes  [] No  Referral sent to [facility] Choices team member or Coordinator for follow-up    [] Yes  [x] No  Patient scheduled an appointment.        Site of Referral:[facility]

## 2019-04-18 NOTE — PROGRESS NOTES
This is the Subsequent Medicare Annual Wellness Exam, performed 12 months or more after the Initial AWV or the last Subsequent AWV I have reviewed the patient's medical history in detail and updated the computerized patient record. History Past Medical History:  
Diagnosis Date  Allergic rhinitis  Anxiety  Bipolar disorder ([address] 2299-3170  Depression DX 2014-15/ [address] [facility] Annalise Tang - counselor  Environmental allergies  High cholesterol  Hypertension   
 > 10 years  Lichen planus Located Mouth/Dentist/ DX 5 years / Dr. Kaitlin Stubbs [facility].  Migraine 2014 - 2015  PTSD (post-traumatic stress disorder) 2014 - 2015  Tendonitis Both feet/  Thyroid disease 20 year ago  TIA (transient ischemic attack) Past Surgical History:  
Procedure Laterality Date  HX CYST INCISION AND DRAINAGE  HX ORTHOPAEDIC    
 tarsal tunnel Current Outpatient Medications Medication Sig Dispense Refill  OXcarbazepine (TRILEPTAL) 150 mg tablet Take 150 mg by mouth two (2) times a day.  ziprasidone (GEODON) 20 mg capsule Take 1 Cap by mouth daily. With breakfast 30 Cap 2  clonazePAM (KLONOPIN) 0.5 mg tablet Take 0.5 Tabs by mouth daily. Max Daily Amount: 0.25 mg. May take whole tab for sleep as needed. 28 Tab 1  
 amLODIPine (NORVASC) 5 mg tablet Take 1 Tab by mouth daily. 90 Tab 1  
 levothyroxine (SYNTHROID) 88 mcg tablet Take 1 Tab by mouth Daily (before breakfast). 30 Tab 5  
 atorvastatin (LIPITOR) 40 mg tablet Take 1 Tab by mouth daily. 90 Tab 1  
 zolpidem CR (AMBIEN CR) 12.5 mg tablet Take 1 Tab by mouth nightly as needed for Sleep. Max Daily Amount: 12.5 mg. 90 Tab 1  
 ziprasidone hcl (GEODON) 60 mg capsule Take 1 Cap by mouth daily. 90 Cap 1  
 busPIRone (BUSPAR) 30 mg tablet Take 1 Tab by mouth two (2) times a day.  60 Tab 2  
  aspirin 81 mg chewable tablet Take 81 mg by mouth daily.  triamcinolone acetonide (KENALOG) 0.1 % dental paste apply to affected area three times a day  0 Allergies Allergen Reactions  Benicar [Olmesartan] Itching  Penicillins Rash Family History Problem Relation Age of Onset Job Eva Other Mother ABD aneurysm, carotid stenosis  High Cholesterol Father Social History Tobacco Use  Smoking status: Never Smoker  Smokeless tobacco: Never Used Substance Use Topics  Alcohol use: No  
 
Patient Active Problem List  
Diagnosis Code  Acquired hypothyroidism E03.9  Essential hypertension I10  
 High cholesterol E78.00  Tremor R25.1  Bipolar I disorder with anxious distress ([facility] F31.9  ANASTASIA (generalized anxiety disorder) F41.1  PTSD (post-traumatic stress disorder) F43.10  Insomnia disorder with non-sleep disorder mental comorbidity G47.00 Depression Risk Factor Screening:  
 
3 most recent PHQ Screens 4/18/2019 PHQ Not Done - Little interest or pleasure in doing things Several days Feeling down, depressed, irritable, or hopeless Several days Total Score PHQ 2 2 Alcohol Risk Factor Screening: You do not drink alcohol or very rarely. Functional Ability and Level of Safety:  
Hearing Loss Hearing is good. Activities of Daily Living The home contains: no safety equipment. Patient does total self care Fall Risk Fall Risk Assessment, last 12 mths 4/18/2019 Able to walk? Yes Fall in past 12 months? No  
 
 
Abuse Screen Patient is not abused Cognitive Screening Evaluation of Cognitive Function: 
Has your family/caregiver stated any concerns about your memory: no 
Normal, Clock Drawing test 
 
Patient Care Team  
Patient Care Team: 
Tootie Martin MD as PCP - [facility] [place]) Jarod Murrieta MD (Neurology) Assessment/Plan Education and counseling provided: 
Colorectal cancer screening tests Cardiovascular screening blood test 
Diabetes screening test 
 
Diagnoses and all orders for this visit: 
 
1. Medicare annual wellness visit, subsequent 2. Screening for alcoholism -     CO ANNUAL ALCOHOL SCREEN 15 MIN 3. Screening for depression 
-     Suzie Keene 4. Screening for diabetes mellitus 5. Screening for ischemic heart disease -     LIPID PANEL Health Maintenance Due Topic Date Due  
 FOBT Q 1 YEAR AGE 50-75  02/22/2019  MEDICARE YEARLY EXAM  02/23/2019

## 2019-04-19 LAB
ALBUMIN SERPL-MCNC: 5.1 G/DL (ref 3.5–5.5)
ALBUMIN/GLOB SERPL: 2.6 {RATIO} (ref 1.2–2.2)
ALP SERPL-CCNC: 96 IU/L (ref 39–117)
ALT SERPL-CCNC: 12 IU/L (ref 0–32)
AST SERPL-CCNC: 17 IU/L (ref 0–40)
BILIRUB SERPL-MCNC: 0.3 MG/DL (ref 0–1.2)
BUN SERPL-MCNC: 13 MG/DL (ref 6–24)
BUN/CREAT SERPL: 15 (ref 9–23)
CALCIUM SERPL-MCNC: 9.9 MG/DL (ref 8.7–10.2)
CHLORIDE SERPL-SCNC: 100 MMOL/L (ref 96–106)
CHOLEST SERPL-MCNC: 196 MG/DL (ref 100–199)
CO2 SERPL-SCNC: 25 MMOL/L (ref 20–29)
CREAT SERPL-MCNC: 0.89 MG/DL (ref 0.57–1)
GLOBULIN SER CALC-MCNC: 2 G/DL (ref 1.5–4.5)
GLUCOSE SERPL-MCNC: 91 MG/DL (ref 65–99)
HBV SURFACE AG SERPL QL IA: NEGATIVE
HCV AB S/CO SERPL IA: <0.1 S/CO RATIO (ref 0–0.9)
HCV AB SERPL QL IA: NORMAL
HDLC SERPL-MCNC: 73 MG/DL
INTERPRETATION, 910389: NORMAL
LDLC SERPL CALC-MCNC: 106 MG/DL (ref 0–99)
LIPASE SERPL-CCNC: 18 U/L (ref 14–72)
POTASSIUM SERPL-SCNC: 4.6 MMOL/L (ref 3.5–5.2)
PROT SERPL-MCNC: 7.1 G/DL (ref 6–8.5)
RPR SER QL: NON REACTIVE
SODIUM SERPL-SCNC: 141 MMOL/L (ref 134–144)
TRIGL SERPL-MCNC: 85 MG/DL (ref 0–149)
VLDLC SERPL CALC-MCNC: 17 MG/DL (ref 5–40)

## 2019-04-19 NOTE — TELEPHONE ENCOUNTER
Requested Prescriptions     Pending Prescriptions Disp Refills    levothyroxine (SYNTHROID) 88 mcg tablet 30 Tab 5     Sig: Take 1 Tab by mouth Daily (before breakfast).  Indications: hypothyroidism     Future Appointments:  7/17/2019  8:45 AM Rosalva aJckson MD   Last Appointment With Me:  4/18/2019

## 2019-04-21 RX ORDER — LEVOTHYROXINE SODIUM 88 UG/1
88 TABLET ORAL
Qty: 30 TAB | Refills: 5 | Status: SHIPPED | OUTPATIENT
Start: 2019-04-21 | End: 2019-08-06

## 2019-06-02 DIAGNOSIS — R09.81 NASAL SINUS CONGESTION: ICD-10-CM

## 2019-06-02 RX ORDER — LEVOCETIRIZINE DIHYDROCHLORIDE 5 MG/1
TABLET, FILM COATED ORAL
Qty: 90 TAB | Refills: 1 | Status: SHIPPED | OUTPATIENT
Start: 2019-06-02 | End: 2019-07-09

## 2019-06-26 LAB — HEMOCCULT STL QL IA: NEGATIVE

## 2019-07-31 PROBLEM — F31.9 AFFECTIVE PSYCHOSIS, BIPOLAR (HCC): Status: ACTIVE | Noted: 2019-07-31

## 2019-07-31 PROBLEM — F31.4 BIPOLAR I DISORDER, MOST RECENT EPISODE DEPRESSED, SEVERE WITHOUT PSYCHOTIC FEATURES (HCC): Status: ACTIVE | Noted: 2018-12-20

## 2019-07-31 RX ORDER — ATORVASTATIN CALCIUM 40 MG/1
TABLET, FILM COATED ORAL
Qty: 90 TAB | Refills: 1 | Status: SHIPPED | OUTPATIENT
Start: 2019-07-31 | End: 2020-02-12 | Stop reason: ALTCHOICE

## 2019-07-31 RX ORDER — AMLODIPINE BESYLATE 5 MG/1
TABLET ORAL
Qty: 90 TAB | Refills: 1 | Status: SHIPPED | OUTPATIENT
Start: 2019-07-31 | End: 2019-12-19 | Stop reason: ALTCHOICE

## 2019-08-01 PROBLEM — F32.A DEPRESSION: Status: ACTIVE | Noted: 2019-08-01

## 2019-08-01 PROBLEM — F31.5 BIPOLAR AFFECTIVE DISORDER, DEPRESSED, SEVERE, WITH PSYCHOTIC BEHAVIOR (HCC): Status: ACTIVE | Noted: 2018-12-20

## 2019-09-25 ENCOUNTER — OFFICE VISIT (OUTPATIENT)
Dept: NEUROLOGY | Age: 54
End: 2019-09-25

## 2019-09-25 ENCOUNTER — TELEPHONE (OUTPATIENT)
Dept: NEUROLOGY | Age: 54
End: 2019-09-25

## 2019-09-25 VITALS
OXYGEN SATURATION: 99 % | HEART RATE: 88 BPM | BODY MASS INDEX: 23.74 KG/M2 | HEIGHT: 63 IN | DIASTOLIC BLOOD PRESSURE: 76 MMHG | SYSTOLIC BLOOD PRESSURE: 114 MMHG | WEIGHT: 134 LBS

## 2019-09-25 DIAGNOSIS — G25.70 MEDICATION-INDUCED MOVEMENT DISORDER: ICD-10-CM

## 2019-09-25 DIAGNOSIS — G25.0 ESSENTIAL TREMOR: Primary | ICD-10-CM

## 2019-09-25 RX ORDER — PROPRANOLOL HYDROCHLORIDE 10 MG/1
TABLET ORAL
Qty: 60 TAB | Refills: 1 | Status: SHIPPED | OUTPATIENT
Start: 2019-09-25 | End: 2019-12-19 | Stop reason: ALTCHOICE

## 2019-09-25 RX ORDER — TRAZODONE HYDROCHLORIDE 100 MG/1
TABLET ORAL
Refills: 0 | COMMUNITY
Start: 2019-08-19 | End: 2019-09-27

## 2019-09-25 NOTE — PROGRESS NOTES
NEUROLOGY CLINIC NOTE    Patient ID:  Murtaza Martin  [id number]  1 y.o.  1965    Date of Visit:  September 25, 2019    Referring Physician: Yaritza Nicolas NP    Reason for Visit:  Tremors     Chief Complaint   Patient presents with    Tremors         History of Present Illness:     Patient Active Problem List    Diagnosis Date Noted    Depression 08/01/2019    Affective psychosis, bipolar ([facility] 07/31/2019    Insomnia disorder with non-sleep disorder mental comorbidity 03/12/2019    Bipolar affective disorder, depressed, severe, with psychotic behavior ([facility] 12/20/2018    ANASTASIA (generalized anxiety disorder) 12/20/2018    PTSD (post-traumatic stress disorder) 12/20/2018    Tremor 05/31/2018    Essential hypertension 02/02/2018    High cholesterol 02/02/2018    Acquired hypothyroidism 12/22/2017     Past Medical History:   Diagnosis Date    Allergic rhinitis     Anxiety     Bipolar disorder ([facility]     [id number]    Depression     DX 2014-15/ [address], [facility] Fili Vaz - counselor    Environmental allergies     High cholesterol     Hypertension     > 10 years    Lichen planus     Located Mouth/Dentist/ DX 5 years / Dr. Sarika Cornell [facility].  Migraine     2014 - 2015    PTSD (post-traumatic stress disorder)     2014 - 2015    Tendonitis     Both feet/    Thyroid disease     20 year ago    TIA (transient ischemic attack)       Past Surgical History:   Procedure Laterality Date    HX CYST INCISION AND DRAINAGE      HX ORTHOPAEDIC      tarsal tunnel      Prior to Admission medications    Medication Sig Start Date End Date Taking? Authorizing Provider   zolpidem (AMBIEN) 10 mg tablet Take 1 Tab by mouth nightly as needed for Sleep. Max Daily Amount: 10 mg. 9/16/19  Yes Kiana Albrecht MD   risperiDONE (RISPERDAL) 2 mg tablet Take 1 Tab by mouth nightly.  8/29/19  Yes Kiana Albrecht MD   ALPRAZolam Macrina Iroquois) 0.5 mg tablet Take 1 Tab by mouth two (2) times daily as needed for Anxiety. Max Daily Amount: 1 mg. 8/6/19  Yes Padmini Winkler MD   levothyroxine (SYNTHROID) 100 mcg tablet Take 1 Tab by mouth Daily (before breakfast). 8/7/19  Yes Padmini Winkler MD   atorvastatin (LIPITOR) 40 mg tablet TAKE 1 TABLET DAILY 7/31/19  Yes Herbert Herbert MD   amLODIPine (NORVASC) 5 mg tablet TAKE 1 TABLET DAILY 7/31/19  Yes Herbert Herbert MD   busPIRone (BUSPAR) 30 mg tablet Take 1 Tab by mouth two (2) times a day. 6/27/19  Yes Ana Funez NP   lamoTRIgine (LAMICTAL) 100 mg tablet Take 1 Tab by mouth daily. 5/9/19  Yes Ana Funez NP   lamoTRIgine (LAMICTAL) 200 mg tablet Take 1 Tab by mouth daily. 5/9/19  Yes Ana Funez NP   aspirin 81 mg chewable tablet Take 81 mg by mouth daily. Yes Provider, Historical   clomiPRAMINE (ANAFRANIL) 50 mg capsule Take 1 Cap by mouth daily AND 2 Caps nightly. 9/5/19   Padmini Winkler MD   lithium carbonate SR (LITHOBID) 300 mg CR tablet Take 1 Tab by mouth two (2) times a day. 8/6/19   Padmini Winkler MD     Allergies   Allergen Reactions    Benicar [Olmesartan] Itching    Penicillins Rash      Social History     Tobacco Use    Smoking status: Never Smoker    Smokeless tobacco: Never Used   Substance Use Topics    Alcohol use: No      Family History   Problem Relation Age of Onset    Other Mother         ABD aneurysm, carotid stenosis    High Cholesterol Father         Subjective:      Torres Almonte is a 53 y.o. HKHK with history of anxiety, depression, bipolar disorder, PTSD, migraines, hypertension, hypercholesterolemia and thyroid disorder who was referred here by Nevaeh Snider (MENG) for further evaluation of possible TIA and tremors. Per record and patient she was seen and admitted from the ER at [facility] last 5/19/2018 for altered mental status. Condition started 3 to 5 hours prior. Periods of confusion.  No somnolence, no seizures, no unresponsiveness, no weakness, no agitation, no delusions, no hallucinations, no self-injury, no violence, no tingling and no numbness. History in the ER was largely supplied by her spouse. Patient kept answering she cannot remember to most questions. Patient drove to [place], [place] the day before for a family gathering. She felt well then. She left there at 03:00 this morning and drove herself home. Once home, she spoke to her daughter who then notified the patient's  that she seemed confused. He called the patient at home and confirmed that the patient was confused. She couldn't remember her 's name. Her words were clear, in sentences, but inappropriate. She said her vision \"feels like it is vibrating back and forth. \" She also feels more tremulous than usual (frequent feels tremulous). She had a similar problem about 2 years ago of confusion and anxiety. She was reportedly diagnosed with a TIA. Head CT without contrast was negative. Labs revealed unremarkable CBC, CMP, alcohol and ABHISHEK screen. Low TSH (0.16) and low magnesium (1.4). Elevated cholesterol and LDL. Patient was discharged 5/20/2018. Patient was seen by her PCP last 5/22/2018. Note mentions Geodon was increased and patient was started on Cogentin for tremors prior to the episode of confusion that led to the ER visit and admission. Patient was told to stop Cogentin and referred here for the tremors and altered mental status. Per patient since then, her mentation is at baseline. No recurrence of the episode of confusion and memory lapses. She feels more tremulous especially with her hands since changes to her medication was done. Since the last visit on 7/5/2018, patient reports worsening of her tremors but her  reports improvement. She is not on Benztropine anymore. She was seen at [facility] ER last  9/11/2019 for worsening tremors after stopping Lithium. Worried that she may have PD. CBC and CMP were unremarkable. Lithium level was low at 0.23.   Review of records also reveal patient was admitted to [facility] by psychiatry for worsening depression and nihilistic type thoughts from 7/31/2019 to 8/6/2019. Diagnosed with bipolar disorder currently depressed with severe psychosis. History of PTSD. Mixed anxiety disorder with obsessive-compulsive traits. History of alcohol use disorder. Patient then was discharged on risperidone, trazodone, lithium, Lamictal, BuSpar and Xanax. Psychiatrist apparently started the patient on clomipramine which he took for a month and seemed to have made her tremors worse. She has stopped that medication for the past week and does not note any change. Per patient tremors are mainly with the use of the hand or when holding onto something. More noticeable when she writes. No tremors at rest.    Outside reports reviewed: Hospital records, ER notes, labs    Review of Systems:    A comprehensive review of systems was negative except for:   Neurological: positive for tremors  Behavioral/Psych: positive for anxiety and depression    Objective:     Visit Vitals  /76   Pulse 88   Ht 5' 3\" (1.6 m)   Wt 134 lb (60.8 kg)   SpO2 99%   BMI 23.74 kg/m²       PHYSICAL EXAM:      NEUROLOGICAL EXAM:    Appearance: The patient is well developed, well nourished, provides a coherent history and is in no acute distress. Mental Status: Oriented to time, place and person. Fluent, no aphasia or dysarthria. Flat affect and masked facies. Cranial Nerves:   II - XII were intact. Motor:  5/5 strength. Normal bulk and tone UE. No pronator drift. No bradykinesia. Reflexes:   Deep tendon reflexes were symmetrical. Downgoing toes. Sensory:   Normal to cold, pinprick and vibration. Gait:  No Romberg. No right finger pill rolling observed when walking today and good arm swing. No stoop posture. No turn en bloc. Tremor:   Mild postural and intentional UE tremors noted today. Cerebellar:  Intact FTN/ISAURO/HTS.        Assessment:   Essential tremors  Medication induced movement disorder - improved  Encephalopathy    Plan:   1. Exam currently mainly reveals mild postural and intentional upper extremity tremors. No resting tremors. Certainly patient may have elements of essential tremors. Discussed treatment options. Given the primidone may worsen patient's read issues with depression and suicidal ideation, it was decided to try patient on low-dose beta-blocker. Trial of propranolol 10 mg daily initially and up to 10 mg twice daily if necessary. Prescriptions provided. Further titration will depend upon response and tolerability. Patient was advised to monitor blood pressure. Patient was advised to do routine structured exercises. 2. Neurological examination reveals no increase tone or rigidity. Masked faces. No bradykinesia. No evidence for PD. Previously responded to Benztropine. History of medication induced movement disorder or parkinsonism. Reminded patient that likely the use of current medication for her psychiatric condition and its benefit outweighs the side effects. All questions and concerns were answered. Visit lasted 40 minutes. Greater than 50% was spent reviewing her medical records as summarized above, discussion about her condition, etiology, prognosis, discussion about side effects from psychiatric medication that causes movement disorder or extrapyramidal symptoms, discussion about benefits of psychiatric medication outweigh potential side effect, reassured patient that she does not have Parkinson's disease, agree with thought processes that she may have components of essential tremors, difference between essential tremors and movement disorder caused by her medication, encouraged to do routine structured exercises, treatment options, medication    This note was created using voice recognition software. Despite editing, there may be syntax errors.

## 2019-09-25 NOTE — TELEPHONE ENCOUNTER
Spoke with patient and informed her that Dr. Pricila Gomez stated that since Dr. Shelly Ascencio had prescribed it she would need to talk with Dr. Shelly Ascencio about changing it. She stated understanding.

## 2019-09-25 NOTE — LETTER
9/25/19 Patient: Yung Ching YOB: 1965 Date of Visit: 9/25/2019 Ric Espinosa MD 
[address] [address] VIA In Basket Dear Ric Espinosa MD, Thank you for referring Ms. Quoc Rodriguez to [address] [place] [address] for evaluation. My notes for this consultation are attached. If you have questions, please do not hesitate to call me. I look forward to following your patient along with you. Sincerely, Ravi Newby MD

## 2019-09-25 NOTE — TELEPHONE ENCOUNTER
Patient states that the propranolol is not working she would like to know if there is an alternative

## 2019-09-25 NOTE — PATIENT INSTRUCTIONS
A Healthy Lifestyle: Care Instructions  Your Care Instructions    A healthy lifestyle can help you feel good, stay at a healthy weight, and have plenty of energy for both work and play. A healthy lifestyle is something you can share with your whole family. A healthy lifestyle also can lower your risk for serious health problems, such as high blood pressure, heart disease, and diabetes. You can follow a few steps listed below to improve your health and the health of your family. Follow-up care is a key part of your treatment and safety. Be sure to make and go to all appointments, and call your doctor if you are having problems. It's also a good idea to know your test results and keep a list of the medicines you take. How can you care for yourself at home? · Do not eat too much sugar, fat, or fast foods. You can still have dessert and treats now and then. The goal is moderation. · Start small to improve your eating habits. Pay attention to portion sizes, drink less juice and soda pop, and eat more fruits and vegetables. ? Eat a healthy amount of food. A 3-ounce serving of meat, for example, is about the size of a deck of cards. Fill the rest of your plate with vegetables and whole grains. ? Limit the amount of soda and sports drinks you have every day. Drink more water when you are thirsty. ? Eat at least 5 servings of fruits and vegetables every day. It may seem like a lot, but it is not hard to reach this goal. A serving or helping is 1 piece of fruit, 1 cup of vegetables, or 2 cups of leafy, raw vegetables. Have an apple or some carrot sticks as an afternoon snack instead of a candy bar. Try to have fruits and/or vegetables at every meal.  · Make exercise part of your daily routine. You may want to start with simple activities, such as walking, bicycling, or slow swimming. Try to be active 30 to 60 minutes every day. You do not need to do all 30 to 60 minutes all at once.  For example, you can exercise 3 times a day for 10 or 20 minutes. Moderate exercise is safe for most people, but it is always a good idea to talk to your doctor before starting an exercise program.  · Keep moving. Marco Juarez the lawn, work in the garden, or ECO-SAFE. Take the stairs instead of the elevator at work. · If you smoke, quit. People who smoke have an increased risk for heart attack, stroke, cancer, and other lung illnesses. Quitting is hard, but there are ways to boost your chance of quitting tobacco for good. ? Use nicotine gum, patches, or lozenges. ? Ask your doctor about stop-smoking programs and medicines. ? Keep trying. In addition to reducing your risk of diseases in the future, you will notice some benefits soon after you stop using tobacco. If you have shortness of breath or asthma symptoms, they will likely get better within a few weeks after you quit. · Limit how much alcohol you drink. Moderate amounts of alcohol (up to 2 drinks a day for men, 1 drink a day for women) are okay. But drinking too much can lead to liver problems, high blood pressure, and other health problems. Family health  If you have a family, there are many things you can do together to improve your health. · Eat meals together as a family as often as possible. · Eat healthy foods. This includes fruits, vegetables, lean meats and dairy, and whole grains. · Include your family in your fitness plan. Most people think of activities such as jogging or tennis as the way to fitness, but there are many ways you and your family can be more active. Anything that makes you breathe hard and gets your heart pumping is exercise. Here are some tips:  ? Walk to do errands or to take your child to school or the bus.  ? Go for a family bike ride after dinner instead of watching TV. Where can you learn more? Go to http://alivia-maritza.info/. Enter V235 in the search box to learn more about \"A Healthy Lifestyle: Care Instructions. \"  Current as of: May 28, 2019  Content Version: 12.2  © 6808-3309 [employer]. Care instructions adapted under license by [employer] (which disclaims liability or warranty for this information). If you have questions about a medical condition or this instruction, always ask your healthcare professional. [address] any warranty or liability for your use of this information. Office Policies    o Phone calls/patient messages:  Please allow up to 24 hours for someone in the office to contact you about your call or message. Be mindful your provider may be out of the office or your message may require further review. We encourage you to use [employer] for your messages as this is a faster, more efficient way to communicate with our office    o Medication Refills:  Prescription medications require up to 48 business hours to process. We encourage you to use [employer] for your refills. For controlled medications: Please allow up to 72 business hours to process. Certain medications may require you to  a written prescription at our office. NO narcotic/controlled medications will be prescribed after 4pm Monday through Friday or on weekends    o Form/Paperwork Completion:  We ask that you allow 7-14 business days. You may also download your forms to [employer] to have your doctor print off. Benign Essential Tremor: Care Instructions  Your Care Instructions    Benign essential tremor is a medical term for shaking that you can't control. Your hand or fingers may shake when you lift a cup or point at something. Or your voice may shake when you speak. This type of tremor is not harmful. It is not caused by a stroke or Parkinson's disease. Some things can affect how much you shake. For example, drinking or eating something with caffeine may make tremors worse for a while. Some medicines also can increase tremors.  These include antidepressants and too much thyroid replacement. Talk to your doctor if you think one of your medicines makes your tremors worse. If you are self-conscious about your tremors, there are some things you can do to reduce them or make them less noticeable. This includes taking medicine. Follow-up care is a key part of your treatment and safety. Be sure to make and go to all appointments, and call your doctor if you are having problems. It's also a good idea to know your test results and keep a list of the medicines you take. How can you care for yourself at home? · Take your medicines exactly as prescribed. Call your doctor if you think you are having a problem with your medicine. Some medicines that help control tremors have to be taken every day, even if you are not having tremors. You will get more details on the specific medicines your doctor prescribes. · Get plenty of rest.  · Eat a balanced, healthy diet. · Try to reduce stress. Regular exercise and massages may help. · Limit alcohol. Heavy drinking can make your tremors worse. · Avoid drinks or foods with caffeine if they make your tremors worse. These include tea, cola, coffee, and chocolate. · Wear a heavy bracelet or watch. This adds a little weight to your hand. The extra weight may reduce tremors. · Drink from cups or glasses that are only half full. You may also want to try drinking with a straw. When should you call for help? Watch closely for changes in your health, and be sure to contact your doctor if:    · You notice your tremors are getting worse.     · You can't do your everyday activities because of your tremors.     · You are sad and embarrassed about your shaking. Where can you learn more? Go to http://alivia-maritza.info/. Enter B746 in the search box to learn more about \"Benign Essential Tremor: Care Instructions. \"  Current as of: March 28, 2019  Content Version: 12.2  © 4537-3959 [employer].  Care instructions adapted under license by [employer] (which disclaims liability or warranty for this information). If you have questions about a medical condition or this instruction, always ask your healthcare professional. [address] any warranty or liability for your use of this information.

## 2019-09-26 ENCOUNTER — TELEPHONE (OUTPATIENT)
Dept: NEUROLOGY | Age: 54
End: 2019-09-26

## 2019-09-27 NOTE — TELEPHONE ENCOUNTER
Spoke with patient and she states she doesn't feel that the propranol is working and she was wondering if there is something she could add with it to help. Please advise.

## 2019-09-27 NOTE — TELEPHONE ENCOUNTER
The plan was to titrate the medication. Tell the patient to increase the dose of Propranolol after a week to 10 mg BID and see how she does.

## 2019-12-19 ENCOUNTER — OFFICE VISIT (OUTPATIENT)
Dept: INTERNAL MEDICINE CLINIC | Age: 54
End: 2019-12-19

## 2019-12-19 VITALS
HEART RATE: 94 BPM | HEIGHT: 63 IN | BODY MASS INDEX: 21.26 KG/M2 | TEMPERATURE: 97.5 F | OXYGEN SATURATION: 90 % | WEIGHT: 120 LBS | SYSTOLIC BLOOD PRESSURE: 116 MMHG | DIASTOLIC BLOOD PRESSURE: 79 MMHG | RESPIRATION RATE: 16 BRPM

## 2019-12-19 DIAGNOSIS — M25.511 CHRONIC RIGHT SHOULDER PAIN: ICD-10-CM
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
## 2019-12-19 DIAGNOSIS — F31.5 BIPOLAR AFFECTIVE DISORDER, DEPRESSED, SEVERE, WITH PSYCHOTIC BEHAVIOR (HCC): ICD-10-CM

## 2019-12-19 DIAGNOSIS — G89.29 CHRONIC RIGHT SHOULDER PAIN: ICD-10-CM

## 2019-12-19 DIAGNOSIS — F43.10 PTSD (POST-TRAUMATIC STRESS DISORDER): ICD-10-CM

## 2019-12-19 DIAGNOSIS — G40.409 TONIC SEIZURE DISORDER (HCC): ICD-10-CM

## 2019-12-19 DIAGNOSIS — I10 ESSENTIAL HYPERTENSION: ICD-10-CM

## 2019-12-19 DIAGNOSIS — E03.9 ACQUIRED HYPOTHYROIDISM: ICD-10-CM

## 2019-12-19 RX ORDER — NAPROXEN 500 MG/1
500 TABLET ORAL 2 TIMES DAILY WITH MEALS
Qty: 60 TAB | Refills: 1 | Status: SHIPPED | OUTPATIENT
Start: 2019-12-19 | End: 2020-01-13 | Stop reason: SDUPTHER

## 2019-12-19 RX ORDER — METHYLPHENIDATE HYDROCHLORIDE 5 MG/1
TABLET ORAL
Refills: 0 | COMMUNITY
Start: 2019-12-11 | End: 2020-01-13 | Stop reason: SDUPTHER

## 2019-12-19 RX ORDER — SERTRALINE HYDROCHLORIDE 50 MG/1
TABLET, FILM COATED ORAL
Refills: 0 | COMMUNITY
Start: 2019-12-09 | End: 2020-01-13 | Stop reason: SDUPTHER

## 2019-12-19 RX ORDER — MIRTAZAPINE 15 MG/1
TABLET, FILM COATED ORAL
Refills: 0 | COMMUNITY
Start: 2019-12-09 | End: 2020-01-13 | Stop reason: SDUPTHER

## 2019-12-19 RX ORDER — DIVALPROEX SODIUM 250 MG/1
250 TABLET, DELAYED RELEASE ORAL 2 TIMES DAILY
COMMUNITY
End: 2020-01-13 | Stop reason: SDUPTHER

## 2019-12-19 RX ORDER — TRAMADOL HYDROCHLORIDE 50 MG/1
TABLET ORAL
Refills: 0 | COMMUNITY
Start: 2019-12-11 | End: 2019-12-19 | Stop reason: ALTCHOICE

## 2019-12-19 NOTE — PROGRESS NOTES
Chief Complaint   Patient presents with    Shoulder Pain     R/shoulder pain     I have reviewed the patient's medical history in detail and updated the computerized patient record. Health Maintenance reviewed. 1. Have you been to the ER, urgent care clinic since your last visit? Hospitalized since your last visit? yes    2. Have you seen or consulted any other health care providers outside of the [address] [place] [address] since your last visit? Include any pap smears or colon screening. Yes      Encouraged pt to discuss pt's wishes with spouse/partner/family and bring them in the next appt to follow thru with the Advanced Directive    Fall Risk Assessment, last 12 mths 4/18/2019   Able to walk? Yes   Fall in past 12 months? No       3 most recent PHQ Screens 4/18/2019   PHQ Not Done -   Little interest or pleasure in doing things Several days   Feeling down, depressed, irritable, or hopeless Several days   Total Score PHQ 2 2       Abuse Screening Questionnaire 4/18/2019   Do you ever feel afraid of your partner? N   Are you in a relationship with someone who physically or mentally threatens you? N   Is it safe for you to go home?  Y       ADL Assessment 4/18/2019   Feeding yourself No Help Needed   Getting from bed to chair No Help Needed   Getting dressed No Help Needed   Bathing or showering No Help Needed   Walk across the room (includes cane/walker) No Help Needed   Using the telphone No Help Needed   Taking your medications No Help Needed   Preparing meals No Help Needed   Managing money (expenses/bills) No Help Needed   Moderately strenuous housework (laundry) No Help Needed   Shopping for personal items (toiletries/medicines) No Help Needed   Shopping for groceries No Help Needed   Driving No Help Needed   Climbing a flight of stairs No Help Needed   Getting to places beyond walking distances No Help Needed
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
## 2020-01-10 ENCOUNTER — TELEPHONE (OUTPATIENT)
Dept: INTERNAL MEDICINE CLINIC | Age: 55
End: 2020-01-10

## 2020-01-10 NOTE — TELEPHONE ENCOUNTER
----- Message from Roswell Kocher sent at 1/10/2020  3:29 PM EST -----  Regarding: Dr. Joe Meckel: [phone]  Patient is needing a form of medical necessity to be completed at sent to Medicare to be approved for home care services. Patient is needing assistance with daily living activities. Please follow up with Janae Canales, patients spouse in reference to this.

## 2020-01-13 DIAGNOSIS — M25.511 CHRONIC RIGHT SHOULDER PAIN: ICD-10-CM

## 2020-01-13 DIAGNOSIS — F31.5 BIPOLAR AFFECTIVE DISORDER, DEPRESSED, SEVERE, WITH PSYCHOTIC BEHAVIOR (HCC): Primary | ICD-10-CM

## 2020-01-13 DIAGNOSIS — G89.29 CHRONIC RIGHT SHOULDER PAIN: ICD-10-CM

## 2020-01-13 RX ORDER — METHYLPHENIDATE HYDROCHLORIDE 5 MG/1
TABLET ORAL
Qty: 30 TAB | Refills: 0 | Status: SHIPPED | OUTPATIENT
Start: 2020-01-13 | End: 2020-02-12 | Stop reason: SDUPTHER

## 2020-01-13 RX ORDER — MIRTAZAPINE 15 MG/1
TABLET, FILM COATED ORAL
Qty: 90 TAB | Refills: 1 | Status: SHIPPED | OUTPATIENT
Start: 2020-01-13 | End: 2020-06-09 | Stop reason: SDUPTHER

## 2020-01-13 RX ORDER — LEVOTHYROXINE SODIUM 100 UG/1
100 TABLET ORAL
Qty: 90 TAB | Refills: 1 | Status: SHIPPED | OUTPATIENT
Start: 2020-01-13 | End: 2020-06-09 | Stop reason: SDUPTHER

## 2020-01-13 RX ORDER — SERTRALINE HYDROCHLORIDE 50 MG/1
TABLET, FILM COATED ORAL
Qty: 90 TAB | Refills: 1 | Status: SHIPPED | OUTPATIENT
Start: 2020-01-13 | End: 2020-06-09 | Stop reason: SDUPTHER

## 2020-01-13 RX ORDER — BUSPIRONE HYDROCHLORIDE 30 MG/1
30 TABLET ORAL 2 TIMES DAILY
Qty: 180 TAB | Refills: 1 | Status: SHIPPED | OUTPATIENT
Start: 2020-01-13 | End: 2020-06-09 | Stop reason: SDUPTHER

## 2020-01-13 RX ORDER — NAPROXEN 500 MG/1
500 TABLET ORAL 2 TIMES DAILY WITH MEALS
Qty: 180 TAB | Refills: 1 | Status: SHIPPED | OUTPATIENT
Start: 2020-01-13 | End: 2020-02-12 | Stop reason: ALTCHOICE

## 2020-01-13 RX ORDER — DIVALPROEX SODIUM 250 MG/1
250 TABLET, DELAYED RELEASE ORAL 2 TIMES DAILY
Qty: 180 TAB | Refills: 1 | Status: SHIPPED | OUTPATIENT
Start: 2020-01-13 | End: 2020-06-09 | Stop reason: SDUPTHER

## 2020-01-13 NOTE — TELEPHONE ENCOUNTER
Requested Prescriptions     Pending Prescriptions Disp Refills    busPIRone (BUSPAR) 30 mg tablet 180 Tab 1     Sig: Take 1 Tab by mouth two (2) times a day.  divalproex DR (DEPAKOTE) 250 mg tablet 180 Tab 1     Sig: Take 1 Tab by mouth two (2) times a day.  levothyroxine (SYNTHROID) 100 mcg tablet 90 Tab 1     Sig: Take 1 Tab by mouth Daily (before breakfast).  methylphenidate HCl (RITALIN) 5 mg tablet 90 Tab 1     Sig: take 1 tablet by mouth every morning    mirtazapine (REMERON) 15 mg tablet 90 Tab 1     Sig: take 1 tablet by mouth at bedtime    naproxen (NAPROSYN) 500 mg tablet 180 Tab 1     Sig: Take 1 Tab by mouth two (2) times daily (with meals). As needed    sertraline (ZOLOFT) 50 mg tablet 90 Tab 1     Sig: take 1 tablet by mouth once daily     Last office visit 12/19/19 future ???????   Last filled  6/27/19 - ? - 8/7/19 - 12/09/19 - 12/19/19 - 12/09/19  Changes made to medication on last visit    None

## 2020-01-13 NOTE — TELEPHONE ENCOUNTER
Requested Prescriptions     Pending Prescriptions Disp Refills    busPIRone (BUSPAR) 30 mg tablet 60 Tab 3     Sig: Take 1 Tab by mouth two (2) times a day.  divalproex DR (DEPAKOTE) 250 mg tablet       Sig: Take 1 Tab by mouth two (2) times a day.  levothyroxine (SYNTHROID) 100 mcg tablet 30 Tab 5     Sig: Take 1 Tab by mouth Daily (before breakfast).  methylphenidate HCl (RITALIN) 5 mg tablet  0    mirtazapine (REMERON) 15 mg tablet  0    naproxen (NAPROSYN) 500 mg tablet 60 Tab 1     Sig: Take 1 Tab by mouth two (2) times daily (with meals). As needed    sertraline (ZOLOFT) 50 mg tablet  0       Pt would like AGELON ?.

## 2020-01-13 NOTE — TELEPHONE ENCOUNTER
Pt's  called earlier stating that [facility] in [place] will not refill pt's meds that we have not sent them over. The change from [facility] to [facility] not going smoothly. Please refill all pt's medication for 90 days with 1 refill.

## 2020-02-03 ENCOUNTER — TELEPHONE (OUTPATIENT)
Dept: INTERNAL MEDICINE CLINIC | Age: 55
End: 2020-02-03

## 2020-02-03 NOTE — TELEPHONE ENCOUNTER
----- Message from Keith Call sent at 2/3/2020 12:30 PM EST -----  Regarding: Dr. Flora Sullivan / telephone  General Message/Vendor Calls    Caller's first and last name      [facility])      Reason for call:   Bela Bella required yes/no and why:   yes if needed      Best contact number(s):  [phone]      Details to clarify the request:  Patient cotter been summons for [employer] 02/15/20 and needs to have a faxed letter from her  stating she is not able to serve due to medical reasons.  Send documentation to   [employer]  fax# [phone]      Keith Call
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
## 2020-02-10 DIAGNOSIS — F31.5 BIPOLAR AFFECTIVE DISORDER, DEPRESSED, SEVERE, WITH PSYCHOTIC BEHAVIOR (HCC): ICD-10-CM

## 2020-02-10 NOTE — TELEPHONE ENCOUNTER
Requested Prescriptions     Pending Prescriptions Disp Refills    methylphenidate HCl (RITALIN) 5 mg tablet 30 Tab 0     Sig: take 1 tablet by mouth every morning

## 2020-02-11 RX ORDER — METHYLPHENIDATE HYDROCHLORIDE 5 MG/1
TABLET ORAL
Qty: 30 TAB | Refills: 0 | OUTPATIENT
Start: 2020-02-11

## 2020-02-11 NOTE — TELEPHONE ENCOUNTER
Requested Prescriptions     Pending Prescriptions Disp Refills    methylphenidate HCl (RITALIN) 5 mg tablet 30 Tab 0     Sig: take 1 tablet by mouth every morning     Last office visit:  12/19/2019  Last filled:  1/13/2020 #30 X no refills  No changes  Follow up 3/10/2020

## 2020-02-12 ENCOUNTER — OFFICE VISIT (OUTPATIENT)
Dept: INTERNAL MEDICINE CLINIC | Age: 55
End: 2020-02-12

## 2020-02-12 VITALS
HEART RATE: 71 BPM | RESPIRATION RATE: 16 BRPM | HEIGHT: 63 IN | TEMPERATURE: 97.2 F | OXYGEN SATURATION: 98 % | DIASTOLIC BLOOD PRESSURE: 79 MMHG | WEIGHT: 123 LBS | SYSTOLIC BLOOD PRESSURE: 120 MMHG | BODY MASS INDEX: 21.79 KG/M2

## 2020-02-12 DIAGNOSIS — E03.9 ACQUIRED HYPOTHYROIDISM: ICD-10-CM

## 2020-02-12 DIAGNOSIS — E78.00 HYPERCHOLESTEREMIA: ICD-10-CM
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
## 2020-02-12 DIAGNOSIS — F32.0 CURRENT MILD EPISODE OF MAJOR DEPRESSIVE DISORDER, UNSPECIFIED WHETHER RECURRENT (HCC): Primary | ICD-10-CM

## 2020-02-12 DIAGNOSIS — G40.409 TONIC SEIZURE DISORDER (HCC): ICD-10-CM

## 2020-02-12 DIAGNOSIS — F31.5 BIPOLAR AFFECTIVE DISORDER, DEPRESSED, SEVERE, WITH PSYCHOTIC BEHAVIOR (HCC): ICD-10-CM

## 2020-02-12 RX ORDER — METHYLPHENIDATE HYDROCHLORIDE 5 MG/1
TABLET ORAL
Qty: 30 TAB | Refills: 0 | Status: SHIPPED | OUTPATIENT
Start: 2020-02-12 | End: 2020-03-03 | Stop reason: SDUPTHER

## 2020-02-12 RX ORDER — METHYLPHENIDATE HYDROCHLORIDE 5 MG/1
TABLET ORAL
Qty: 30 TAB | Refills: 0 | Status: SHIPPED | OUTPATIENT
Start: 2020-02-12 | End: 2020-02-12 | Stop reason: SDUPTHER
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
## 2020-02-12 NOTE — PROGRESS NOTES
Chief Complaint   Patient presents with    Anxiety     med refill     I have reviewed the patient's medical history in detail and updated the computerized patient record. Health Maintenance reviewed. 1. Have you been to the ER, urgent care clinic since your last visit? Hospitalized since your last visit?no    2. Have you seen or consulted any other health care providers outside of the [address] [place] [address] since your last visit? Include any pap smears or colon screening. No      Encouraged pt to discuss pt's wishes with spouse/partner/family and bring them in the next appt to follow thru with the Advanced Directive    Fall Risk Assessment, last 12 mths 2/12/2020   Able to walk? Yes   Fall in past 12 months? No       3 most recent PHQ Screens 2/12/2020   PHQ Not Done -   Little interest or pleasure in doing things Several days   Feeling down, depressed, irritable, or hopeless Several days   Total Score PHQ 2 2       Abuse Screening Questionnaire 2/12/2020   Do you ever feel afraid of your partner? N   Are you in a relationship with someone who physically or mentally threatens you? N   Is it safe for you to go home?  Y       ADL Assessment 2/12/2020   Feeding yourself No Help Needed   Getting from bed to chair No Help Needed   Getting dressed No Help Needed   Bathing or showering No Help Needed   Walk across the room (includes cane/walker) No Help Needed   Using the telphone No Help Needed   Taking your medications No Help Needed   Preparing meals No Help Needed   Managing money (expenses/bills) No Help Needed   Moderately strenuous housework (laundry) No Help Needed   Shopping for personal items (toiletries/medicines) No Help Needed   Shopping for groceries Help Needed   Driving Help Needed   Climbing a flight of stairs Help Needed   Getting to places beyond walking distances Help Needed

## 2020-02-12 NOTE — LETTER
Alonzo Genoa :1965 MR #:[id number] [address] [address] 1 of 5 CONTROLLED SUBSTANCE AGREEMENT I may be prescribed medications that are controlled substances as part  of my treatment plan for management of my medical condition(s). The goal of my treatment plan is to maintain and/or improve my health and wellbeing. Because controlled substances have an increased risk of abuse or harm, continual re-evaluation is needed determine if the goals of my treatment plan are being met for my safety and the safety of others. Amparo Odell  am entering into this Controlled Substance Agreement with my provider, __________________________________ at [address] [place] [address] . I understand that successful treatment requires mutual trust and honesty between me and my provider. I understand that there are state and federal laws and regulations which apply to the medications that my provider may prescribe that must be followed. I understand there are risks and benefits ts of taking the medicines that my provider may prescribe. I understand and agree that following this Agreement is necessary in continuing my provider-patient relationship and success of my treatment plan. As a part of my treatment plan, I agree to the following: COMMUNICATION: 
 
1. I will communicate fully with my provider about my medical condition(s), including the effect on my daily life and how well my medications are helping. I will tell my provider all of the medications that I take for any reason, including medications I receive from another health care provider, and will notify my provider about all issues, problems or concerns, including any side effects, which may be related to my medications. I understand that this information allows my provider to adjust my treatment plan to help manage my medical condition.  I understand that this information will become part of my permanent medical record. 2. I will notify my provider if I have a history of alcohol/drug misuse/addiction or if I have had treatment for alcohol/drug addiction in the past, or if I have a new problem with or concern about alcohol/drug use/addiction, because this increases the likelihood of high risk behaviors and may lead to serious medical conditions. 3. Females Only: I will notify my provider if I am or become pregnant, or if I intend to become pregnant, or if I intend to breastfeed. I understand that communication of these issues with my provider is important, due to possible effects my medication could have on an unborn fetus or breastfeeding child. Initials_____ Beth Stern :1965 MR #:[id number] Page 2 of 5 MISUSE OF MEDICATIONS / DRUGS: 
 
1. I agree to take all controlled substances as prescribed, and will not misuse or abuse any controlled substances prescribed by my provider. For my safety, I will not increase the amount of medicine I take without first talking with and getting permission from my provider. 2. If I have a medical emergency, another health care provider may prescribe me medication. If I seek emergency treatment, I will notify my provider within seventy-two (72) hours. 3. I understand that my provider may discuss my use and/or possible misuse/abuse of controlled substances and alcohol, as appropriate, with any health care provider involved in my care, pharmacist or legal authority. ILLEGAL DRUGS: 
 
1. I will not use illegal drugs of any kind, including but not limited to marijuana, heroin, cocaine, or any prescription drug which is not prescribed to me. DRUG DIVERSION / PRESCRIPTION FRAUD: 
 
1. I will not share, sell, trade, give away, or otherwise misuse my prescriptions or medications. 2. I will not alter any prescriptions provided to me by my provider. SINGLE PROVIDER: 
 
1. I agree that all controlled substances that I take will be prescribed only by my provider (or his/her covering provider) under this Agreement. This agreement does not prevent me from seeking emergency medical treatment or receiving pain management related to a surgery. PROTECTING MEDICATIONS: 
 
1. I am responsible for keeping my prescriptions and medications in a safe and secure place including safeguarding them from loss or theft. I understand that lost, stolen or damaged/destroyed prescriptions or medications will not be replaced. Initials____ Ezekiel Pat :1965 MR #:[id number] [employer] Page 3 of 5 PRESCRIPTION RENEWALS/REFILLS: 
 
1. I will follow my controlled substance medication schedule as prescribed by my provider. 2. I understand and agree that I will make any requests for renewals or refills of my prescriptions only at the time of an office visit or during my providers regular office hours subject to the prescription refill requirements of the individual practice. 3. I understand that my provider may not call in prescriptions for controlled substances to my pharmacy. 4. I understand that my provider may adjust or discontinue these medications as deemed appropriate for my medical treatment plan. This Agreement does not guarantee the prescription of controlled medications. 5. I agree that if my medications are adjusted or discontinued, I will properly dispose of any remaining medications. I understand that I will be required to dispose of any remaining controlled medications prior to being provided with any prescriptions for other controlled medications. 6. I understand that the renewal of my prescription depends on my medical condition, my consistent participation, and my adherence with my treatment plan and this Agreement.  
 
7. I understand that if I do not keep an appointment with my provider, I may not receive a renewal or refill for my controlled substance medication. PRESCRIPTION MONITORING / DRUG TESTIN. I understand that my provider may require me to provide urine, saliva or blood for testing at any time. I understand that this testing will be used to monitor for safety and adherence with my treatment plan and this Agreement. 2. I understand that my provider may ask me to provide an observed urine specimen, which means that a nurse or other health care provider may watch me provide urine, and I agree to cooperate if I am asked to provide an observed specimen. 3. I understand that if I do not provide urine, saliva or blood samples within two (2) hours of my providers request, or other timeframe decided by my provider, my treatment plan could be changed, or my prescriptions and medications may be changed or ended. 4. I understand that urine, saliva and blood test results will be a part of my permanent medical record. Initials_____ Abner Jaimes :1965 MR #:[id number] Page 4 of 5 
 
5. I understand that my provider is required to obtain a copy of my State Prescription Monitoring Program () Report at any time in order to safely prescribe medications. 6. I will bring all of my prescribed controlled substance medications in their original bottles to all of my scheduled appointments. 7. I understand that my provider may ask me to come to the practice with all of my prescribed medications for a random pill count at any time. I agree to cooperate if I am asked to come in for a random pill count. I understand that if I do not arrive in the timeframe decided by my provider, my treatment plan could be changed, or my prescriptions and medications may be changed or ended.  
 
COOPERATION WITH INVESTIGATIONS: 
 
1. I authorize my provider and my pharmacy to cooperate fully with any local, state, or federal law enforcement agency in the investigation of any possible misuse, sale, or other diversion of my controlled substance prescriptions or medications. RISKS: 
 
1. I understand that my level of consciousness may be affected from the use of controlled substances, and I understand that there are risks, benefits, effects and potential alternatives (including no treatment) to the medications that my provider has prescribed. 2. I understand that I may become drowsy, tired, dizzy, constipated, and sick to my stomach, or have changes in my mood or in my sleep while taking my medications. I have talked with my provider about these possible side effects, risks, benefits, and alternative treatments, and my provider has answered all of my questions. 3. I understand that I should not suddenly stop taking my medications without first speaking with my provider. I understand that if I suddenly stop taking my medications, I may experience nausea, vomiting, sweating,anxiety, sleeplessness, itching or other uncomfortable feelings. 4. I will not take my medications with alcohol of any kind, including beer, wine or liquor. I understand that drinking alcohol with my medications increases the chances of side effects, including breathing problems or even death. 5. I understand that if I have a history of alcoholism or other drug addiction I may be at increased risk of addiction to my medications. Signs of addiction might include craving, compulsive use, and continued use despite harm. Since addiction is a disease, I understand my provider may decide to change my medications and refer me to appropriate treatment services. I understand that this information would become part of my permanent medical record. Initials_____ Tayla Reading :1965 MR #:[id number] Page 5 of 5  
 
 
6.  Females only: Children born to women who regularly take controlled substances are likely to have physical problems and suffer withdrawal symptoms at birth. If I am of child-bearing age, I understand that I should use safe and effective birth control while taking any controlled substances to avoid the impact of medications on an unborn fetus or  child. I agree to notify my provider immediately if I should become pregnant so that my treatment plan can be adjusted. 7. Males only: I understand that chronic use of controlled substances has been associated with low testosterone levels in males which may affect my mood, stamina, sexual desire, and general health. I understand that my provider may order the appropriate laboratory test to determine my testosterone level,and I agree to this testing. ADHERENCE: 
 
1. I understand that if I do not adhere to this Agreement in any way, my provider may change my prescriptions, stop prescribing controlled substances or end our provider-patient relationship. 2. If my provider decides to stop prescribing medication, or decides to end our provider-patient relationship,my provider may require that I taper my medications slowly. If necessary, my provider may also provide a prescription for other medications to treat my withdrawal symptoms. UNDERSTANDING THIS AGREEMENT: 
 
I understand that my provider may adjust or stop my prescriptions for controlled substances based on my medical condition and my treatment plan. I understand that this Agreement does not guarantee that I will be prescribed medications or controlled substances. I understand that controlled substances may be just one part 
of my treatment plan. My initial on each page and my signature below shows that I have read each page of this Agreement, I have had an opportunity to ask questions, and all of my questions have been answered to my satisfaction by my provider.  
 
By signing below, I agree to comply with this Agreement, and I understand that if I do not follow the Agreements listed above, my provider may stop 
 
_________________________________________  Date/Time 2/12/2020 3:08 PM   
             (Patient Signature) 
 
________________________________________    Date/Time 2/12/2020 3:08 PM 
 (Parent or Guardian Signature if <18 yrs) 
 
_________________________________________ Date/Time 2/12/2020 3:08 PM 
 (Provider Signature)

## 2020-03-03 DIAGNOSIS — F31.5 BIPOLAR AFFECTIVE DISORDER, DEPRESSED, SEVERE, WITH PSYCHOTIC BEHAVIOR (HCC): ICD-10-CM

## 2020-03-04 RX ORDER — METHYLPHENIDATE HYDROCHLORIDE 5 MG/1
TABLET ORAL
Qty: 30 TAB | Refills: 0 | Status: SHIPPED | OUTPATIENT
Start: 2020-03-04 | End: 2020-04-08 | Stop reason: SDUPTHER

## 2020-03-04 NOTE — TELEPHONE ENCOUNTER
Requested Prescriptions     Pending Prescriptions Disp Refills    methylphenidate HCl (RITALIN) 5 mg tablet 30 Tab 0     Sig: take 1 tablet by mouth every morning     Last office visit:  2/12/2020  Last filled: 2/12/2020 #30 X no refills  Follow up 5/1/2020

## 2020-04-08 DIAGNOSIS — F31.5 BIPOLAR AFFECTIVE DISORDER, DEPRESSED, SEVERE, WITH PSYCHOTIC BEHAVIOR (HCC): ICD-10-CM

## 2020-04-08 RX ORDER — METHYLPHENIDATE HYDROCHLORIDE 5 MG/1
TABLET ORAL
Qty: 30 TAB | Refills: 0 | Status: SHIPPED | OUTPATIENT
Start: 2020-04-08 | End: 2020-05-08 | Stop reason: SDUPTHER

## 2020-04-08 NOTE — TELEPHONE ENCOUNTER
Requested Prescriptions     Pending Prescriptions Disp Refills    methylphenidate HCl (RITALIN) 5 mg tablet 30 Tab 0     Sig: take 1 tablet by mouth every morning     Last office visit 2/12/20  Last filled  3/4/20  Changes made to medication on last visit  None

## 2020-04-13 ENCOUNTER — TELEPHONE (OUTPATIENT)
Dept: INTERNAL MEDICINE CLINIC | Age: 55
End: 2020-04-13

## 2020-04-15 ENCOUNTER — DOCUMENTATION ONLY (OUTPATIENT)
Dept: INTERNAL MEDICINE CLINIC | Age: 55
End: 2020-04-15

## 2020-04-15 NOTE — PROGRESS NOTES
4/15/20 PA completed thru cover my med's and sent for Methylphenidate Hcl 5 mg tabs. Outcome details will be fax to office within 5 business. Please follow up Thanks

## 2020-04-20 ENCOUNTER — TELEPHONE (OUTPATIENT)
Dept: INTERNAL MEDICINE CLINIC | Age: 55
End: 2020-04-20

## 2020-04-20 DIAGNOSIS — I10 ESSENTIAL HYPERTENSION: ICD-10-CM

## 2020-04-20 DIAGNOSIS — R55 SYNCOPE, UNSPECIFIED SYNCOPE TYPE: Primary | ICD-10-CM

## 2020-04-20 DIAGNOSIS — E03.9 ACQUIRED HYPOTHYROIDISM: ICD-10-CM

## 2020-04-20 DIAGNOSIS — E78.00 ELEVATED CHOLESTEROL: ICD-10-CM

## 2020-04-20 NOTE — TELEPHONE ENCOUNTER
Returned call and pt has gotten light headedness and passed out for several seconds on 2 different occasions. Pt's  would like lab orders for cholesterol and blood sugars.

## 2020-04-20 NOTE — TELEPHONE ENCOUNTER
Pts , cameron, called in reference to wanting to know if orders can be sent to [facility] to check cholesterol and blood sugar. He said he is concerned because she has been getting light headed and 2 diff occasions she passed out. Please call him at [phone].

## 2020-04-21 ENCOUNTER — TELEPHONE (OUTPATIENT)
Dept: INTERNAL MEDICINE CLINIC | Age: 55
End: 2020-04-21

## 2020-04-21 NOTE — TELEPHONE ENCOUNTER
Received notification from pharmacy PA request for Ritalin has been denied for coverage - not a covered medication for Dx of Bipolar - will need to make an addendum in patients chart and write a lete regarding why patient needs to be taking the Ritalin and resubmit to [facility], LPN  4/86/0236  1:50 PM

## 2020-04-27 ENCOUNTER — DOCUMENTATION ONLY (OUTPATIENT)
Dept: INTERNAL MEDICINE CLINIC | Age: 55
End: 2020-04-27

## 2020-04-27 NOTE — PROGRESS NOTES
Yohannes, [facility]    NOTICE OF DENIAL     Methylphenidate 5 mg tablet  Was denied    [phone] if question.

## 2020-04-30 ENCOUNTER — DOCUMENTATION ONLY (OUTPATIENT)
Dept: INTERNAL MEDICINE CLINIC | Age: 55
End: 2020-04-30

## 2020-05-08 DIAGNOSIS — F31.5 BIPOLAR AFFECTIVE DISORDER, DEPRESSED, SEVERE, WITH PSYCHOTIC BEHAVIOR (HCC): ICD-10-CM

## 2020-05-09 RX ORDER — METHYLPHENIDATE HYDROCHLORIDE 5 MG/1
TABLET ORAL
Qty: 30 TAB | Refills: 0 | Status: SHIPPED | OUTPATIENT
Start: 2020-05-09 | End: 2020-06-09 | Stop reason: SDUPTHER

## 2020-05-14 ENCOUNTER — DOCUMENTATION ONLY (OUTPATIENT)
Dept: INTERNAL MEDICINE CLINIC | Age: 55
End: 2020-05-14

## 2020-06-09 ENCOUNTER — VIRTUAL VISIT (OUTPATIENT)
Dept: INTERNAL MEDICINE CLINIC | Age: 55
End: 2020-06-09

## 2020-06-09 DIAGNOSIS — F32.0 CURRENT MILD EPISODE OF MAJOR DEPRESSIVE DISORDER WITHOUT PRIOR EPISODE (HCC): Primary | ICD-10-CM

## 2020-06-09 DIAGNOSIS — E03.9 ACQUIRED HYPOTHYROIDISM: ICD-10-CM

## 2020-06-09 DIAGNOSIS — F41.9 ANXIETY: ICD-10-CM

## 2020-06-09 DIAGNOSIS — F31.5 BIPOLAR AFFECTIVE DISORDER, DEPRESSED, SEVERE, WITH PSYCHOTIC BEHAVIOR (HCC): ICD-10-CM

## 2020-06-09 RX ORDER — SERTRALINE HYDROCHLORIDE 50 MG/1
TABLET, FILM COATED ORAL
Qty: 90 TAB | Refills: 1 | Status: SHIPPED | OUTPATIENT
Start: 2020-06-09 | End: 2020-07-09

## 2020-06-09 RX ORDER — MIRTAZAPINE 15 MG/1
TABLET, FILM COATED ORAL
Qty: 90 TAB | Refills: 1 | Status: SHIPPED | OUTPATIENT
Start: 2020-06-09 | End: 2020-07-09

## 2020-06-09 RX ORDER — BUSPIRONE HYDROCHLORIDE 30 MG/1
30 TABLET ORAL 2 TIMES DAILY
Qty: 180 TAB | Refills: 1 | Status: SHIPPED | OUTPATIENT
Start: 2020-06-09 | End: 2021-07-03

## 2020-06-09 RX ORDER — LEVOTHYROXINE SODIUM 100 UG/1
100 TABLET ORAL
Qty: 90 TAB | Refills: 1 | Status: SHIPPED | OUTPATIENT
Start: 2020-06-09 | End: 2020-07-09

## 2020-06-09 RX ORDER — DIVALPROEX SODIUM 250 MG/1
250 TABLET, DELAYED RELEASE ORAL 2 TIMES DAILY
Qty: 180 TAB | Refills: 1 | Status: SHIPPED | OUTPATIENT
Start: 2020-06-09 | End: 2020-07-09

## 2020-06-09 RX ORDER — METHYLPHENIDATE HYDROCHLORIDE 5 MG/1
TABLET ORAL
Qty: 30 TAB | Refills: 0 | Status: SHIPPED | OUTPATIENT
Start: 2020-06-09 | End: 2020-07-06 | Stop reason: SDUPTHER

## 2020-06-09 NOTE — PROGRESS NOTES
Chief Complaint   Patient presents with    Medication Refill     Ritalin refill     Fall Risk Assessment, last 12 mths 6/9/2020   Able to walk? Yes   Fall in past 12 months? No       3 most recent PHQ Screens 6/9/2020   PHQ Not Done -   Little interest or pleasure in doing things Not at all   Feeling down, depressed, irritable, or hopeless Not at all   Total Score PHQ 2 0       Abuse Screening Questionnaire 6/9/2020   Do you ever feel afraid of your partner? N   Are you in a relationship with someone who physically or mentally threatens you? N   Is it safe for you to go home?  Y       ADL Assessment 6/9/2020   Feeding yourself No Help Needed   Getting from bed to chair No Help Needed   Getting dressed Help Needed   Bathing or showering Help Needed   Walk across the room (includes cane/walker) No Help Needed   Using the telphone Help Needed   Taking your medications Help Needed   Preparing meals Help Needed   Managing money (expenses/bills) Help Needed   Moderately strenuous housework (laundry) Help Needed   Shopping for personal items (toiletries/medicines) Help Needed   Shopping for groceries Help Needed   Driving Help Needed   Climbing a flight of stairs Help Needed   Getting to places beyond walking distances Help Needed
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
## 2020-06-16 ENCOUNTER — TELEPHONE (OUTPATIENT)
Dept: INTERNAL MEDICINE CLINIC | Age: 55
End: 2020-06-16

## 2020-06-16 NOTE — TELEPHONE ENCOUNTER
----- Message from Wesley Singletary sent at 6/16/2020  1:33 PM EDT -----  Regarding: Dr. Corey Sic: Aiyana Knight, is requesting a call back in reference to paperwork being completed to have patient's placker renewed.

## 2020-06-25 ENCOUNTER — VIRTUAL VISIT (OUTPATIENT)
Dept: NEUROLOGY | Age: 55
End: 2020-06-25

## 2020-06-25 DIAGNOSIS — R56.9 SEIZURE (HCC): Primary | ICD-10-CM

## 2020-06-25 DIAGNOSIS — F31.31 BIPOLAR AFFECTIVE DISORDER, CURRENTLY DEPRESSED, MILD (HCC): ICD-10-CM

## 2020-06-25 DIAGNOSIS — E03.9 ACQUIRED HYPOTHYROIDISM: ICD-10-CM
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
## 2020-07-06 DIAGNOSIS — F31.5 BIPOLAR AFFECTIVE DISORDER, DEPRESSED, SEVERE, WITH PSYCHOTIC BEHAVIOR (HCC): ICD-10-CM

## 2020-07-06 NOTE — TELEPHONE ENCOUNTER
Requested Prescriptions     Pending Prescriptions Disp Refills    methylphenidate HCl (RITALIN) 5 mg tablet 30 Tab 0     Sig: take 1 tablet by mouth every morning all other ROS negative except as per HPI

## 2020-07-07 RX ORDER — METHYLPHENIDATE HYDROCHLORIDE 5 MG/1
TABLET ORAL
Qty: 30 TAB | Refills: 0 | Status: SHIPPED | OUTPATIENT
Start: 2020-07-07 | End: 2020-08-10 | Stop reason: SDUPTHER

## 2020-07-08 DIAGNOSIS — F32.0 CURRENT MILD EPISODE OF MAJOR DEPRESSIVE DISORDER WITHOUT PRIOR EPISODE (HCC): ICD-10-CM

## 2020-07-08 DIAGNOSIS — E03.9 ACQUIRED HYPOTHYROIDISM: ICD-10-CM

## 2020-07-08 DIAGNOSIS — F31.5 BIPOLAR AFFECTIVE DISORDER, DEPRESSED, SEVERE, WITH PSYCHOTIC BEHAVIOR (HCC): ICD-10-CM

## 2020-07-09 RX ORDER — DIVALPROEX SODIUM 250 MG/1
TABLET, DELAYED RELEASE ORAL
Qty: 180 TAB | Refills: 1 | Status: SHIPPED | OUTPATIENT
Start: 2020-07-09 | End: 2020-12-30

## 2020-07-09 RX ORDER — LEVOTHYROXINE SODIUM 100 UG/1
TABLET ORAL
Qty: 90 TAB | Refills: 1 | Status: SHIPPED | OUTPATIENT
Start: 2020-07-09 | End: 2020-11-03 | Stop reason: DRUGHIGH

## 2020-07-09 RX ORDER — SERTRALINE HYDROCHLORIDE 50 MG/1
TABLET, FILM COATED ORAL
Qty: 90 TAB | Refills: 1 | Status: SHIPPED | OUTPATIENT
Start: 2020-07-09 | End: 2021-07-03

## 2020-07-09 RX ORDER — MIRTAZAPINE 15 MG/1
TABLET, FILM COATED ORAL
Qty: 90 TAB | Refills: 1 | Status: SHIPPED | OUTPATIENT
Start: 2020-07-09 | End: 2021-01-04 | Stop reason: SDUPTHER

## 2020-08-05 DIAGNOSIS — F31.5 BIPOLAR AFFECTIVE DISORDER, DEPRESSED, SEVERE, WITH PSYCHOTIC BEHAVIOR (HCC): ICD-10-CM

## 2020-08-05 RX ORDER — METHYLPHENIDATE HYDROCHLORIDE 5 MG/1
TABLET ORAL
Qty: 30 TAB | Refills: 0 | Status: CANCELLED | OUTPATIENT
Start: 2020-08-05

## 2020-08-05 NOTE — TELEPHONE ENCOUNTER
Requested Prescriptions     Pending Prescriptions Disp Refills    methylphenidate HCl (RITALIN) 5 mg tablet 30 Tab 0     Sig: take 1 tablet by mouth every morning Benzoyl Peroxide Pregnancy And Lactation Text: This medication is Pregnancy Category C. It is unknown if benzoyl peroxide is excreted in breast milk.

## 2020-08-09 ENCOUNTER — TELEPHONE (OUTPATIENT)
Dept: INTERNAL MEDICINE CLINIC | Age: 55
End: 2020-08-09

## 2020-08-09 NOTE — TELEPHONE ENCOUNTER
Spoke with Reyes. He stated his wifes Methylphenidate was not refilled yet. Stated she took her last pill today and can have a seizure if she does not take it. Called and left Dr. Cindi Elder a message to see whether he wants to refill it himself today. Awaiting call back.

## 2020-08-09 NOTE — TELEPHONE ENCOUNTER
Called Mr Patricio Kumar back and let him know we do not refill controlled substances over the weekend but Dr. Roderick Gallegos stated he would refill it on Monday.

## 2020-08-10 ENCOUNTER — TELEPHONE (OUTPATIENT)
Dept: INTERNAL MEDICINE CLINIC | Age: 55
End: 2020-08-10

## 2020-08-10 DIAGNOSIS — F31.5 BIPOLAR AFFECTIVE DISORDER, DEPRESSED, SEVERE, WITH PSYCHOTIC BEHAVIOR (HCC): ICD-10-CM

## 2020-08-10 RX ORDER — METHYLPHENIDATE HYDROCHLORIDE 5 MG/1
TABLET ORAL
Qty: 30 TAB | Refills: 0 | Status: SHIPPED | OUTPATIENT
Start: 2020-08-10 | End: 2020-09-10 | Stop reason: SDUPTHER

## 2020-09-10 ENCOUNTER — VIRTUAL VISIT (OUTPATIENT)
Dept: INTERNAL MEDICINE CLINIC | Age: 55
End: 2020-09-10
Payer: COMMERCIAL

## 2020-09-10 DIAGNOSIS — F32.0 CURRENT MILD EPISODE OF MAJOR DEPRESSIVE DISORDER WITHOUT PRIOR EPISODE (HCC): Primary | ICD-10-CM

## 2020-09-10 DIAGNOSIS — F41.1 GAD (GENERALIZED ANXIETY DISORDER): ICD-10-CM

## 2020-09-10 DIAGNOSIS — F31.5 BIPOLAR AFFECTIVE DISORDER, DEPRESSED, SEVERE, WITH PSYCHOTIC BEHAVIOR (HCC): ICD-10-CM

## 2020-09-10 PROCEDURE — G9899 SCRN MAM PERF RSLTS DOC: HCPCS | Performed by: NURSE PRACTITIONER

## 2020-09-10 PROCEDURE — G8756 NO BP MEASURE DOC: HCPCS | Performed by: NURSE PRACTITIONER

## 2020-09-10 PROCEDURE — 99214 OFFICE O/P EST MOD 30 MIN: CPT | Performed by: NURSE PRACTITIONER

## 2020-09-10 PROCEDURE — G8427 DOCREV CUR MEDS BY ELIG CLIN: HCPCS | Performed by: NURSE PRACTITIONER

## 2020-09-10 PROCEDURE — G9717 DOC PT DX DEP/BP F/U NT REQ: HCPCS | Performed by: NURSE PRACTITIONER

## 2020-09-10 PROCEDURE — 3017F COLORECTAL CA SCREEN DOC REV: CPT | Performed by: NURSE PRACTITIONER

## 2020-09-10 RX ORDER — METHYLPHENIDATE HYDROCHLORIDE 5 MG/1
TABLET ORAL
Qty: 30 TAB | Refills: 0 | Status: SHIPPED | OUTPATIENT
Start: 2020-09-10 | End: 2020-10-06 | Stop reason: SDUPTHER
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
## 2020-10-19 ENCOUNTER — TELEPHONE (OUTPATIENT)
Dept: INTERNAL MEDICINE CLINIC | Age: 55
End: 2020-10-19

## 2020-10-29 LAB
ALBUMIN SERPL-MCNC: 4.6 G/DL (ref 3.8–4.9)
ALBUMIN/GLOB SERPL: 2.2 {RATIO} (ref 1.2–2.2)
ALP SERPL-CCNC: 74 IU/L (ref 39–117)
ALT SERPL-CCNC: 12 IU/L (ref 0–32)
AST SERPL-CCNC: 14 IU/L (ref 0–40)
BILIRUB SERPL-MCNC: 0.2 MG/DL (ref 0–1.2)
BUN SERPL-MCNC: 25 MG/DL (ref 6–24)
BUN/CREAT SERPL: 35 (ref 9–23)
CALCIUM SERPL-MCNC: 10 MG/DL (ref 8.7–10.2)
CHLORIDE SERPL-SCNC: 103 MMOL/L (ref 96–106)
CO2 SERPL-SCNC: 30 MMOL/L (ref 20–29)
CREAT SERPL-MCNC: 0.72 MG/DL (ref 0.57–1)
GLOBULIN SER CALC-MCNC: 2.1 G/DL (ref 1.5–4.5)
GLUCOSE SERPL-MCNC: 88 MG/DL (ref 65–99)
POTASSIUM SERPL-SCNC: 4.7 MMOL/L (ref 3.5–5.2)
PROT SERPL-MCNC: 6.7 G/DL (ref 6–8.5)
SODIUM SERPL-SCNC: 143 MMOL/L (ref 134–144)
VALPROATE SERPL-MCNC: 40 UG/ML (ref 50–100)

## 2020-11-03 ENCOUNTER — TELEPHONE (OUTPATIENT)
Dept: INTERNAL MEDICINE CLINIC | Age: 55
End: 2020-11-03

## 2020-11-03 ENCOUNTER — VIRTUAL VISIT (OUTPATIENT)
Dept: INTERNAL MEDICINE CLINIC | Age: 55
End: 2020-11-03
Payer: COMMERCIAL

## 2020-11-03 DIAGNOSIS — E03.9 ACQUIRED HYPOTHYROIDISM: Primary | ICD-10-CM

## 2020-11-03 DIAGNOSIS — F31.0 BIPOLAR AFFECTIVE DISORDER, CURRENT EPISODE HYPOMANIC (HCC): ICD-10-CM

## 2020-11-03 DIAGNOSIS — I10 ESSENTIAL HYPERTENSION: ICD-10-CM

## 2020-11-03 DIAGNOSIS — F31.5 BIPOLAR AFFECTIVE DISORDER, DEPRESSED, SEVERE, WITH PSYCHOTIC BEHAVIOR (HCC): ICD-10-CM

## 2020-11-03 PROCEDURE — G8756 NO BP MEASURE DOC: HCPCS | Performed by: FAMILY MEDICINE

## 2020-11-03 PROCEDURE — G8420 CALC BMI NORM PARAMETERS: HCPCS | Performed by: FAMILY MEDICINE

## 2020-11-03 PROCEDURE — G8427 DOCREV CUR MEDS BY ELIG CLIN: HCPCS | Performed by: FAMILY MEDICINE

## 2020-11-03 PROCEDURE — G9899 SCRN MAM PERF RSLTS DOC: HCPCS | Performed by: FAMILY MEDICINE

## 2020-11-03 PROCEDURE — 99214 OFFICE O/P EST MOD 30 MIN: CPT | Performed by: FAMILY MEDICINE

## 2020-11-03 PROCEDURE — 3017F COLORECTAL CA SCREEN DOC REV: CPT | Performed by: FAMILY MEDICINE

## 2020-11-03 PROCEDURE — G9717 DOC PT DX DEP/BP F/U NT REQ: HCPCS | Performed by: FAMILY MEDICINE

## 2020-11-03 RX ORDER — METHYLPHENIDATE HYDROCHLORIDE 5 MG/1
TABLET ORAL
Qty: 30 TAB | Refills: 0 | Status: SHIPPED | OUTPATIENT
Start: 2020-11-03 | End: 2020-12-07 | Stop reason: SDUPTHER

## 2020-11-03 RX ORDER — LEVOTHYROXINE SODIUM 88 UG/1
88 TABLET ORAL
Qty: 30 TAB | Refills: 5 | Status: SHIPPED | OUTPATIENT
Start: 2020-11-03 | End: 2021-01-22

## 2020-11-03 NOTE — PROGRESS NOTES
Chief Complaint   Patient presents with    Labs     FU     Patient has not been out of the country in (6-7 months), NO diarrhea, NO cough, NO chest conjestion, NO temp. Pt has not been around anyone with these symptoms. I have reviewed the patient's medical history in detail and updated the computerized patient record. Health Maintenance reviewed. 1. Have you been to the ER, urgent care clinic since your last visit? Hospitalized since your last visit?no    2. Have you seen or consulted any other health care providers outside of the [address] [place] [address] since your last visit? Include any pap smears or colon screening. No      Encouraged pt to discuss pt's wishes with spouse/partner/family and bring them in the next appt to follow thru with the Advanced Directive    Fall Risk Assessment, last 12 mths 11/3/2020   Able to walk? Yes   Fall in past 12 months? No       3 most recent PHQ Screens 11/3/2020   PHQ Not Done -   Little interest or pleasure in doing things Several days   Feeling down, depressed, irritable, or hopeless Several days   Total Score PHQ 2 2       Abuse Screening Questionnaire 11/3/2020   Do you ever feel afraid of your partner? N   Are you in a relationship with someone who physically or mentally threatens you? N   Is it safe for you to go home?  Y       ADL Assessment 11/3/2020   Feeding yourself No Help Needed   Getting from bed to chair No Help Needed   Getting dressed No Help Needed   Bathing or showering No Help Needed   Walk across the room (includes cane/walker) No Help Needed   Using the telphone No Help Needed   Taking your medications No Help Needed   Preparing meals No Help Needed   Managing money (expenses/bills) No Help Needed   Moderately strenuous housework (laundry) No Help Needed   Shopping for personal items (toiletries/medicines) No Help Needed   Shopping for groceries No Help Needed   Driving No Help Needed   Climbing a flight of stairs No Help Needed   Getting to places beyond walking distances No Help Needed
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
## 2020-11-10 ENCOUNTER — TELEPHONE (OUTPATIENT)
Dept: NEUROLOGY | Age: 55
End: 2020-11-10

## 2020-11-10 ENCOUNTER — VIRTUAL VISIT (OUTPATIENT)
Dept: NEUROLOGY | Age: 55
End: 2020-11-10
Payer: COMMERCIAL

## 2020-11-10 DIAGNOSIS — R56.9 SEIZURE (HCC): Primary | ICD-10-CM

## 2020-11-10 DIAGNOSIS — E03.9 ACQUIRED HYPOTHYROIDISM: ICD-10-CM

## 2020-11-10 DIAGNOSIS — G25.0 ESSENTIAL TREMOR: ICD-10-CM

## 2020-11-10 DIAGNOSIS — F31.31 BIPOLAR AFFECTIVE DISORDER, CURRENTLY DEPRESSED, MILD (HCC): ICD-10-CM

## 2020-11-10 PROCEDURE — G8420 CALC BMI NORM PARAMETERS: HCPCS | Performed by: PSYCHIATRY & NEUROLOGY

## 2020-11-10 PROCEDURE — G9899 SCRN MAM PERF RSLTS DOC: HCPCS | Performed by: PSYCHIATRY & NEUROLOGY

## 2020-11-10 PROCEDURE — 99214 OFFICE O/P EST MOD 30 MIN: CPT | Performed by: PSYCHIATRY & NEUROLOGY

## 2020-11-10 PROCEDURE — G8756 NO BP MEASURE DOC: HCPCS | Performed by: PSYCHIATRY & NEUROLOGY

## 2020-11-10 PROCEDURE — G8427 DOCREV CUR MEDS BY ELIG CLIN: HCPCS | Performed by: PSYCHIATRY & NEUROLOGY

## 2020-11-10 PROCEDURE — G9717 DOC PT DX DEP/BP F/U NT REQ: HCPCS | Performed by: PSYCHIATRY & NEUROLOGY

## 2020-11-10 PROCEDURE — 3017F COLORECTAL CA SCREEN DOC REV: CPT | Performed by: PSYCHIATRY & NEUROLOGY

## 2020-11-10 RX ORDER — AMPICILLIN TRIHYDRATE 250 MG
600 CAPSULE ORAL
COMMUNITY

## 2020-11-10 RX ORDER — DIVALPROEX SODIUM 125 MG/1
TABLET, DELAYED RELEASE ORAL
Qty: 60 TAB | Refills: 2 | Status: SHIPPED | OUTPATIENT
Start: 2020-11-10 | End: 2021-02-08

## 2020-11-10 NOTE — PROGRESS NOTES
Room: [facility]V [facility]    Identified pt with two pt identifiers(name and ). Reviewed record in preparation for visit and have obtained necessary documentation. All patient medications has been reviewed. Chief Complaint   Patient presents with    Seizure     f/u    Bipolar     f/u     Patient-Reported Vitals 11/10/2020   Patient-Reported Weight 130 lbs   Patient-Reported Height -   Patient-Reported Pulse 61   Patient-Reported Systolic  000   Patient-Reported Diastolic 77      4. Have you been to the ER, urgent care clinic since your last visit? Hospitalized since your last visit? No    5. Have you seen or consulted any other health care providers outside of the [address] [place] [address] since your last visit? Include any pap smears or colon screening. No    Patient is accompanied by self I have received verbal consent from Samantha Cleary to discuss any/all medical information while they are present in the room.
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
## 2020-11-10 NOTE — PROGRESS NOTES
Follow-up Visit    Name Samantha Cleary Age 63 y.o. MRN [id number]  1965       Chief Complaint: seizures    Has a seizure almost every three months. Current dosage keeps her levels right around 40mg. Recommended increasing her dosages. Seizures started a week after she was discharged. Seizures were diagnosed clinically. No study was done. The seizures lasts 20 sec. She becomes quiet. She will move her head to the right and and look up. Her body will stiffen and she will drool and becomes apnic. She does not lose bowel or bladder continence. She is coherent but lethargic in the postictal phase., which lasts about 15- 30 minutes. These occur about once a week. Physical deficits right side hemiparesis. And right foot causes difficulty when walking she is now using a cane and walker. She needs assisstance with bathing and groom but is able to feed herself. Seizure started after attempted suicide. Patient put a gun to her head. This resulted in prolonged hospitalization and permanent disability. Assesment and Plan  1. Seizure [facility])  Discussed the importance of seizure control. Discussed relatively small dose of valproic acid that she is currently on. Depending on the levels I may increase the dose. Also will order an EEG. Discussed the possible toxicities associated with valproic acid including hyperammonemia    Increase depakote to 375 bid    2. Bipolar affective disorder, currently depressed, mild ([address]  Follow psychiatry    3. Acquired hypothyroidism  Continue levothyroxine      Allergies  Benicar [olmesartan] and Penicillins     Medications  Current Outpatient Medications   Medication Sig    red yeast rice extract 600 mg cap Take 600 mg by mouth now.  levothyroxine (SYNTHROID) 88 mcg tablet Take 1 Tab by mouth Daily (before breakfast).     methylphenidate HCl (RITALIN) 5 mg tablet take 1 tablet by mouth every morning    divalproex DR (DEPAKOTE) 250 mg tablet TAKE 1 TABLET BY MOUTH TWICE DAILY    sertraline (ZOLOFT) 50 mg tablet TAKE 1 TABLET BY MOUTH EVERY DAY    mirtazapine (REMERON) 15 mg tablet TAKE 1 TABLET BY MOUTH AT BEDTIME    busPIRone (BUSPAR) 30 mg tablet Take 1 Tab by mouth two (2) times a day. No current facility-administered medications for this visit. Medical History  Past Medical History:   Diagnosis Date    Allergic rhinitis     Anxiety     Bipolar disorder ([facility] [address]     [id number]    Depression     DX 2014-15/ [address], [facility] Nazia Bhat - counselor    Environmental allergies     High cholesterol     Hypertension     > 10 years    Lichen planus     Located Mouth/Dentist/ DX 5 years / Dr. Sierra Campbell, [facility].  Migraine     2014 - 2015    PTSD (post-traumatic stress disorder)     2014 - 2015    Tendonitis     Both feet/    Thyroid disease     20 year ago    TIA (transient ischemic attack)        Review of Systems   Eyes: Negative for blurred vision and double vision. Respiratory: Negative for cough and shortness of breath. Cardiovascular: Negative for chest pain, palpitations and orthopnea. Gastrointestinal: Negative for nausea and vomiting. Musculoskeletal: Positive for back pain. Neurological: Negative for dizziness and headaches. Psychiatric/Behavioral: Negative for depression. The patient is not nervous/anxious. Exam:      General: Well developed, well nourished. Patient in no distress   Head: Normocephalic, atraumatic, anicteric sclera   Neck Normal ROM   Lungs:  Normal effort   Abd: Bowel sounds were audible. Ext: No pedal edema   Skin: Supple no rash     NeurologicExam:  Mental Status: Alert and oriented to person place and time   Speech: Fluent no aphasia or dysarthria. Cranial Nerves:  II - XII Intact   Motor:   Right hemiparesis    Sensory:    Symmetric   Gait:  Gait is cane and walker dependent   Tremor:   No tremor noted.    Cerebellar:  Coordination intact. Lab Review  Lab Results   Component Value Date/Time    WBC 7.9 09/11/2019 05:20 PM    HCT 35.7 09/11/2019 05:20 PM    HGB 11.8 09/11/2019 05:20 PM    PLATELET 128 74/62/7537 05:20 PM       Lab Results   Component Value Date/Time    Sodium 143 10/28/2020 08:30 AM    Potassium 4.7 10/28/2020 08:30 AM    Chloride 103 10/28/2020 08:30 AM    CO2 30 (H) 10/28/2020 08:30 AM    Glucose 88 10/28/2020 08:30 AM    BUN 25 (H) 10/28/2020 08:30 AM    Creatinine 0.72 10/28/2020 08:30 AM    Calcium 10.0 10/28/2020 08:30 AM         Lab Results   Component Value Date/Time    Hemoglobin A1c, External 5.6/scanned 09/27/2017            Lab Results   Component Value Date/Time    Cholesterol, total 256 (H) 10/28/2020 08:27 AM    HDL Cholesterol 58 10/28/2020 08:27 AM    LDL, calculated 106 (H) 04/18/2019 09:10 AM    LDL Chol Calc (NIH) 180 (H) 10/28/2020 08:27 AM    VLDL, calculated 17 04/18/2019 09:10 AM    VLDL Cholesterol Hansel 18 10/28/2020 08:27 AM    Triglyceride 101 10/28/2020 08:27 AM    CHOL/HDL Ratio 3.9 05/20/2018 05:15 AM       Katy German was seen by synchronous (real-time) audio-video technology on 11/10/20. Consent:  She and/or her healthcare decision maker is aware that this patient-initiated Telehealth encounter is a billable service, with coverage as determined by her insurance carrier. She is aware that she may receive a bill and has provided verbal consent to proceed: Yes    I was in the office while conducting this encounter. Pursuant to the emergency declaration under the [address] waiver authority and the [employer] Act, this Virtual  Visit was conducted, with patient's consent, to reduce the patient's risk of exposure to COVID-19 and provide continuity of care for an established patient.      Services were provided through a video synchronous discussion virtually to substitute for in-person clinic visit.        45 minutes spent more than 50% spent in chart review and face to face  examination, discussion of treatment options and approach    35 minutes reviewing records from outside hosp and Dr Cassius Hernandez notes

## 2020-12-30 DIAGNOSIS — F31.5 BIPOLAR AFFECTIVE DISORDER, DEPRESSED, SEVERE, WITH PSYCHOTIC BEHAVIOR (HCC): ICD-10-CM

## 2020-12-30 RX ORDER — DIVALPROEX SODIUM 250 MG/1
TABLET, DELAYED RELEASE ORAL
Qty: 180 TAB | Refills: 1 | Status: SHIPPED | OUTPATIENT
Start: 2020-12-30 | End: 2021-04-03

## 2021-01-05 ENCOUNTER — TELEPHONE (OUTPATIENT)
Dept: NEUROLOGY | Age: 56
End: 2021-01-05

## 2021-01-11 ENCOUNTER — VIRTUAL VISIT (OUTPATIENT)
Dept: NEUROLOGY | Age: 56
End: 2021-01-11
Payer: MEDICAID

## 2021-01-11 DIAGNOSIS — G40.409 TONIC SEIZURE DISORDER (HCC): Primary | ICD-10-CM

## 2021-01-11 DIAGNOSIS — F31.31 BIPOLAR AFFECTIVE DISORDER, CURRENTLY DEPRESSED, MILD (HCC): ICD-10-CM

## 2021-01-11 DIAGNOSIS — E03.9 ACQUIRED HYPOTHYROIDISM: ICD-10-CM

## 2021-01-11 PROCEDURE — G9717 DOC PT DX DEP/BP F/U NT REQ: HCPCS | Performed by: PSYCHIATRY & NEUROLOGY

## 2021-01-11 PROCEDURE — 99214 OFFICE O/P EST MOD 30 MIN: CPT | Performed by: PSYCHIATRY & NEUROLOGY

## 2021-01-11 PROCEDURE — G8756 NO BP MEASURE DOC: HCPCS | Performed by: PSYCHIATRY & NEUROLOGY

## 2021-01-11 PROCEDURE — 3017F COLORECTAL CA SCREEN DOC REV: CPT | Performed by: PSYCHIATRY & NEUROLOGY

## 2021-01-11 PROCEDURE — G8420 CALC BMI NORM PARAMETERS: HCPCS | Performed by: PSYCHIATRY & NEUROLOGY

## 2021-01-11 PROCEDURE — G8427 DOCREV CUR MEDS BY ELIG CLIN: HCPCS | Performed by: PSYCHIATRY & NEUROLOGY

## 2021-01-11 NOTE — PROGRESS NOTES
Follow-up Visit    Name Deja Young Age 63 y.o. MRN [id number]  1965       Chief Complaint: seizures    Returns for follow-up visit. Continues to have periods that are characterized as altered mental status. She has no ricco convulsions. Events not clearly epileptic. Pertinent history: Seizures were diagnosed clinically. No study was done. The seizures lasts 20 sec. She becomes quiet. She will move her head to the right and and look up. Her body will stiffen and she will drool and becomes apnic. She does not lose bowel or bladder continence. She is coherent but lethargic in the postictal phase., which lasts about 15- 30 minutes. These occur about once a week. Physical deficits right side hemiparesis. And right foot causes difficulty when walking she is now using a cane and walker. She needs assisstance with bathing and groom but is able to feed herself. Seizure started after attempted suicide. Patient put a gun to her head. This resulted in prolonged hospitalization and permanent disability. Assesment and Plan  1. Seizure ([address]  Continue Depakote  Avoid Keppra in light of her psych history  Increase depakote to 375 bid  Get levels     2. Bipolar affective disorder, currently depressed, mild ([address]  Follow psychiatry    3. Acquired hypothyroidism  Continue levothyroxine      Allergies  Benicar [olmesartan] and Penicillins     Medications  Current Outpatient Medications   Medication Sig    methylphenidate HCl (RITALIN) 5 mg tablet take 1 tablet by mouth every morning    mirtazapine (REMERON) 15 mg tablet Take 1 Tab by mouth nightly.  divalproex DR (DEPAKOTE) 250 mg tablet TAKE 1 TABLET BY MOUTH TWICE DAILY    red yeast rice extract 600 mg cap Take 600 mg by mouth now.  divalproex DR (DEPAKOTE) 125 mg tablet Take one by mouth twice daily in addition to the 250mg twice daily    levothyroxine (SYNTHROID) 88 mcg tablet Take 1 Tab by mouth Daily (before breakfast).     sertraline (ZOLOFT) 50 mg tablet TAKE 1 TABLET BY MOUTH EVERY DAY   • busPIRone (BUSPAR) 30 mg tablet Take 1 Tab by mouth two (2) times a day.     No current facility-administered medications for this visit.         Medical History  Past Medical History:   Diagnosis Date   • Allergic rhinitis    • Anxiety    • Bipolar disorder (HCC)     [id number]   • Depression     DX 2014-15/ [facility] Chelo Melvin - counselor   • Environmental allergies    • High cholesterol    • Hypertension     > 10 years   • Lichen planus     Located Mouth/Dentist/ DX 5 years / Lupe Luz /hospitals.   • Migraine     2014 - 2015   • PTSD (post-traumatic stress disorder)     2014 - 2015   • Tendonitis     Both feet/   • Thyroid disease     20 year ago   • TIA (transient ischemic attack)        Review of Systems   Eyes: Negative for blurred vision and double vision.   Respiratory: Negative for cough and shortness of breath.    Cardiovascular: Negative for chest pain, palpitations and orthopnea.   Gastrointestinal: Negative for nausea and vomiting.   Musculoskeletal: Positive for back pain.   Neurological: Negative for dizziness and headaches.   Psychiatric/Behavioral: Negative for depression. The patient is not nervous/anxious.        Exam:      General: Well developed, well nourished. Patient in no distress   Head: Normocephalic, atraumatic, anicteric sclera   Neck Normal ROM   Lungs:  Normal effort   Ext: No pedal edema   Skin: Supple no rash     NeurologicExam:  Mental Status: Alert and oriented to person place and time   Speech: Fluent no aphasia    Cranial Nerves:  II - XII Intact   Motor:   Right hemiparesis    Sensory:    Symmetric   Gait:  Gait is cane and walker dependent   Tremor:   No tremor noted.   Cerebellar:  Coordination intact.           Lab Review  Lab Results   Component Value Date/Time    WBC 7.9 09/11/2019 05:20 PM    HCT 35.7 09/11/2019 05:20 PM    HGB 11.8  09/11/2019 05:20 PM    PLATELET 293 86/66/2663 05:20 PM       Lab Results   Component Value Date/Time    Sodium 143 10/28/2020 08:30 AM    Potassium 4.7 10/28/2020 08:30 AM    Chloride 103 10/28/2020 08:30 AM    CO2 30 (H) 10/28/2020 08:30 AM    Glucose 88 10/28/2020 08:30 AM    BUN 25 (H) 10/28/2020 08:30 AM    Creatinine 0.72 10/28/2020 08:30 AM    Calcium 10.0 10/28/2020 08:30 AM         Lab Results   Component Value Date/Time    Hemoglobin A1c, External 5.6/scanned 09/27/2017            Lab Results   Component Value Date/Time    Cholesterol, total 256 (H) 10/28/2020 08:27 AM    HDL Cholesterol 58 10/28/2020 08:27 AM    LDL, calculated 180 (H) 10/28/2020 08:27 AM    LDL, calculated 106 (H) 04/18/2019 09:10 AM    VLDL, calculated 18 10/28/2020 08:27 AM    VLDL, calculated 17 04/18/2019 09:10 AM    Triglyceride 101 10/28/2020 08:27 AM    CHOL/HDL Ratio 3.9 05/20/2018 05:15 AM       Katy Biggs was seen by synchronous (real-time) audio-video technology on 01/11/21. Consent:  She and/or her healthcare decision maker is aware that this patient-initiated Telehealth encounter is a billable service, with coverage as determined by her insurance carrier. She is aware that she may receive a bill and has provided verbal consent to proceed: Yes    I was in the office while conducting this encounter. Pursuant to the emergency declaration under the [address] [facility] waiver authority and the [employer] Act, this Virtual  Visit was conducted, with patient's consent, to reduce the patient's risk of exposure to COVID-19 and provide continuity of care for an established patient. Services were provided through a video synchronous discussion virtually to substitute for in-person clinic visit.

## 2021-01-13 ENCOUNTER — VIRTUAL VISIT (OUTPATIENT)
Dept: INTERNAL MEDICINE CLINIC | Age: 56
End: 2021-01-13
Payer: COMMERCIAL

## 2021-01-13 DIAGNOSIS — E03.9 ACQUIRED HYPOTHYROIDISM: ICD-10-CM

## 2021-01-13 DIAGNOSIS — F32.0 CURRENT MILD EPISODE OF MAJOR DEPRESSIVE DISORDER, UNSPECIFIED WHETHER RECURRENT (HCC): Primary | ICD-10-CM

## 2021-01-13 DIAGNOSIS — F31.5 BIPOLAR AFFECTIVE DISORDER, DEPRESSED, SEVERE, WITH PSYCHOTIC BEHAVIOR (HCC): ICD-10-CM

## 2021-01-13 DIAGNOSIS — E78.00 HYPERCHOLESTEREMIA: ICD-10-CM

## 2021-01-13 PROCEDURE — 99214 OFFICE O/P EST MOD 30 MIN: CPT | Performed by: FAMILY MEDICINE

## 2021-01-13 RX ORDER — METHYLPHENIDATE HYDROCHLORIDE 5 MG/1
TABLET ORAL
Qty: 30 TAB | Refills: 0 | Status: SHIPPED | OUTPATIENT
Start: 2021-01-13 | End: 2021-01-18 | Stop reason: SDUPTHER

## 2021-01-13 NOTE — LETTER
1/13/2021 5:17 PM 
 
Ms. [address] Dear Ms. Gabby Edwards you will find order for labs and your next Virtual Visit Appointment for Thursday, April 8, 2021 @10:00AM.  Please do not hesitate to contact this office if you have any questions. Sincerely, Veronica Urbina MD 
 
 DISPLAY PLAN FREE TEXT

## 2021-01-13 NOTE — PROGRESS NOTES
Health Maintenance reviewed. I have reviewed the patient's medical history in detail and updated the computerized patient record. 1. Have you been to the ER, urgent care clinic since your last visit? Hospitalized since your last visit? yes    2. Have you seen or consulted any other health care providers outside of the [address] [place] [address] [place] since your last visit? Include any pap smears or colon screening. Yes      Encouraged pt to discuss pt's wishes with spouse/partner/family and bring them in the next appt to follow thru with the Advanced Directive    Fall Risk Assessment, last 12 mths 1/13/2021   Able to walk? Yes   Fall in past 12 months? 0   Do you feel unsteady? 0   Are you worried about falling 0   Is TUG test greater than 12 seconds? 0   Is the gait abnormal? 0   Number of falls in past 12 months 0   Fall with injury? 0       3 most recent PHQ Screens 1/13/2021   PHQ Not Done -   Little interest or pleasure in doing things Several days   Feeling down, depressed, irritable, or hopeless Several days   Total Score PHQ 2 2       Abuse Screening Questionnaire 1/13/2021   Do you ever feel afraid of your partner? N   Are you in a relationship with someone who physically or mentally threatens you? N   Is it safe for you to go home?  Y       ADL Assessment 1/13/2021   Feeding yourself No Help Needed   Getting from bed to chair No Help Needed   Getting dressed No Help Needed   Bathing or showering No Help Needed   Walk across the room (includes cane/walker) No Help Needed   Using the telphone No Help Needed   Taking your medications No Help Needed   Preparing meals No Help Needed   Managing money (expenses/bills) No Help Needed   Moderately strenuous housework (laundry) No Help Needed   Shopping for personal items (toiletries/medicines) No Help Needed   Shopping for groceries No Help Needed   Driving No Help Needed   Climbing a flight of stairs No Help Needed   Getting to places beyond walking distances No Help Needed

## 2021-01-18 DIAGNOSIS — F32.0 CURRENT MILD EPISODE OF MAJOR DEPRESSIVE DISORDER, UNSPECIFIED WHETHER RECURRENT (HCC): ICD-10-CM

## 2021-01-18 NOTE — TELEPHONE ENCOUNTER
Pt would like this sent to Pharmacy on file. Thankyou!   CB# [phone]    Requested Prescriptions     Pending Prescriptions Disp Refills    methylphenidate HCl (RITALIN) 5 mg tablet 30 Tab 0     Sig: take 1 tablet by mouth every morning

## 2021-01-20 RX ORDER — METHYLPHENIDATE HYDROCHLORIDE 5 MG/1
TABLET ORAL
Qty: 30 TAB | Refills: 0 | Status: SHIPPED | OUTPATIENT
Start: 2021-01-20 | End: 2021-02-23 | Stop reason: SDUPTHER

## 2021-01-21 LAB
ALBUMIN SERPL-MCNC: 4.5 G/DL (ref 3.8–4.9)
ALBUMIN/GLOB SERPL: 2 {RATIO} (ref 1.2–2.2)
ALP SERPL-CCNC: 80 IU/L (ref 39–117)
ALT SERPL-CCNC: 23 IU/L (ref 0–32)
AMMONIA PLAS-MCNC: 49 UG/DL (ref 34–178)
AST SERPL-CCNC: 25 IU/L (ref 0–40)
BILIRUB SERPL-MCNC: 0.3 MG/DL (ref 0–1.2)
BUN SERPL-MCNC: 11 MG/DL (ref 6–24)
BUN/CREAT SERPL: 14 (ref 9–23)
CALCIUM SERPL-MCNC: 10.1 MG/DL (ref 8.7–10.2)
CHLORIDE SERPL-SCNC: 103 MMOL/L (ref 96–106)
CHOLEST SERPL-MCNC: 255 MG/DL (ref 100–199)
CO2 SERPL-SCNC: 30 MMOL/L (ref 20–29)
CREAT SERPL-MCNC: 0.77 MG/DL (ref 0.57–1)
GLOBULIN SER CALC-MCNC: 2.3 G/DL (ref 1.5–4.5)
GLUCOSE SERPL-MCNC: 89 MG/DL (ref 65–99)
HDLC SERPL-MCNC: 68 MG/DL
INTERPRETATION, 910389: NORMAL
LDLC SERPL CALC-MCNC: 173 MG/DL (ref 0–99)
POTASSIUM SERPL-SCNC: 4.7 MMOL/L (ref 3.5–5.2)
PROT SERPL-MCNC: 6.8 G/DL (ref 6–8.5)
SODIUM SERPL-SCNC: 145 MMOL/L (ref 134–144)
TRIGL SERPL-MCNC: 83 MG/DL (ref 0–149)
TSH SERPL DL<=0.005 MIU/L-ACNC: 0.18 UIU/ML (ref 0.45–4.5)
VALPROATE SERPL-MCNC: 61 UG/ML (ref 50–100)
VLDLC SERPL CALC-MCNC: 14 MG/DL (ref 5–40)

## 2021-01-22 ENCOUNTER — TELEPHONE (OUTPATIENT)
Dept: INTERNAL MEDICINE CLINIC | Age: 56
End: 2021-01-22

## 2021-01-22 DIAGNOSIS — E03.9 ACQUIRED HYPOTHYROIDISM: ICD-10-CM

## 2021-01-22 RX ORDER — LEVOTHYROXINE SODIUM 75 UG/1
TABLET ORAL
Qty: 30 TAB | Refills: 2 | Status: SHIPPED | OUTPATIENT
Start: 2021-01-22 | End: 2021-04-03

## 2021-01-22 NOTE — TELEPHONE ENCOUNTER
From Khari Bella Kane  [facility]   Phone Number:  [phone] (Call me)             General Message/Vendor Calls     Caller's first and last name: Gay Dallas, spouse       Reason for call: Spouse requesting follow up regarding patient's cholesterol medication. Callback required yes/no and why: Yes, discussion of cholesterol medication. Best contact number(s): [phone]         Details to clarify the request: Patient spouse advising that patient recently had lab work for cholesterol levels and is needing to know if patient needs to take Atorvastatin or continue with new medication prescribed. Patient is also requesting to have spouse be contacted to discuss information on her behalf.        Leslie Caban

## 2021-01-22 NOTE — TELEPHONE ENCOUNTER
Send results letter as below:  Your recent lab showed the following abnomality slightly low TSH which indicates thyroid pill hint strong. Reduce dosage of Synthroid to 75 alternating with 88 her current dosage. We need you to return to re-check the laboratory value again in 2 to 3 months. Cholesterol hint high but no need for Lipitor currently.

## 2021-01-22 NOTE — LETTER
1/22/2021 2:58 PM 
 
Ms. [address] [facility] Dear Mr. Lester Fix: 
 
Your recent lab showed the following abnomality slightly low TSH which indicates thyroid pill hint strong. Reduce dosage of Synthroid to 75 alternating with 88 her current dosage. We need you to return to re-check the laboratory value again in 2 to 3 months. Cholesterol hint high but no need for Lipitor currently Sincerely, Alex Lang MD

## 2021-01-25 ENCOUNTER — TELEPHONE (OUTPATIENT)
Dept: NEUROLOGY | Age: 56
End: 2021-01-25

## 2021-01-25 NOTE — TELEPHONE ENCOUNTER
Received Hello message, \"Please call re: wife had virtual appt 2 weeks ago, ws supposed to have EEG set up, hosp [place], they camcelled and wanted to check if there was a local hosp that would do the scan. \"

## 2021-01-27 ENCOUNTER — TELEPHONE (OUTPATIENT)
Dept: INTERNAL MEDICINE CLINIC | Age: 56
End: 2021-01-27

## 2021-01-27 DIAGNOSIS — Z12.39 ENCOUNTER FOR SCREENING FOR MALIGNANT NEOPLASM OF BREAST, UNSPECIFIED SCREENING MODALITY: Primary | ICD-10-CM

## 2021-01-27 NOTE — TELEPHONE ENCOUNTER
Message fr [facility]; forwarding to [facility] nurse pool        [place], [facility] Office Pool             General Message/Vendor Calls     Caller's first and last name: NA     Reason for call: mammogram order     Callback required yes/no and why: Yes     Best contact number(s):[phone]     Details to clarify the request: would like to be scheduled for a mammogram on 2/5/21, near your office.      [employer]

## 2021-01-28 NOTE — TELEPHONE ENCOUNTER
Physical Therapy Progress Note  and Discharge Summary    Visit Count: 9  Plan of Care Dates: 8-1-17 through 9-15-17  additional authorization 9-19-17 through 10-19-17  Insurance Information: 30 visits per year  Next Referring Provider Visit:     Referred by: Nicko Islas MD  Medical Diagnosis (from order): Chronic midline low back pain, with sciatica presence unspecified [M54.5, G89.29]   Insurance: 1. ANTHEM/BC[employer]  2. N/A    Date of Onset: new onset; Chronic  Past 3-4 years   Diagnosis Precautions: activity as tolerated  Relevant co-morbidities and medications: unremarkable  Relevant Tests: None     SUBJECTIVE   Current Pain: 3/10.    Functional Change: Patient currently busy with family member illness and has not been able to attend treatment recently    OBJECTIVE   Patient ambulates with equal weight bearing on both lower extremities.  Transfers completed without guard.  Forward bending finger tips 6 inches from floor; backward bending is to 20 degrees.  No lower extremity myotome deficits    Treatment   Manual Therapy/Intermuscular Mobilization:    Size of needle used: 75 and 100mm; Needle count prior to treatment: 7; Needle count after treatment: 6;  Needling location(s):L-3  L-4 L-5   Electrical stimulation through [place]  frequency at 8  output at 3.5 through 4 needles; Gluteus medius on right  Duration of treatment: 25 minutes.     Therapeutic Exercises:  Hip extension prone  10 reps each leg  Trunk extension from prone  10 reps   Hip abduction with yellow Thera band    Current Home Program (not performed this date except as noted above):   Hip extension prone  10 reps each leg  Trunk extension from prone  10 reps     ASSESSMENT    To date the patient has made gains in activity tolerance about the home including being able to mow the lawn, complete house chores and ambulate for greater distances.  She demonstrates improved gait and transfer ability without guard    Pain after treatment: 1/10.   Pt would like a mammogram order   Compliant with therapy visits and home exercise program: Yes  Progress toward discharge/long term goals: good progress    Patient would continue to benefit from skilled therapy to increase strength/stability, increase range of motion, decrease pain, improve activity tolerance, improve activities of daily living and instrumental activites of daily living, improve body mechanics to address remaining functional limitation(s) in ability to lift and sit for greater periods for general house chores     Result of above outlined education: Verbalizes understanding and Demonstrates understanding    Goals:       To be obtained by end of this plan of care:  1. Patient independent with modified and progressed home exercise program.  2. Patient will report decreased lumbar pain/symptoms to 1/10 to aid in ambulating 30 minutes for independent living.   3. Patient will increase lumbar active range of motion to within normal limits to aid in dressing.  4. Patient will increase involved strength to 5/5 to aid in lifting as required at home and at work.  5. Oswestry: Patient will complete form to reflect an improved score from initial score of 44 to less than or equal to 20% (0-20% = minimal disability; 20-40% = moderate disability; 40-60% = severe disability; 60-80% = crippled; % = bed bound) to indicate pt reported improvement in function/disability/impairment (minimal detectable change: 12%).   Discharge Summary Addendum:  Date: 10/12/2017  Total Number of Visits: 9  Surgery Date: None    patient has been a no show for her last scheduled visits  Status of goals: based on last known status anticipate goals partially met    Treatment Category: Lumbar, Non-Surgical With Radiculopathy  Outcome Measure: Oswestry Disability Index   Initial Outcome Score:  44   Discharge Score Error: Patient Error  Discharge Outcome Score: Not Applicable  Primary Clinician: Evans Pearce      PLAN   Dry Needling lumbar region  L-3 distally   Dry  needling of gluteus iraj and medius.  Therapeutic exercises    Graston Technique to lumbar region and hips    Discharge from PT    THERAPY DAILY BILLING   Primary Insurance: ANTHEM/BCBS  Secondary Insurance: N/A    Evaluation Procedures:  No evaluation codes were used on this date of service    Timed Procedures:  Manual Therapy, 12 minutes  Therapeutic Exercise, 5 minutes  Attended Electrostimulation, 12 minutes    Untimed Procedures:  No untimed codes were used on this date of service    Total Treatment Time: 29 minutes    The referring provider's electronic or written signature on the evaluation authorizes the therapy plan of care.   Electronically sent for physician signature

## 2021-02-01 ENCOUNTER — TELEPHONE (OUTPATIENT)
Dept: NEUROLOGY | Age: 56
End: 2021-02-01

## 2021-02-05 ENCOUNTER — OFFICE VISIT (OUTPATIENT)
Dept: INTERNAL MEDICINE CLINIC | Age: 56
End: 2021-02-05
Payer: MEDICARE

## 2021-02-05 ENCOUNTER — HOSPITAL ENCOUNTER (OUTPATIENT)
Dept: LAB | Age: 56
Discharge: HOME | End: 2021-02-05
Payer: COMMERCIAL

## 2021-02-05 VITALS
SYSTOLIC BLOOD PRESSURE: 122 MMHG | HEART RATE: 66 BPM | BODY MASS INDEX: 23.57 KG/M2 | HEIGHT: 63 IN | OXYGEN SATURATION: 95 % | TEMPERATURE: 97.8 F | RESPIRATION RATE: 16 BRPM | DIASTOLIC BLOOD PRESSURE: 76 MMHG | WEIGHT: 133 LBS

## 2021-02-05 DIAGNOSIS — Z01.419 PAP TEST, AS PART OF ROUTINE GYNECOLOGICAL EXAMINATION: ICD-10-CM

## 2021-02-05 DIAGNOSIS — E03.9 ACQUIRED HYPOTHYROIDISM: Primary | ICD-10-CM

## 2021-02-05 DIAGNOSIS — F31.5 BIPOLAR AFFECTIVE DISORDER, DEPRESSED, SEVERE, WITH PSYCHOTIC BEHAVIOR (HCC): ICD-10-CM

## 2021-02-05 PROCEDURE — 88175 CYTOPATH C/V AUTO FLUID REDO: CPT

## 2021-02-05 PROCEDURE — G9717 DOC PT DX DEP/BP F/U NT REQ: HCPCS | Performed by: NURSE PRACTITIONER

## 2021-02-05 PROCEDURE — 3017F COLORECTAL CA SCREEN DOC REV: CPT | Performed by: NURSE PRACTITIONER

## 2021-02-05 PROCEDURE — G8752 SYS BP LESS 140: HCPCS | Performed by: NURSE PRACTITIONER

## 2021-02-05 PROCEDURE — G8427 DOCREV CUR MEDS BY ELIG CLIN: HCPCS | Performed by: NURSE PRACTITIONER

## 2021-02-05 PROCEDURE — 87624 HPV HI-RISK TYP POOLED RSLT: CPT

## 2021-02-05 PROCEDURE — G8754 DIAS BP LESS 90: HCPCS | Performed by: NURSE PRACTITIONER

## 2021-02-05 PROCEDURE — G8420 CALC BMI NORM PARAMETERS: HCPCS | Performed by: NURSE PRACTITIONER

## 2021-02-05 PROCEDURE — 99214 OFFICE O/P EST MOD 30 MIN: CPT | Performed by: NURSE PRACTITIONER

## 2021-02-05 PROCEDURE — 87591 N.GONORRHOEAE DNA AMP PROB: CPT
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
## 2021-02-05 NOTE — PROGRESS NOTES
Chief Complaint   Patient presents with   [address] Maintenance reviewed. I have reviewed the patient's medical history in detail and updated the computerized patient record. 1. Have you been to the ER, urgent care clinic since your last visit? Hospitalized since your last visit?no    2. Have you seen or consulted any other health care providers outside of the [address] [place] [address] since your last visit? Include any pap smears or colon screening. no    Patient has not been out of the country in (10-11 months), NO diarrhea, NO cough, NO chest conjestion, NO temp. Pt has not been around anyone with these symptoms. Encouraged pt to discuss pt's wishes with spouse/partner/family and bring them in the next appt to follow thru with the Advanced Directive    Fall Risk Assessment, last 12 mths 2/5/2021   Able to walk? Yes   Fall in past 12 months? 1   Do you feel unsteady? 1   Are you worried about falling 1   Is TUG test greater than 12 seconds? -   Is the gait abnormal? 1   Number of falls in past 12 months 2   Fall with injury? 1       3 most recent PHQ Screens 2/5/2021   PHQ Not Done -   Little interest or pleasure in doing things Several days   Feeling down, depressed, irritable, or hopeless Several days   Total Score PHQ 2 2       Abuse Screening Questionnaire 2/5/2021   Do you ever feel afraid of your partner? N   Are you in a relationship with someone who physically or mentally threatens you? N   Is it safe for you to go home?  Y       ADL Assessment 2/5/2021   Feeding yourself No Help Needed   Getting from bed to chair Help Needed   Getting dressed Help Needed   Bathing or showering Help Needed   Walk across the room (includes cane/walker) Help Needed   Using the telphone Help Needed   Taking your medications Help Needed   Preparing meals Help Needed   Managing money (expenses/bills) Help Needed   Moderately strenuous housework (laundry) Help Needed   Shopping for personal items (toiletries/medicines) Help Needed   Shopping for groceries Help Needed   Driving Help Needed   Climbing a flight of stairs Help Needed   Getting to places beyond walking distances Help Needed

## 2021-02-05 NOTE — PATIENT INSTRUCTIONS

## 2021-02-06 DIAGNOSIS — R56.9 SEIZURE (HCC): ICD-10-CM

## 2021-02-08 RX ORDER — DIVALPROEX SODIUM 125 MG/1
TABLET, DELAYED RELEASE ORAL
Qty: 60 TAB | Refills: 2 | Status: SHIPPED | OUTPATIENT
Start: 2021-02-08 | End: 2021-04-29 | Stop reason: SDUPTHER

## 2021-02-10 LAB
C TRACH RRNA SPEC QL NAA+PROBE: NEGATIVE
N GONORRHOEA RRNA SPEC QL NAA+PROBE: NEGATIVE
SPECIMEN SOURCE: NORMAL
T VAGINALIS RRNA SPEC QL NAA+PROBE: NEGATIVE

## 2021-02-18 ENCOUNTER — VIRTUAL VISIT (OUTPATIENT)
Dept: NEUROLOGY | Age: 56
End: 2021-02-18
Payer: MEDICARE

## 2021-02-18 DIAGNOSIS — F31.31 BIPOLAR AFFECTIVE DISORDER, CURRENTLY DEPRESSED, MILD (HCC): ICD-10-CM

## 2021-02-18 DIAGNOSIS — E03.9 ACQUIRED HYPOTHYROIDISM: ICD-10-CM

## 2021-02-18 DIAGNOSIS — R56.9 SEIZURE (HCC): Primary | ICD-10-CM

## 2021-02-18 PROCEDURE — 3017F COLORECTAL CA SCREEN DOC REV: CPT | Performed by: PSYCHIATRY & NEUROLOGY

## 2021-02-18 PROCEDURE — G8427 DOCREV CUR MEDS BY ELIG CLIN: HCPCS | Performed by: PSYCHIATRY & NEUROLOGY

## 2021-02-18 PROCEDURE — G8420 CALC BMI NORM PARAMETERS: HCPCS | Performed by: PSYCHIATRY & NEUROLOGY

## 2021-02-18 PROCEDURE — G8756 NO BP MEASURE DOC: HCPCS | Performed by: PSYCHIATRY & NEUROLOGY

## 2021-02-18 PROCEDURE — 99215 OFFICE O/P EST HI 40 MIN: CPT | Performed by: PSYCHIATRY & NEUROLOGY

## 2021-02-18 PROCEDURE — G9717 DOC PT DX DEP/BP F/U NT REQ: HCPCS | Performed by: PSYCHIATRY & NEUROLOGY

## 2021-02-18 RX ORDER — ECHINACEA 400 MG
CAPSULE ORAL
COMMUNITY

## 2021-02-18 RX ORDER — TOPIRAMATE 50 MG/1
50 TABLET, FILM COATED ORAL 2 TIMES DAILY
Qty: 60 TAB | Refills: 3 | Status: SHIPPED | OUTPATIENT
Start: 2021-02-18 | End: 2021-05-04

## 2021-02-18 RX ORDER — TOPIRAMATE 25 MG/1
TABLET ORAL
Qty: 42 TAB | Refills: 0 | Status: SHIPPED | OUTPATIENT
Start: 2021-02-18 | End: 2021-03-11

## 2021-02-18 RX ORDER — MV/FA/DHA/EPA/FISH OIL/SAW/GNK 400MCG-200
COMBINATION PACKAGE (EA) ORAL
COMMUNITY
End: 2022-03-31 | Stop reason: SINTOL
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
## 2021-04-03 DIAGNOSIS — F31.5 BIPOLAR AFFECTIVE DISORDER, DEPRESSED, SEVERE, WITH PSYCHOTIC BEHAVIOR (HCC): ICD-10-CM

## 2021-04-03 DIAGNOSIS — E03.9 ACQUIRED HYPOTHYROIDISM: ICD-10-CM

## 2021-04-03 RX ORDER — DIVALPROEX SODIUM 250 MG/1
TABLET, DELAYED RELEASE ORAL
Qty: 180 TAB | Refills: 1 | Status: SHIPPED | OUTPATIENT
Start: 2021-04-03 | End: 2021-09-29

## 2021-04-03 RX ORDER — LEVOTHYROXINE SODIUM 75 UG/1
TABLET ORAL
Qty: 30 TAB | Refills: 2 | Status: SHIPPED | OUTPATIENT
Start: 2021-04-03 | End: 2021-07-03

## 2021-04-20 ENCOUNTER — VIRTUAL VISIT (OUTPATIENT)
Dept: NEUROLOGY | Age: 56
End: 2021-04-20
Payer: MEDICARE

## 2021-04-20 DIAGNOSIS — F31.31 BIPOLAR AFFECTIVE DISORDER, CURRENTLY DEPRESSED, MILD (HCC): ICD-10-CM

## 2021-04-20 DIAGNOSIS — E03.9 ACQUIRED HYPOTHYROIDISM: ICD-10-CM

## 2021-04-20 DIAGNOSIS — R56.9 SEIZURE (HCC): Primary | ICD-10-CM

## 2021-04-20 PROCEDURE — 3017F COLORECTAL CA SCREEN DOC REV: CPT | Performed by: PSYCHIATRY & NEUROLOGY

## 2021-04-20 PROCEDURE — G9899 SCRN MAM PERF RSLTS DOC: HCPCS | Performed by: PSYCHIATRY & NEUROLOGY

## 2021-04-20 PROCEDURE — G8420 CALC BMI NORM PARAMETERS: HCPCS | Performed by: PSYCHIATRY & NEUROLOGY

## 2021-04-20 PROCEDURE — G9717 DOC PT DX DEP/BP F/U NT REQ: HCPCS | Performed by: PSYCHIATRY & NEUROLOGY

## 2021-04-20 PROCEDURE — G8427 DOCREV CUR MEDS BY ELIG CLIN: HCPCS | Performed by: PSYCHIATRY & NEUROLOGY

## 2021-04-20 PROCEDURE — 99214 OFFICE O/P EST MOD 30 MIN: CPT | Performed by: PSYCHIATRY & NEUROLOGY

## 2021-04-20 PROCEDURE — G8756 NO BP MEASURE DOC: HCPCS | Performed by: PSYCHIATRY & NEUROLOGY
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
## 2021-04-27 DIAGNOSIS — R56.9 SEIZURE (HCC): ICD-10-CM

## 2021-04-29 DIAGNOSIS — R56.9 SEIZURE (HCC): ICD-10-CM

## 2021-04-29 RX ORDER — DIVALPROEX SODIUM 125 MG/1
TABLET, DELAYED RELEASE ORAL
Qty: 180 TAB | Refills: 3 | Status: SHIPPED | OUTPATIENT
Start: 2021-04-29 | End: 2021-05-12 | Stop reason: ALTCHOICE

## 2021-04-29 NOTE — TELEPHONE ENCOUNTER
Last office visit 4/20/2021  Last med refill 2/8/2021 both refills are done as well.  Patient has one day left please fill asap

## 2021-04-30 RX ORDER — DIVALPROEX SODIUM 125 MG/1
TABLET, DELAYED RELEASE ORAL
Qty: 60 TAB | Refills: 2 | Status: SHIPPED | OUTPATIENT
Start: 2021-04-30 | End: 2021-12-27 | Stop reason: SDUPTHER

## 2021-05-04 RX ORDER — TOPIRAMATE 50 MG/1
TABLET, FILM COATED ORAL
Qty: 60 TAB | Refills: 3 | Status: SHIPPED | OUTPATIENT
Start: 2021-05-04 | End: 2021-06-14

## 2021-05-07 ENCOUNTER — VIRTUAL VISIT (OUTPATIENT)
Dept: INTERNAL MEDICINE CLINIC | Age: 56
End: 2021-05-07
Payer: MEDICAID

## 2021-05-07 DIAGNOSIS — M79.604 PAIN OF RIGHT LOWER EXTREMITY: Primary | ICD-10-CM

## 2021-05-07 DIAGNOSIS — E03.9 ACQUIRED HYPOTHYROIDISM: ICD-10-CM

## 2021-05-07 PROCEDURE — 99443 PR PHYS/QHP TELEPHONE EVALUATION 21-30 MIN: CPT | Performed by: NURSE PRACTITIONER
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
## 2021-05-07 NOTE — PROGRESS NOTES
Chief Complaint   Patient presents with    Depression     FU     Patient is aware that this is a Virtual Visit or Phone Call Only doctor's visit. Patient has not been out of the country in (14 months), NO diarrhea, NO cough, NO chest conjestion, NO temp. Pt has not been around anyone with these symptoms. Health Maintenance reviewed. I have reviewed the patient's medical history in detail and updated the computerized patient record. 1. Have you been to the ER, urgent care clinic since your last visit? Hospitalized since your last visit? 2.  Have you seen or consulted any other health care providers outside of the [address] [place] [address] since your last visit? Include any pap smears or colon screening. Encouraged pt to discuss pt's wishes with spouse/partner/family and bring them in the next appt to follow thru with the Advanced Directive      Fall Risk Assessment, last 12 mths 5/7/2021   Able to walk? Yes   Fall in past 12 months? 1   Do you feel unsteady? -   Are you worried about falling 1   Is TUG test greater than 12 seconds? -   Is the gait abnormal? 1   Number of falls in past 12 months 2   Fall with injury? 1       3 most recent PHQ Screens 5/7/2021   PHQ Not Done -   Little interest or pleasure in doing things More than half the days   Feeling down, depressed, irritable, or hopeless More than half the days   Total Score PHQ 2 4       Abuse Screening Questionnaire 5/7/2021   Do you ever feel afraid of your partner? N   Are you in a relationship with someone who physically or mentally threatens you? N   Is it safe for you to go home?  Y       ADL Assessment 5/7/2021   Feeding yourself No Help Needed   Getting from bed to chair No Help Needed   Getting dressed Help Needed   Bathing or showering Help Needed   Walk across the room (includes cane/walker) Help Needed   Using the telphone Help Needed   Taking your medications Help Needed   Preparing meals Help Needed   Managing money (expenses/bills) Help Needed   Moderately strenuous housework (laundry) Help Needed   Shopping for personal items (toiletries/medicines) Help Needed   Shopping for groceries Help Needed   Driving Help Needed   Climbing a flight of stairs Help Needed   Getting to places beyond walking distances Help Needed

## 2021-05-10 DIAGNOSIS — F32.0 CURRENT MILD EPISODE OF MAJOR DEPRESSIVE DISORDER, UNSPECIFIED WHETHER RECURRENT (HCC): ICD-10-CM

## 2021-05-10 RX ORDER — METHYLPHENIDATE HYDROCHLORIDE 5 MG/1
TABLET ORAL
Qty: 15 TAB | Refills: 0 | OUTPATIENT
Start: 2021-05-10

## 2021-05-10 NOTE — TELEPHONE ENCOUNTER
Patient just had a VV appt on Friday with Afia 5/7/21 . Forgot to mention she needed refill for this medication and needs it ASAP!       Requested Prescriptions     Pending Prescriptions Disp Refills    methylphenidate HCl (RITALIN) 5 mg tablet 15 Tab 0     Sig: take 1 tablet by mouth every morning       Thankyou

## 2021-05-12 ENCOUNTER — VIRTUAL VISIT (OUTPATIENT)
Dept: INTERNAL MEDICINE CLINIC | Age: 56
End: 2021-05-12
Payer: MEDICARE

## 2021-05-12 DIAGNOSIS — F31.70 BIPOLAR DISORDER IN FULL REMISSION, MOST RECENT EPISODE UNSPECIFIED TYPE (HCC): ICD-10-CM

## 2021-05-12 DIAGNOSIS — G40.409 TONIC SEIZURE DISORDER (HCC): ICD-10-CM

## 2021-05-12 DIAGNOSIS — Z12.11 SCREENING FOR COLON CANCER: ICD-10-CM

## 2021-05-12 DIAGNOSIS — I10 ESSENTIAL HYPERTENSION: ICD-10-CM

## 2021-05-12 DIAGNOSIS — Z13.31 SCREENING FOR DEPRESSION: ICD-10-CM

## 2021-05-12 DIAGNOSIS — E78.00 HIGH CHOLESTEROL: ICD-10-CM

## 2021-05-12 DIAGNOSIS — Z00.00 MEDICARE ANNUAL WELLNESS VISIT, SUBSEQUENT: Primary | ICD-10-CM

## 2021-05-12 DIAGNOSIS — F32.0 CURRENT MILD EPISODE OF MAJOR DEPRESSIVE DISORDER, UNSPECIFIED WHETHER RECURRENT (HCC): ICD-10-CM

## 2021-05-12 DIAGNOSIS — E03.9 ACQUIRED HYPOTHYROIDISM: ICD-10-CM

## 2021-05-12 PROCEDURE — G9717 DOC PT DX DEP/BP F/U NT REQ: HCPCS | Performed by: FAMILY MEDICINE

## 2021-05-12 PROCEDURE — G8420 CALC BMI NORM PARAMETERS: HCPCS | Performed by: FAMILY MEDICINE

## 2021-05-12 PROCEDURE — 99214 OFFICE O/P EST MOD 30 MIN: CPT | Performed by: FAMILY MEDICINE

## 2021-05-12 PROCEDURE — G8756 NO BP MEASURE DOC: HCPCS | Performed by: FAMILY MEDICINE

## 2021-05-12 PROCEDURE — G8427 DOCREV CUR MEDS BY ELIG CLIN: HCPCS | Performed by: FAMILY MEDICINE

## 2021-05-12 PROCEDURE — G0439 PPPS, SUBSEQ VISIT: HCPCS | Performed by: FAMILY MEDICINE

## 2021-05-12 PROCEDURE — G9899 SCRN MAM PERF RSLTS DOC: HCPCS | Performed by: FAMILY MEDICINE

## 2021-05-12 PROCEDURE — 3017F COLORECTAL CA SCREEN DOC REV: CPT | Performed by: FAMILY MEDICINE

## 2021-05-12 RX ORDER — LEVOTHYROXINE SODIUM 88 UG/1
88 TABLET ORAL EVERY OTHER DAY
COMMUNITY
Start: 2021-04-13 | End: 2021-06-15 | Stop reason: SDUPTHER

## 2021-05-12 RX ORDER — METHYLPHENIDATE HYDROCHLORIDE 5 MG/1
TABLET ORAL
Qty: 30 TAB | Refills: 0 | Status: SHIPPED | OUTPATIENT
Start: 2021-05-12 | End: 2021-06-15 | Stop reason: SDUPTHER

## 2021-05-12 NOTE — PATIENT INSTRUCTIONS
Medicare Wellness Visit, Female The best way to live healthy is to have a lifestyle where you eat a well-balanced diet, exercise regularly, limit alcohol use, and quit all forms of tobacco/nicotine, if applicable. Regular preventive services are another way to keep healthy. Preventive services (vaccines, screening tests, monitoring & exams) can help personalize your care plan, which helps you manage your own care. Screening tests can find health problems at the earliest stages, when they are easiest to treat. [facility] follows the current, evidence-based guidelines published by the [facility] ([facility]) when recommending preventive services for our patients. Because we follow these guidelines, sometimes recommendations change over time as research supports it. (For example, mammograms used to be recommended annually. Even though Medicare will still pay for an annual mammogram, the newer guidelines recommend a mammogram every two years for women of average risk). Of course, you and your doctor may decide to screen more often for some diseases, based on your risk and your co-morbidities (chronic disease you are already diagnosed with). Preventive services for you include: - Medicare offers their members a free annual wellness visit, which is time for you and your primary care provider to discuss and plan for your preventive service needs. Take advantage of this benefit every year! 
-All adults over the age of 72 should receive the recommended pneumonia vaccines. Current USPSTF guidelines recommend a series of two vaccines for the best pneumonia protection.  
-All adults should have a flu vaccine yearly and a tetanus vaccine every 10 years.  
-All adults age 48 and older should receive the shingles vaccines (series of two vaccines).      
-All adults age 38-68 who are overweight should have a diabetes screening test once every three years.  
-All adults born between [birth year] and 1966 should be screened once for Hepatitis C. 
-Other screening tests and preventive services for persons with diabetes include: an eye exam to screen for diabetic retinopathy, a kidney function test, a foot exam, and stricter control over your cholesterol.  
-Cardiovascular screening for adults with routine risk involves an electrocardiogram (ECG) at intervals determined by your doctor.  
-Colorectal cancer screenings should be done for adults age 54-65 with no increased risk factors for colorectal cancer. There are a number of acceptable methods of screening for this type of cancer. Each test has its own benefits and drawbacks. Discuss with your doctor what is most appropriate for you during your annual wellness visit. The different tests include: colonoscopy (considered the best screening method), a fecal occult blood test, a fecal DNA test, and sigmoidoscopy. 
 
-A bone mass density test is recommended when a woman turns 65 to screen for osteoporosis. This test is only recommended one time, as a screening. Some providers will use this same test as a disease monitoring tool if you already have osteoporosis. -Breast cancer screenings are recommended every other year for women of normal risk, age 54-69. 
-Cervical cancer screenings for women over age 72 are only recommended with certain risk factors. Here is a list of your current Health Maintenance items (your personalized list of preventive services) with a due date: 
Health Maintenance Due Topic Date Due  
 COVID-19 Vaccine (1) Never done  Colorectal Screening  04/18/2020
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
## 2021-05-12 NOTE — PROGRESS NOTES
Chief Complaint   Patient presents with    Seizure     seizure medication refill        Patient is aware that this is a Virtual Visit or Phone Call Only doctor's visit. Patient has not been out of the country in (14 months), NO diarrhea, NO cough, NO chest conjestion, NO temp. Pt has not been around anyone with these symptoms. Health Maintenance reviewed. I have reviewed the patient's medical history in detail and updated the computerized patient record. 1. Have you been to the ER, urgent care clinic since your last visit? Hospitalized since your last visit? 2.  Have you seen or consulted any other health care providers outside of the [address] [place] [address] since your last visit? Include any pap smears or colon screening. Encouraged pt to discuss pt's wishes with spouse/partner/family and bring them in the next appt to follow thru with the Advanced Directive      Fall Risk Assessment, last 12 mths 5/7/2021   Able to walk? Yes   Fall in past 12 months? 1   Do you feel unsteady? -   Are you worried about falling 1   Is TUG test greater than 12 seconds? -   Is the gait abnormal? 1   Number of falls in past 12 months 2   Fall with injury? 1       3 most recent PHQ Screens 5/7/2021   PHQ Not Done -   Little interest or pleasure in doing things More than half the days   Feeling down, depressed, irritable, or hopeless More than half the days   Total Score PHQ 2 4       Abuse Screening Questionnaire 5/7/2021   Do you ever feel afraid of your partner? N   Are you in a relationship with someone who physically or mentally threatens you? N   Is it safe for you to go home?  Y       ADL Assessment 5/7/2021   Feeding yourself No Help Needed   Getting from bed to chair No Help Needed   Getting dressed Help Needed   Bathing or showering Help Needed   Walk across the room (includes cane/walker) Help Needed   Using the telphone Help Needed   Taking your medications Help Needed   Preparing meals Help Needed Managing money (expenses/bills) Help Needed   Moderately strenuous housework (laundry) Help Needed   Shopping for personal items (toiletries/medicines) Help Needed   Shopping for groceries Help Needed   Driving Help Needed   Climbing a flight of stairs Help Needed   Getting to places beyond walking distances Help Needed

## 2021-05-13 RX ORDER — LEVOTHYROXINE SODIUM 88 UG/1
TABLET ORAL
Qty: 30 TAB | Refills: 5 | Status: SHIPPED | OUTPATIENT
Start: 2021-05-13 | End: 2021-12-27 | Stop reason: SDUPTHER

## 2021-05-27 ENCOUNTER — TELEPHONE (OUTPATIENT)
Dept: INTERNAL MEDICINE CLINIC | Age: 56
End: 2021-05-27

## 2021-05-28 NOTE — TELEPHONE ENCOUNTER
Veins standing out after immunization? Blood flow? No pain, Appt tomarrow to be seen.   PLease call them to schedule a f/u

## 2021-06-14 RX ORDER — TOPIRAMATE 50 MG/1
TABLET, FILM COATED ORAL
Qty: 180 TABLET | Refills: 3 | Status: SHIPPED | OUTPATIENT
Start: 2021-06-14 | End: 2021-07-09

## 2021-06-15 ENCOUNTER — VIRTUAL VISIT (OUTPATIENT)
Dept: INTERNAL MEDICINE CLINIC | Age: 56
End: 2021-06-15
Payer: MEDICARE

## 2021-06-15 DIAGNOSIS — I10 ESSENTIAL HYPERTENSION: Primary | ICD-10-CM

## 2021-06-15 DIAGNOSIS — F32.0 CURRENT MILD EPISODE OF MAJOR DEPRESSIVE DISORDER, UNSPECIFIED WHETHER RECURRENT (HCC): ICD-10-CM

## 2021-06-15 PROCEDURE — G8756 NO BP MEASURE DOC: HCPCS | Performed by: FAMILY MEDICINE

## 2021-06-15 PROCEDURE — G8420 CALC BMI NORM PARAMETERS: HCPCS | Performed by: FAMILY MEDICINE

## 2021-06-15 PROCEDURE — G9899 SCRN MAM PERF RSLTS DOC: HCPCS | Performed by: FAMILY MEDICINE

## 2021-06-15 PROCEDURE — 99213 OFFICE O/P EST LOW 20 MIN: CPT | Performed by: FAMILY MEDICINE

## 2021-06-15 PROCEDURE — 3017F COLORECTAL CA SCREEN DOC REV: CPT | Performed by: FAMILY MEDICINE

## 2021-06-15 PROCEDURE — G9717 DOC PT DX DEP/BP F/U NT REQ: HCPCS | Performed by: FAMILY MEDICINE

## 2021-06-15 PROCEDURE — G8427 DOCREV CUR MEDS BY ELIG CLIN: HCPCS | Performed by: FAMILY MEDICINE

## 2021-06-15 RX ORDER — METHYLPHENIDATE HYDROCHLORIDE 5 MG/1
TABLET ORAL
Qty: 30 TABLET | Refills: 0 | Status: SHIPPED | OUTPATIENT
Start: 2021-06-15 | End: 2021-06-15 | Stop reason: RX

## 2021-06-15 RX ORDER — METHYLPHENIDATE HYDROCHLORIDE 5 MG/1
5 TABLET ORAL EVERY MORNING
Qty: 90 TABLET | Refills: 0 | Status: SHIPPED | OUTPATIENT
Start: 2021-06-15 | End: 2021-09-13 | Stop reason: SDUPTHER

## 2021-06-15 NOTE — PROGRESS NOTES
Leann Dyer is a 63 y.o. female  Chief Complaint   Patient presents with    Medication Refill     Health Maintenance Due   Topic Date Due    COVID-19 Vaccine (1) Never done    Colorectal Cancer Screening Combo  04/18/2020     There were no vitals taken for this visit. 1. Have you been to the ER, urgent care clinic since your last visit? Hospitalized since your last visit?no    2. Have you seen or consulted any other health care providers outside of the [address] [place] [address] since your last visit? Include any pap smears or colon screening.  No   .Genia Gallagher
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
## 2021-06-22 ENCOUNTER — TELEPHONE (OUTPATIENT)
Dept: INTERNAL MEDICINE CLINIC | Age: 56
End: 2021-06-22

## 2021-06-22 DIAGNOSIS — I10 ESSENTIAL HYPERTENSION: Primary | ICD-10-CM

## 2021-06-22 DIAGNOSIS — E03.9 ACQUIRED HYPOTHYROIDISM: ICD-10-CM

## 2021-06-22 NOTE — TELEPHONE ENCOUNTER
----- Message from Fernando Raygoza sent at 6/22/2021  4:03 PM EDT -----  Regarding: /telephone  Contact: [phone]  General Message/Vendor Calls    Caller's first and last name: Teresa Galan       Reason for call: He wanted to know if the order was placed for blood work and sent to [address] [place] [address] yes/no and why: Yes      Best contact number(s): [phone]      Details to clarify the request: N/A      [place]

## 2021-06-24 ENCOUNTER — TELEPHONE (OUTPATIENT)
Dept: INTERNAL MEDICINE CLINIC | Age: 56
End: 2021-06-24

## 2021-06-25 LAB
BUN SERPL-MCNC: 23 MG/DL (ref 6–24)
BUN/CREAT SERPL: 30 (ref 9–23)
CALCIUM SERPL-MCNC: 9.6 MG/DL (ref 8.7–10.2)
CHLORIDE SERPL-SCNC: 107 MMOL/L (ref 96–106)
CO2 SERPL-SCNC: 27 MMOL/L (ref 20–29)
CREAT SERPL-MCNC: 0.77 MG/DL (ref 0.57–1)
GLUCOSE SERPL-MCNC: 86 MG/DL (ref 65–99)
POTASSIUM SERPL-SCNC: 4.7 MMOL/L (ref 3.5–5.2)
SODIUM SERPL-SCNC: 145 MMOL/L (ref 134–144)
TSH SERPL DL<=0.005 MIU/L-ACNC: 2.01 UIU/ML (ref 0.45–4.5)

## 2021-07-02 DIAGNOSIS — F41.9 ANXIETY: ICD-10-CM

## 2021-07-02 DIAGNOSIS — E03.9 ACQUIRED HYPOTHYROIDISM: ICD-10-CM

## 2021-07-02 DIAGNOSIS — F32.0 CURRENT MILD EPISODE OF MAJOR DEPRESSIVE DISORDER WITHOUT PRIOR EPISODE (HCC): ICD-10-CM

## 2021-07-03 RX ORDER — LEVOTHYROXINE SODIUM 75 UG/1
TABLET ORAL
Qty: 30 TABLET | Refills: 2 | Status: SHIPPED | OUTPATIENT
Start: 2021-07-03 | End: 2021-09-29

## 2021-07-03 RX ORDER — SERTRALINE HYDROCHLORIDE 50 MG/1
TABLET, FILM COATED ORAL
Qty: 90 TABLET | Refills: 1 | Status: SHIPPED | OUTPATIENT
Start: 2021-07-03 | End: 2021-12-30

## 2021-07-03 RX ORDER — BUSPIRONE HYDROCHLORIDE 30 MG/1
TABLET ORAL
Qty: 180 TABLET | Refills: 1 | Status: SHIPPED | OUTPATIENT
Start: 2021-07-03 | End: 2022-03-17 | Stop reason: SDUPTHER

## 2021-07-09 RX ORDER — TOPIRAMATE 50 MG/1
TABLET, FILM COATED ORAL
Qty: 60 TABLET | Refills: 11 | Status: SHIPPED | OUTPATIENT
Start: 2021-07-09 | End: 2022-03-17 | Stop reason: SDUPTHER

## 2021-09-13 ENCOUNTER — TELEPHONE (OUTPATIENT)
Dept: INTERNAL MEDICINE CLINIC | Age: 56
End: 2021-09-13

## 2021-09-13 DIAGNOSIS — F32.0 CURRENT MILD EPISODE OF MAJOR DEPRESSIVE DISORDER, UNSPECIFIED WHETHER RECURRENT (HCC): ICD-10-CM

## 2021-09-13 NOTE — TELEPHONE ENCOUNTER
Patient's  calling to check status. He says that he called this in on Thursday and spoke with Guero Rodriguez but no message was sent to us, not in chart either. He is seeing if this can be put in today.  Please call him when this has been done at [phone]

## 2021-09-16 RX ORDER — METHYLPHENIDATE HYDROCHLORIDE 5 MG/1
5 TABLET ORAL EVERY MORNING
Qty: 90 TABLET | Refills: 0 | Status: SHIPPED | OUTPATIENT
Start: 2021-09-16 | End: 2021-09-22 | Stop reason: SDUPTHER

## 2021-09-16 NOTE — TELEPHONE ENCOUNTER
----- Message from Thuy Scruggs sent at 9/16/2021  3:01 PM EDT -----  Regarding: /telephone  Medication Refill    Caller (if not patient): Alexis Melvin      Relationship of caller (if not patient): spouse      Best contact number(s):[phone]        Name of medication and dosage if known: n/a 5 mg      Is patient out of this medication (yes/no): Yes      Pharmacy name:[facility]    Pharmacy listed in chart? (yes/no): n/a  Pharmacy phone number: n/a      Details to clarify the request:Prescription has not been called in over 72 hrs.       Thuy Scruggs

## 2021-09-16 NOTE — TELEPHONE ENCOUNTER
Prescription waiting and pended for Dr Edna Lauren and approval  Анна Johnson, WENDY  8/08/7571  4:52 PM

## 2021-09-17 ENCOUNTER — TELEPHONE (OUTPATIENT)
Dept: FAMILY MEDICINE CLINIC | Age: 56
End: 2021-09-17

## 2021-09-17 NOTE — TELEPHONE ENCOUNTER
----- Message from Chelle Shirley sent at 9/17/2021  3:28 PM EDT -----  Regarding: /telephone  Level 1/Escalated Issue      Caller's first and last name and relationship (if not the patient): Adia Senior -       Best contact number(s): [phone]      What are the symptoms: n/a      Transfer successful - yes/no (include outcome): no, office did not answer after 3 calls      Transfer declined - yes/no (include reason): No      Was caller advised to seek appropriate level of care - yes/no: Yes      Details to clarify the request: Out of seizure medication, has a in-home nurse, hour away from practice        Chelle Shirley

## 2021-09-17 NOTE — TELEPHONE ENCOUNTER
----- Message from [employer] sent at 9/17/2021  3:11 PM EDT -----  Regarding: /telephone  General Message/Vendor Calls    Caller's first and last name: Rosalinda Mendoza -       Reason for call: Ysabel Brady trying to contact for a week      Callback required yes/no and why: Yes      Best contact number(s):[phone]      Details to clarify the request:  Needs seizure medicine refill    Leatha De Dios

## 2021-09-20 ENCOUNTER — TELEPHONE (OUTPATIENT)
Dept: INTERNAL MEDICINE CLINIC | Age: 56
End: 2021-09-20

## 2021-09-20 DIAGNOSIS — F32.0 CURRENT MILD EPISODE OF MAJOR DEPRESSIVE DISORDER, UNSPECIFIED WHETHER RECURRENT (HCC): ICD-10-CM

## 2021-09-22 RX ORDER — METHYLPHENIDATE HYDROCHLORIDE 5 MG/1
5 TABLET ORAL EVERY MORNING
Qty: 90 TABLET | Refills: 0 | Status: SHIPPED | OUTPATIENT
Start: 2021-09-22 | End: 2021-12-22 | Stop reason: SDUPTHER

## 2021-09-22 NOTE — TELEPHONE ENCOUNTER
----- Message from April M Alexey Perez sent at 9/21/2021  5:49 PM EDT -----  Regarding: Jayden Perez MD/TELEPHONE  General Message/Vendor Calls    Caller's first and last name: Clemencia Reason        Reason for call: Requested a call back       Callback required yes/no and why: Yes       Best contact number(s): [phone]      Details to clarify the request: Mr. Krystle Perdomo stated that his wife medication methylphenidate was sent to her pharmacy and it's back ordered and unsure when they would have the medication. He found a different [facility] that does have a 30 day supply and they stated they would need a prescription for the medication from the doctor it's the [facility]  in [facility] there phone number is [phone].        April [address]co

## 2021-09-29 ENCOUNTER — TELEPHONE (OUTPATIENT)
Dept: INTERNAL MEDICINE CLINIC | Age: 56
End: 2021-09-29

## 2021-09-29 DIAGNOSIS — E03.9 ACQUIRED HYPOTHYROIDISM: ICD-10-CM

## 2021-09-29 DIAGNOSIS — F31.5 BIPOLAR AFFECTIVE DISORDER, DEPRESSED, SEVERE, WITH PSYCHOTIC BEHAVIOR (HCC): ICD-10-CM

## 2021-09-29 RX ORDER — DIVALPROEX SODIUM 250 MG/1
TABLET, DELAYED RELEASE ORAL
Qty: 180 TABLET | Refills: 1 | Status: SHIPPED | OUTPATIENT
Start: 2021-09-29 | End: 2021-12-27 | Stop reason: SDUPTHER

## 2021-09-29 RX ORDER — LEVOTHYROXINE SODIUM 75 UG/1
TABLET ORAL
Qty: 30 TABLET | Refills: 2 | Status: SHIPPED | OUTPATIENT
Start: 2021-09-29 | End: 2021-12-27 | Stop reason: SDUPTHER
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
## 2021-09-29 NOTE — TELEPHONE ENCOUNTER
Odilon Bhardwaj is calling stating that Mrs Pravin Bhat has about 14/15 steps outside her place to get inside and if doctor Ken Trent wrote a letter of medical necessity stating why it would be beneficial for her to have a ramp!      Please fax letter of recommendation to [phone]    Attn it to Odilon Wood  If any questions; 8-5pm [phone]

## 2021-10-04 NOTE — TELEPHONE ENCOUNTER
Faxed letter of recommendation for a ramp to [employer] at [phone] - confirmation of receipt received  Alejandro Lepe LPN  21/0/9190  9:36 PM

## 2021-10-07 DIAGNOSIS — F32.0 CURRENT MILD EPISODE OF MAJOR DEPRESSIVE DISORDER WITHOUT PRIOR EPISODE (HCC): ICD-10-CM

## 2021-10-07 RX ORDER — MIRTAZAPINE 15 MG/1
TABLET, FILM COATED ORAL
Qty: 90 TABLET | Refills: 1 | Status: SHIPPED | OUTPATIENT
Start: 2021-10-07 | End: 2021-12-22 | Stop reason: SDUPTHER

## 2021-12-22 DIAGNOSIS — F32.0 CURRENT MILD EPISODE OF MAJOR DEPRESSIVE DISORDER WITHOUT PRIOR EPISODE (HCC): ICD-10-CM

## 2021-12-22 RX ORDER — MIRTAZAPINE 15 MG/1
15 TABLET, FILM COATED ORAL
Qty: 90 TABLET | Refills: 1 | Status: SHIPPED | OUTPATIENT
Start: 2021-12-22 | End: 2022-03-17 | Stop reason: SDUPTHER

## 2021-12-27 ENCOUNTER — VIRTUAL VISIT (OUTPATIENT)
Dept: INTERNAL MEDICINE CLINIC | Age: 56
End: 2021-12-27
Payer: MEDICARE

## 2021-12-27 DIAGNOSIS — Z12.11 SCREENING FOR COLON CANCER: ICD-10-CM

## 2021-12-27 DIAGNOSIS — R56.9 SEIZURE (HCC): ICD-10-CM

## 2021-12-27 DIAGNOSIS — E03.9 ACQUIRED HYPOTHYROIDISM: ICD-10-CM

## 2021-12-27 DIAGNOSIS — F31.5 BIPOLAR AFFECTIVE DISORDER, DEPRESSED, SEVERE, WITH PSYCHOTIC BEHAVIOR (HCC): Primary | ICD-10-CM

## 2021-12-27 DIAGNOSIS — F32.0 CURRENT MILD EPISODE OF MAJOR DEPRESSIVE DISORDER, UNSPECIFIED WHETHER RECURRENT (HCC): ICD-10-CM

## 2021-12-27 PROCEDURE — 99214 OFFICE O/P EST MOD 30 MIN: CPT | Performed by: FAMILY MEDICINE

## 2021-12-27 PROCEDURE — 3017F COLORECTAL CA SCREEN DOC REV: CPT | Performed by: FAMILY MEDICINE

## 2021-12-27 PROCEDURE — G8420 CALC BMI NORM PARAMETERS: HCPCS | Performed by: FAMILY MEDICINE

## 2021-12-27 PROCEDURE — G8756 NO BP MEASURE DOC: HCPCS | Performed by: FAMILY MEDICINE

## 2021-12-27 PROCEDURE — G9717 DOC PT DX DEP/BP F/U NT REQ: HCPCS | Performed by: FAMILY MEDICINE

## 2021-12-27 PROCEDURE — G8427 DOCREV CUR MEDS BY ELIG CLIN: HCPCS | Performed by: FAMILY MEDICINE

## 2021-12-27 PROCEDURE — G9899 SCRN MAM PERF RSLTS DOC: HCPCS | Performed by: FAMILY MEDICINE

## 2021-12-27 RX ORDER — METHYLPHENIDATE HYDROCHLORIDE 5 MG/1
5 TABLET ORAL EVERY MORNING
Qty: 90 TABLET | Refills: 0 | Status: SHIPPED | OUTPATIENT
Start: 2021-12-27 | End: 2022-03-17 | Stop reason: SDUPTHER

## 2021-12-27 RX ORDER — LEVOTHYROXINE SODIUM 75 UG/1
75 TABLET ORAL EVERY OTHER DAY
Qty: 30 TABLET | Refills: 2 | Status: SHIPPED | OUTPATIENT
Start: 2021-12-27 | End: 2021-12-30

## 2021-12-27 RX ORDER — DIVALPROEX SODIUM 250 MG/1
250 TABLET, DELAYED RELEASE ORAL 2 TIMES DAILY
Qty: 180 TABLET | Refills: 1 | Status: SHIPPED | OUTPATIENT
Start: 2021-12-27 | End: 2022-03-17 | Stop reason: SDUPTHER

## 2021-12-27 RX ORDER — LEVOTHYROXINE SODIUM 88 UG/1
88 TABLET ORAL EVERY OTHER DAY
Qty: 30 TABLET | Refills: 5 | Status: SHIPPED | OUTPATIENT
Start: 2021-12-27 | End: 2022-07-01 | Stop reason: SDUPTHER

## 2021-12-27 RX ORDER — DIVALPROEX SODIUM 125 MG/1
125 TABLET, DELAYED RELEASE ORAL 2 TIMES DAILY
Qty: 180 TABLET | Refills: 1 | Status: SHIPPED | OUTPATIENT
Start: 2021-12-27 | End: 2022-03-17 | Stop reason: SDUPTHER
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
## 2021-12-27 NOTE — PROGRESS NOTES
Chief Complaint   Patient presents with    Medication Refill     Patient is aware that this is a Virtual Visit or Phone Call Only doctor's visit. Patient has not been out of the country in (14 months), NO diarrhea, NO cough, NO chest conjestion, NO temp. Pt has not been around anyone with these symptoms. Health Maintenance reviewed. I have reviewed the patient's medical history in detail and updated the computerized patient record. 1. Have you been to the ER, urgent care clinic since your last visit? No  Hospitalized since your last visit? No     2. Have you seen or consulted any other health care providers outside of the [address] [place] [address] since your last visit? No Include any pap smears or colon screening. Encouraged pt to discuss pt's wishes with spouse/partner/family and bring them in the next appt to follow thru with the Advanced Directive      Fall Risk Assessment, last 12 mths 5/7/2021   Able to walk? Yes   Fall in past 12 months? 1   Do you feel unsteady? -   Are you worried about falling 1   Is TUG test greater than 12 seconds? -   Is the gait abnormal? 1   Number of falls in past 12 months 2   Fall with injury? 1       3 most recent PHQ Screens 12/27/2021   PHQ Not Done -   Little interest or pleasure in doing things Several days   Feeling down, depressed, irritable, or hopeless Several days   Total Score PHQ 2 2       Abuse Screening Questionnaire 5/7/2021   Do you ever feel afraid of your partner? N   Are you in a relationship with someone who physically or mentally threatens you? N   Is it safe for you to go home?  Y       ADL Assessment 5/7/2021   Feeding yourself No Help Needed   Getting from bed to chair No Help Needed   Getting dressed Help Needed   Bathing or showering Help Needed   Walk across the room (includes cane/walker) Help Needed   Using the telphone Help Needed   Taking your medications Help Needed   Preparing meals Help Needed   Managing money (expenses/bills) Help Needed   Moderately strenuous housework (laundry) Help Needed   Shopping for personal items (toiletries/medicines) Help Needed   Shopping for groceries Help Needed   Driving Help Needed   Climbing a flight of stairs Help Needed   Getting to places beyond walking distances Help Needed

## 2021-12-30 DIAGNOSIS — E03.9 ACQUIRED HYPOTHYROIDISM: ICD-10-CM

## 2021-12-30 DIAGNOSIS — F32.0 CURRENT MILD EPISODE OF MAJOR DEPRESSIVE DISORDER WITHOUT PRIOR EPISODE (HCC): ICD-10-CM

## 2021-12-30 RX ORDER — LEVOTHYROXINE SODIUM 75 UG/1
TABLET ORAL
Qty: 30 TABLET | Refills: 2 | Status: SHIPPED | OUTPATIENT
Start: 2021-12-30 | End: 2022-03-17 | Stop reason: SDUPTHER

## 2021-12-30 RX ORDER — SERTRALINE HYDROCHLORIDE 50 MG/1
TABLET, FILM COATED ORAL
Qty: 90 TABLET | Refills: 1 | Status: SHIPPED | OUTPATIENT
Start: 2021-12-30 | End: 2022-02-04 | Stop reason: ALTCHOICE

## 2022-01-28 ENCOUNTER — PATIENT MESSAGE (OUTPATIENT)
Dept: INTERNAL MEDICINE CLINIC | Age: 57
End: 2022-01-28

## 2022-01-28 DIAGNOSIS — R19.5 POSITIVE COLORECTAL CANCER SCREENING USING COLOGUARD TEST: Primary | ICD-10-CM

## 2022-01-31 ENCOUNTER — DOCUMENTATION ONLY (OUTPATIENT)
Dept: INTERNAL MEDICINE CLINIC | Age: 57
End: 2022-01-31

## 2022-02-04 ENCOUNTER — VIRTUAL VISIT (OUTPATIENT)
Dept: INTERNAL MEDICINE CLINIC | Age: 57
End: 2022-02-04
Payer: MEDICARE

## 2022-02-04 DIAGNOSIS — R56.9 SEIZURE (HCC): ICD-10-CM

## 2022-02-04 DIAGNOSIS — F32.0 CURRENT MILD EPISODE OF MAJOR DEPRESSIVE DISORDER, UNSPECIFIED WHETHER RECURRENT (HCC): ICD-10-CM

## 2022-02-04 DIAGNOSIS — R19.5 POSITIVE COLORECTAL CANCER SCREENING USING COLOGUARD TEST: Primary | ICD-10-CM

## 2022-02-04 DIAGNOSIS — E03.9 ACQUIRED HYPOTHYROIDISM: ICD-10-CM

## 2022-02-04 PROCEDURE — G8420 CALC BMI NORM PARAMETERS: HCPCS | Performed by: FAMILY MEDICINE

## 2022-02-04 PROCEDURE — 99213 OFFICE O/P EST LOW 20 MIN: CPT | Performed by: FAMILY MEDICINE

## 2022-02-04 PROCEDURE — G8427 DOCREV CUR MEDS BY ELIG CLIN: HCPCS | Performed by: FAMILY MEDICINE

## 2022-02-04 PROCEDURE — G9717 DOC PT DX DEP/BP F/U NT REQ: HCPCS | Performed by: FAMILY MEDICINE

## 2022-02-04 PROCEDURE — G9899 SCRN MAM PERF RSLTS DOC: HCPCS | Performed by: FAMILY MEDICINE

## 2022-02-04 PROCEDURE — 3017F COLORECTAL CA SCREEN DOC REV: CPT | Performed by: FAMILY MEDICINE

## 2022-02-04 PROCEDURE — G8756 NO BP MEASURE DOC: HCPCS | Performed by: FAMILY MEDICINE

## 2022-02-04 NOTE — PROGRESS NOTES
Chief Complaint   Patient presents with    Bipolar     FU     Patient is aware that this is a Virtual Visit or Phone Call Only doctor's visit. Patient has not been out of the country in (14 months), NO diarrhea, NO cough, NO chest conjestion, NO temp. Pt has not been around anyone with these symptoms. Health Maintenance reviewed. I have reviewed the patient's medical history in detail and updated the computerized patient record. 1. Have you been to the ER, urgent care clinic since your last visit? no Hospitalized since your last visit?  no    2. Have you seen or consulted any other health care providers outside of the [address] [place] [address] since your last visit? No Include any pap smears or colon screening. Encouraged pt to discuss pt's wishes with spouse/partner/family and bring them in the next appt to follow thru with the Advanced Directive      Fall Risk Assessment, last 12 mths 5/7/2021   Able to walk? Yes   Fall in past 12 months? 1   Do you feel unsteady? -   Are you worried about falling 1   Is TUG test greater than 12 seconds? -   Is the gait abnormal? 1   Number of falls in past 12 months 2   Fall with injury? 1       3 most recent PHQ Screens 2/4/2022   PHQ Not Done -   Little interest or pleasure in doing things Several days   Feeling down, depressed, irritable, or hopeless Several days   Total Score PHQ 2 2       Abuse Screening Questionnaire 5/7/2021   Do you ever feel afraid of your partner? N   Are you in a relationship with someone who physically or mentally threatens you? N   Is it safe for you to go home?  Y       ADL Assessment 5/7/2021   Feeding yourself No Help Needed   Getting from bed to chair No Help Needed   Getting dressed Help Needed   Bathing or showering Help Needed   Walk across the room (includes cane/walker) Help Needed   Using the telphone Help Needed   Taking your medications Help Needed   Preparing meals Help Needed   Managing money (expenses/bills) Help Needed   Moderately strenuous housework (laundry) Help Needed   Shopping for personal items (toiletries/medicines) Help Needed   Shopping for groceries Help Needed   Driving Help Needed   Climbing a flight of stairs Help Needed   Getting to places beyond walking distances Help Needed

## 2022-02-04 NOTE — PROGRESS NOTES
Jatinder Schmitt is a 61 y.o. female who was phone evaluated on 2/4/2022. Consent:  She and/or her healthcare decision maker is aware that this patient-initiated Telehealth encounter is a billable service, with coverage as determined by her insurance carrier. She is aware that she may receive a bill and has provided verbal consent to proceed: Yes    I was in the office while conducting this encounter. Assessment & Plan:       ICD-10-CM ICD-9-CM    1. Positive colorectal cancer screening using Cologuard test  R19.5 787.7    2. Acquired hypothyroidism  E03.9 244.9    3. Seizure ([facility]  R56.9 780.39    4. Current mild episode of major depressive disorder, unspecified whether recurrent ([facility]  F32.0 296.21      Orders Placed This Encounter    METABOLIC PANEL, COMPREHENSIVE     Standing Status:   Future     Number of Occurrences:   1     Standing Expiration Date:   8/7/2022    TSH 3RD GENERATION     Standing Status:   Future     Number of Occurrences:   1     Standing Expiration Date:   8/6/2022     Routine labs ordered  Continue current medications    Current Outpatient Medications   Medication Sig Dispense Refill    levothyroxine (SYNTHROID) 75 mcg tablet ALTERNATE DAYS WITH 88 MCG TABLET 30 Tablet 2    levothyroxine (SYNTHROID) 88 mcg tablet Take 1 Tablet by mouth every other day. 30 Tablet 5    methylphenidate HCl (RITALIN) 5 mg tablet Take 1 Tablet by mouth Every morning. Max Daily Amount: 5 mg. 90 Tablet 0    divalproex DR (DEPAKOTE) 250 mg tablet Take 1 Tablet by mouth two (2) times a day. 180 Tablet 1    divalproex DR (DEPAKOTE) 125 mg tablet Take 1 Tablet by mouth two (2) times a day. 180 Tablet 1    mirtazapine (REMERON) 15 mg tablet Take 1 Tablet by mouth nightly.  90 Tablet 1    topiramate (TOPAMAX) 50 mg tablet TAKE 1 TABLET BY MOUTH TWICE DAILY 60 Tablet 11    busPIRone (BUSPAR) 30 mg tablet TAKE 1 TABLET BY MOUTH TWICE DAILY 180 Tablet 1    elderberry fruit and flower 460-115 mg cap Take  by mouth.      red yeast rice extract 600 mg cap Take 600 mg by mouth now.  krill oil 500 mg cap Take  by mouth. 15 minutes spent      712  Subjective:       there, we discussed her + cologuard. She is not there. GI is going to discuss with them. Given her chronic illnesses anesthesia would be a risk for her. We discussed the expected course, resolution and complications of the diagnosis(es) in detail. Medication risks, benefits, costs, interactions, and alternatives were discussed as indicated. I advised her to contact the office if her condition worsens, changes or fails to improve as anticipated. She expressed understanding with the diagnosis(es) and plan. Pursuant to the emergency declaration under the [facility] waiver authority and the [employer] General Act, this Virtual  Visit was conducted, with patient's consent, to reduce the patient's risk of exposure to COVID-19 and provide continuity of care for an established patient. Services were provided through a video synchronous discussion virtually to substitute for in-person clinic visit.     Santos Ron MD

## 2022-02-04 NOTE — LETTER
2/4/2022 3:34 PM    Ms. [address] [place] [id number]      Dear Ms. Rose Chris Jimenez you will find your next Virtual Visit and an order for labs. If you have any questions, please do not hesitate to contact this office.         Sincerely,      Frank Raphael MD

## 2022-02-17 LAB
ALBUMIN SERPL-MCNC: 4.4 G/DL (ref 3.8–4.9)
ALBUMIN/GLOB SERPL: 2 {RATIO} (ref 1.2–2.2)
ALP SERPL-CCNC: 66 IU/L (ref 44–121)
ALT SERPL-CCNC: 11 IU/L (ref 0–32)
AST SERPL-CCNC: 15 IU/L (ref 0–40)
BILIRUB SERPL-MCNC: 0.2 MG/DL (ref 0–1.2)
BUN SERPL-MCNC: 22 MG/DL (ref 6–24)
BUN/CREAT SERPL: 28 (ref 9–23)
CALCIUM SERPL-MCNC: 9.7 MG/DL (ref 8.7–10.2)
CHLORIDE SERPL-SCNC: 108 MMOL/L (ref 96–106)
CO2 SERPL-SCNC: 22 MMOL/L (ref 20–29)
CREAT SERPL-MCNC: 0.79 MG/DL (ref 0.57–1)
GLOBULIN SER CALC-MCNC: 2.2 G/DL (ref 1.5–4.5)
GLUCOSE SERPL-MCNC: 85 MG/DL (ref 65–99)
POTASSIUM SERPL-SCNC: 4.6 MMOL/L (ref 3.5–5.2)
PROT SERPL-MCNC: 6.6 G/DL (ref 6–8.5)
SODIUM SERPL-SCNC: 146 MMOL/L (ref 134–144)
TSH SERPL DL<=0.005 MIU/L-ACNC: 3.2 UIU/ML (ref 0.45–4.5)

## 2022-02-18 ENCOUNTER — VIRTUAL VISIT (OUTPATIENT)
Dept: INTERNAL MEDICINE CLINIC | Age: 57
End: 2022-02-18
Payer: MEDICARE

## 2022-02-18 DIAGNOSIS — G40.409 TONIC SEIZURE DISORDER (HCC): ICD-10-CM

## 2022-02-18 DIAGNOSIS — E03.9 ACQUIRED HYPOTHYROIDISM: ICD-10-CM

## 2022-02-18 DIAGNOSIS — I10 ESSENTIAL HYPERTENSION: Primary | ICD-10-CM

## 2022-02-18 PROCEDURE — G9717 DOC PT DX DEP/BP F/U NT REQ: HCPCS | Performed by: FAMILY MEDICINE

## 2022-02-18 PROCEDURE — 3017F COLORECTAL CA SCREEN DOC REV: CPT | Performed by: FAMILY MEDICINE

## 2022-02-18 PROCEDURE — G9899 SCRN MAM PERF RSLTS DOC: HCPCS | Performed by: FAMILY MEDICINE

## 2022-02-18 PROCEDURE — G8756 NO BP MEASURE DOC: HCPCS | Performed by: FAMILY MEDICINE

## 2022-02-18 PROCEDURE — 99213 OFFICE O/P EST LOW 20 MIN: CPT | Performed by: FAMILY MEDICINE

## 2022-02-18 PROCEDURE — G8421 BMI NOT CALCULATED: HCPCS | Performed by: FAMILY MEDICINE

## 2022-02-18 PROCEDURE — G8427 DOCREV CUR MEDS BY ELIG CLIN: HCPCS | Performed by: FAMILY MEDICINE

## 2022-02-18 NOTE — PROGRESS NOTES
Chief Complaint   Patient presents with    Labs     Patient is aware that this is a Virtual Visit or Phone Call Only doctor's visit. Patient has not been out of the country in (14 months), NO diarrhea, NO cough, NO chest conjestion, NO temp. Pt has not been around anyone with these symptoms. Health Maintenance reviewed. I have reviewed the patient's medical history in detail and updated the computerized patient record. 1. Have you been to the ER, urgent care clinic since your last visit? no Hospitalized since your last visit?  no    2. Have you seen or consulted any other health care providers outside of the [address] [place] [address] since your last visit? no  Include any pap smears or colon screening. Encouraged pt to discuss pt's wishes with spouse/partner/family and bring them in the next appt to follow thru with the Advanced Directive      Fall Risk Assessment, last 12 mths 5/7/2021   Able to walk? Yes   Fall in past 12 months? 1   Do you feel unsteady? -   Are you worried about falling 1   Is TUG test greater than 12 seconds? -   Is the gait abnormal? 1   Number of falls in past 12 months 2   Fall with injury? 1       3 most recent PHQ Screens 2/4/2022   PHQ Not Done -   Little interest or pleasure in doing things Several days   Feeling down, depressed, irritable, or hopeless Several days   Total Score PHQ 2 2       Abuse Screening Questionnaire 5/7/2021   Do you ever feel afraid of your partner? N   Are you in a relationship with someone who physically or mentally threatens you? N   Is it safe for you to go home?  Y       ADL Assessment 5/7/2021   Feeding yourself No Help Needed   Getting from bed to chair No Help Needed   Getting dressed Help Needed   Bathing or showering Help Needed   Walk across the room (includes cane/walker) Help Needed   Using the telphone Help Needed   Taking your medications Help Needed   Preparing meals Help Needed   Managing money (expenses/bills) Help Needed Moderately strenuous housework (laundry) Help Needed   Shopping for personal items (toiletries/medicines) Help Needed   Shopping for groceries Help Needed   Driving Help Needed   Climbing a flight of stairs Help Needed   Getting to places beyond walking distances Help Needed

## 2022-02-20 NOTE — PROGRESS NOTES
Ahmet Forrest is a 61 y.o. female who was phone evaluated on 2/18/2022. Consent:  She and/or her healthcare decision maker is aware that this patient-initiated Telehealth encounter is a billable service, with coverage as determined by her insurance carrier. She is aware that she may receive a bill and has provided verbal consent to proceed: Yes    I was in the office while conducting this encounter. We discussed increasing her water intake and repeating her labs next month. 12 minutes spent discussing this with her    Assessment & Plan:       ICD-10-CM ICD-9-CM    1. Essential hypertension  U72 838.2 METABOLIC PANEL, BASIC      METABOLIC PANEL, BASIC   2. Tonic seizure disorder (HCC)  G40.409 345.10    3. Acquired hypothyroidism  E03.9 244.9          Orders Placed This Encounter    METABOLIC PANEL, BASIC     Standing Status:   Future     Number of Occurrences:   1     Standing Expiration Date:   8/21/2022           712  Subjective:      Patient called wanting to discuss her mild hypernatremia hyperchloride anemia and elevated BUN to creatinine ratio. Discussed how this is most likely from her not drinking enough water. Has been think she does but she says she does not although she tries. Currently not on any diuretic. Mood is been okay, no seizures. Current Outpatient Medications   Medication Sig Dispense Refill    levothyroxine (SYNTHROID) 75 mcg tablet ALTERNATE DAYS WITH 88 MCG TABLET 30 Tablet 2    levothyroxine (SYNTHROID) 88 mcg tablet Take 1 Tablet by mouth every other day. 30 Tablet 5    methylphenidate HCl (RITALIN) 5 mg tablet Take 1 Tablet by mouth Every morning. Max Daily Amount: 5 mg. 90 Tablet 0    divalproex DR (DEPAKOTE) 250 mg tablet Take 1 Tablet by mouth two (2) times a day. 180 Tablet 1    divalproex DR (DEPAKOTE) 125 mg tablet Take 1 Tablet by mouth two (2) times a day. 180 Tablet 1    mirtazapine (REMERON) 15 mg tablet Take 1 Tablet by mouth nightly.  90 Tablet 1    topiramate (TOPAMAX) 50 mg tablet TAKE 1 TABLET BY MOUTH TWICE DAILY 60 Tablet 11    busPIRone (BUSPAR) 30 mg tablet TAKE 1 TABLET BY MOUTH TWICE DAILY 180 Tablet 1    elderberry fruit and flower 460-115 mg cap Take  by mouth.  red yeast rice extract 600 mg cap Take 600 mg by mouth now.  krill oil 500 mg cap Take  by mouth. Past Medical History:   Diagnosis Date    Allergic rhinitis     Anxiety     Bipolar disorder ([address]     [id number]    Depression     DX 2014-15/ [address] [facility] Faiza Flores - counselor    Environmental allergies     High cholesterol     Hypertension     > 10 years    Lichen planus     Located Mouth/Dentist/ DX 5 years / Dr. Patsy Gr, [facility].  Migraine     2014 - 2015    PTSD (post-traumatic stress disorder)     2014 - 2015    Tendonitis     Both feet/    Thyroid disease     20 year ago    TIA (transient ischemic attack)      NAD      We discussed the expected course, resolution and complications of the diagnosis(es) in detail. Medication risks, benefits, costs, interactions, and alternatives were discussed as indicated. I advised her to contact the office if her condition worsens, changes or fails to improve as anticipated. She expressed understanding with the diagnosis(es) and plan. Pursuant to the emergency declaration under the [facility], [facility] waiver authority and the [employer] General Act, this Virtual  Visit was conducted, with patient's consent, to reduce the patient's risk of exposure to COVID-19 and provide continuity of care for an established patient. Services were provided through a video synchronous discussion virtually to substitute for in-person clinic visit.     Olga Winkler MD

## 2022-03-17 ENCOUNTER — VIRTUAL VISIT (OUTPATIENT)
Dept: INTERNAL MEDICINE CLINIC | Age: 57
End: 2022-03-17
Payer: MEDICARE

## 2022-03-17 DIAGNOSIS — F32.0 CURRENT MILD EPISODE OF MAJOR DEPRESSIVE DISORDER, UNSPECIFIED WHETHER RECURRENT (HCC): ICD-10-CM

## 2022-03-17 DIAGNOSIS — F41.9 ANXIETY: ICD-10-CM

## 2022-03-17 DIAGNOSIS — R56.9 SEIZURE (HCC): ICD-10-CM

## 2022-03-17 DIAGNOSIS — F32.0 CURRENT MILD EPISODE OF MAJOR DEPRESSIVE DISORDER WITHOUT PRIOR EPISODE (HCC): ICD-10-CM

## 2022-03-17 DIAGNOSIS — E03.9 ACQUIRED HYPOTHYROIDISM: ICD-10-CM

## 2022-03-17 PROCEDURE — G8427 DOCREV CUR MEDS BY ELIG CLIN: HCPCS | Performed by: FAMILY MEDICINE

## 2022-03-17 PROCEDURE — G8421 BMI NOT CALCULATED: HCPCS | Performed by: FAMILY MEDICINE

## 2022-03-17 PROCEDURE — 99214 OFFICE O/P EST MOD 30 MIN: CPT | Performed by: FAMILY MEDICINE

## 2022-03-17 PROCEDURE — G9717 DOC PT DX DEP/BP F/U NT REQ: HCPCS | Performed by: FAMILY MEDICINE

## 2022-03-17 PROCEDURE — G8756 NO BP MEASURE DOC: HCPCS | Performed by: FAMILY MEDICINE

## 2022-03-17 PROCEDURE — 3017F COLORECTAL CA SCREEN DOC REV: CPT | Performed by: FAMILY MEDICINE

## 2022-03-17 PROCEDURE — G9899 SCRN MAM PERF RSLTS DOC: HCPCS | Performed by: FAMILY MEDICINE

## 2022-03-17 RX ORDER — LEVOTHYROXINE SODIUM 75 UG/1
TABLET ORAL
Qty: 30 TABLET | Refills: 2 | Status: SHIPPED | OUTPATIENT
Start: 2022-03-17 | End: 2022-07-01 | Stop reason: SDUPTHER

## 2022-03-17 RX ORDER — TOPIRAMATE 50 MG/1
50 TABLET, FILM COATED ORAL 2 TIMES DAILY
Qty: 180 TABLET | Refills: 1 | Status: SHIPPED | OUTPATIENT
Start: 2022-03-17 | End: 2022-03-31 | Stop reason: SDUPTHER

## 2022-03-17 RX ORDER — DIVALPROEX SODIUM 125 MG/1
125 TABLET, DELAYED RELEASE ORAL 2 TIMES DAILY
Qty: 180 TABLET | Refills: 1 | Status: SHIPPED | OUTPATIENT
Start: 2022-03-17 | End: 2022-03-31 | Stop reason: SDUPTHER

## 2022-03-17 RX ORDER — MIRTAZAPINE 15 MG/1
15 TABLET, FILM COATED ORAL
Qty: 90 TABLET | Refills: 1 | Status: SHIPPED | OUTPATIENT
Start: 2022-03-17 | End: 2022-07-01 | Stop reason: SDUPTHER

## 2022-03-17 RX ORDER — BUSPIRONE HYDROCHLORIDE 30 MG/1
30 TABLET ORAL 2 TIMES DAILY
Qty: 180 TABLET | Refills: 1 | Status: SHIPPED | OUTPATIENT
Start: 2022-03-17 | End: 2022-07-01 | Stop reason: SDUPTHER

## 2022-03-17 RX ORDER — DIVALPROEX SODIUM 250 MG/1
250 TABLET, DELAYED RELEASE ORAL 2 TIMES DAILY
Qty: 180 TABLET | Refills: 1 | Status: SHIPPED | OUTPATIENT
Start: 2022-03-17 | End: 2022-03-31 | Stop reason: SDUPTHER

## 2022-03-17 RX ORDER — METHYLPHENIDATE HYDROCHLORIDE 5 MG/1
5 TABLET ORAL EVERY MORNING
Qty: 90 TABLET | Refills: 0 | Status: SHIPPED | OUTPATIENT
Start: 2022-03-17 | End: 2022-07-01 | Stop reason: SDUPTHER
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
## 2022-03-17 NOTE — PROGRESS NOTES
Chief Complaint   Patient presents with    Hypertension     3 mth FU     Patient is aware that this is a Virtual Visit or Phone Call Only doctor's visit. Patient has not been out of the country in (14 months), NO diarrhea, NO cough, NO chest conjestion, NO temp. Pt has not been around anyone with these symptoms. Health Maintenance reviewed. I have reviewed the patient's medical history in detail and updated the computerized patient record. 1. Have you been to the ER, urgent care clinic since your last visit? no  Hospitalized since your last visit?  no    2. Have you seen or consulted any other health care providers outside of the [address] [place] [address] since your last visit? No  Include any pap smears or colon screening. Encouraged pt to discuss pt's wishes with spouse/partner/family and bring them in the next appt to follow thru with the Advanced Directive      Fall Risk Assessment, last 12 mths 5/7/2021   Able to walk? Yes   Fall in past 12 months? 1   Do you feel unsteady? -   Are you worried about falling 1   Is TUG test greater than 12 seconds? -   Is the gait abnormal? 1   Number of falls in past 12 months 2   Fall with injury? 1       3 most recent PHQ Screens 3/17/2022   PHQ Not Done -   Little interest or pleasure in doing things Several days   Feeling down, depressed, irritable, or hopeless Several days   Total Score PHQ 2 2       Abuse Screening Questionnaire 5/7/2021   Do you ever feel afraid of your partner? N   Are you in a relationship with someone who physically or mentally threatens you? N   Is it safe for you to go home?  Y       ADL Assessment 5/7/2021   Feeding yourself No Help Needed   Getting from bed to chair No Help Needed   Getting dressed Help Needed   Bathing or showering Help Needed   Walk across the room (includes cane/walker) Help Needed   Using the telphone Help Needed   Taking your medications Help Needed   Preparing meals Help Needed   Managing money (expenses/bills) Help Needed   Moderately strenuous housework (laundry) Help Needed   Shopping for personal items (toiletries/medicines) Help Needed   Shopping for groceries Help Needed   Driving Help Needed   Climbing a flight of stairs Help Needed   Getting to places beyond walking distances Help Needed

## 2022-03-18 PROBLEM — G47.00 INSOMNIA DISORDER WITH NON-SLEEP DISORDER MENTAL COMORBIDITY: Status: ACTIVE | Noted: 2019-03-12

## 2022-03-18 PROBLEM — E78.00 HIGH CHOLESTEROL: Status: ACTIVE | Noted: 2018-02-02

## 2022-03-19 PROBLEM — I10 ESSENTIAL HYPERTENSION: Status: ACTIVE | Noted: 2018-02-02

## 2022-03-19 PROBLEM — G40.409: Status: ACTIVE | Noted: 2019-12-19

## 2022-03-19 PROBLEM — F43.10 PTSD (POST-TRAUMATIC STRESS DISORDER): Status: ACTIVE | Noted: 2018-12-20

## 2022-03-19 PROBLEM — F31.9 AFFECTIVE PSYCHOSIS, BIPOLAR (HCC): Status: ACTIVE | Noted: 2019-07-31

## 2022-03-19 PROBLEM — F32.A DEPRESSION: Status: ACTIVE | Noted: 2019-08-01

## 2022-03-19 PROBLEM — E03.9 ACQUIRED HYPOTHYROIDISM: Status: ACTIVE | Noted: 2017-12-22

## 2022-03-19 PROBLEM — R25.1 TREMOR: Status: ACTIVE | Noted: 2018-05-31

## 2022-03-19 PROBLEM — F31.5 BIPOLAR AFFECTIVE DISORDER, DEPRESSED, SEVERE, WITH PSYCHOTIC BEHAVIOR (HCC): Status: ACTIVE | Noted: 2018-12-20

## 2022-03-19 PROBLEM — F41.1 GAD (GENERALIZED ANXIETY DISORDER): Status: ACTIVE | Noted: 2018-12-20
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
## 2022-03-31 ENCOUNTER — VIRTUAL VISIT (OUTPATIENT)
Dept: NEUROLOGY | Age: 57
End: 2022-03-31
Payer: MEDICARE

## 2022-03-31 DIAGNOSIS — R56.9 SEIZURE (HCC): Primary | ICD-10-CM

## 2022-03-31 DIAGNOSIS — G25.0 ESSENTIAL TREMOR: ICD-10-CM

## 2022-03-31 PROCEDURE — G9717 DOC PT DX DEP/BP F/U NT REQ: HCPCS | Performed by: NURSE PRACTITIONER

## 2022-03-31 PROCEDURE — 3017F COLORECTAL CA SCREEN DOC REV: CPT | Performed by: NURSE PRACTITIONER

## 2022-03-31 PROCEDURE — G9899 SCRN MAM PERF RSLTS DOC: HCPCS | Performed by: NURSE PRACTITIONER

## 2022-03-31 PROCEDURE — G8756 NO BP MEASURE DOC: HCPCS | Performed by: NURSE PRACTITIONER

## 2022-03-31 PROCEDURE — G8427 DOCREV CUR MEDS BY ELIG CLIN: HCPCS | Performed by: NURSE PRACTITIONER

## 2022-03-31 PROCEDURE — 99214 OFFICE O/P EST MOD 30 MIN: CPT | Performed by: NURSE PRACTITIONER

## 2022-03-31 RX ORDER — DIVALPROEX SODIUM 250 MG/1
250 TABLET, DELAYED RELEASE ORAL 2 TIMES DAILY
Qty: 180 TABLET | Refills: 1 | Status: SHIPPED | OUTPATIENT
Start: 2022-03-31 | End: 2022-07-01 | Stop reason: SDUPTHER

## 2022-03-31 RX ORDER — DIVALPROEX SODIUM 125 MG/1
125 TABLET, DELAYED RELEASE ORAL 2 TIMES DAILY
Qty: 180 TABLET | Refills: 1 | Status: SHIPPED | OUTPATIENT
Start: 2022-03-31 | End: 2022-07-01 | Stop reason: SDUPTHER

## 2022-03-31 RX ORDER — TOPIRAMATE 50 MG/1
50 TABLET, FILM COATED ORAL 2 TIMES DAILY
Qty: 180 TABLET | Refills: 1 | Status: SHIPPED | OUTPATIENT
Start: 2022-03-31 | End: 2022-07-01 | Stop reason: SDUPTHER
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
## 2022-05-30 RX ORDER — CHOLECALCIFEROL TAB 125 MCG (5000 UNIT) 125 MCG
5000 TAB ORAL DAILY
COMMUNITY

## 2022-06-01 ENCOUNTER — ANESTHESIA EVENT (OUTPATIENT)
Dept: ENDOSCOPY | Age: 57
End: 2022-06-01
Payer: COMMERCIAL

## 2022-06-01 ENCOUNTER — ANESTHESIA (OUTPATIENT)
Dept: ENDOSCOPY | Age: 57
End: 2022-06-01
Payer: COMMERCIAL

## 2022-06-01 ENCOUNTER — HOSPITAL ENCOUNTER (OUTPATIENT)
Age: 57
Setting detail: OUTPATIENT SURGERY
Discharge: HOME | End: 2022-06-01
Attending: INTERNAL MEDICINE | Admitting: INTERNAL MEDICINE
Payer: COMMERCIAL

## 2022-06-01 VITALS
RESPIRATION RATE: 17 BRPM | WEIGHT: 164.9 LBS | HEART RATE: 58 BPM | BODY MASS INDEX: 29.22 KG/M2 | SYSTOLIC BLOOD PRESSURE: 122 MMHG | HEIGHT: 63 IN | DIASTOLIC BLOOD PRESSURE: 65 MMHG | OXYGEN SATURATION: 99 % | TEMPERATURE: 97.6 F

## 2022-06-01 PROCEDURE — 74011250636 HC RX REV CODE- 250/636: Performed by: NURSE ANESTHETIST, CERTIFIED REGISTERED

## 2022-06-01 PROCEDURE — 76060000031 HC ANESTHESIA FIRST 0.5 HR: Performed by: INTERNAL MEDICINE

## 2022-06-01 PROCEDURE — 2709999900 HC NON-CHARGEABLE SUPPLY: Performed by: INTERNAL MEDICINE

## 2022-06-01 PROCEDURE — 74011000250 HC RX REV CODE- 250: Performed by: NURSE ANESTHETIST, CERTIFIED REGISTERED

## 2022-06-01 PROCEDURE — 76040000019: Performed by: INTERNAL MEDICINE

## 2022-06-01 RX ORDER — SODIUM CHLORIDE 9 MG/ML
INJECTION, SOLUTION INTRAVENOUS
Status: DISCONTINUED | OUTPATIENT
Start: 2022-06-01 | End: 2022-06-01 | Stop reason: HOSPADM

## 2022-06-01 RX ORDER — MIDAZOLAM HYDROCHLORIDE 1 MG/ML
.25-5 INJECTION, SOLUTION INTRAMUSCULAR; INTRAVENOUS
Status: DISCONTINUED | OUTPATIENT
Start: 2022-06-01 | End: 2022-06-01 | Stop reason: HOSPADM

## 2022-06-01 RX ORDER — FENTANYL CITRATE 50 UG/ML
100 INJECTION, SOLUTION INTRAMUSCULAR; INTRAVENOUS
Status: DISCONTINUED | OUTPATIENT
Start: 2022-06-01 | End: 2022-06-01 | Stop reason: HOSPADM

## 2022-06-01 RX ORDER — DEXTROMETHORPHAN/PSEUDOEPHED 2.5-7.5/.8
1.2 DROPS ORAL
Status: DISCONTINUED | OUTPATIENT
Start: 2022-06-01 | End: 2022-06-01 | Stop reason: HOSPADM

## 2022-06-01 RX ORDER — ATROPINE SULFATE 0.1 MG/ML
0.5 INJECTION INTRAVENOUS
Status: DISCONTINUED | OUTPATIENT
Start: 2022-06-01 | End: 2022-06-01 | Stop reason: HOSPADM

## 2022-06-01 RX ORDER — SODIUM CHLORIDE 9 MG/ML
50 INJECTION, SOLUTION INTRAVENOUS CONTINUOUS
Status: DISCONTINUED | OUTPATIENT
Start: 2022-06-01 | End: 2022-06-01 | Stop reason: HOSPADM

## 2022-06-01 RX ORDER — FLUMAZENIL 0.1 MG/ML
0.2 INJECTION INTRAVENOUS
Status: DISCONTINUED | OUTPATIENT
Start: 2022-06-01 | End: 2022-06-01 | Stop reason: HOSPADM

## 2022-06-01 RX ORDER — PROPOFOL 10 MG/ML
INJECTION, EMULSION INTRAVENOUS AS NEEDED
Status: DISCONTINUED | OUTPATIENT
Start: 2022-06-01 | End: 2022-06-01 | Stop reason: HOSPADM

## 2022-06-01 RX ORDER — NALOXONE HYDROCHLORIDE 0.4 MG/ML
0.4 INJECTION, SOLUTION INTRAMUSCULAR; INTRAVENOUS; SUBCUTANEOUS
Status: DISCONTINUED | OUTPATIENT
Start: 2022-06-01 | End: 2022-06-01 | Stop reason: HOSPADM

## 2022-06-01 RX ORDER — EPINEPHRINE 0.1 MG/ML
1 INJECTION INTRACARDIAC; INTRAVENOUS
Status: DISCONTINUED | OUTPATIENT
Start: 2022-06-01 | End: 2022-06-01 | Stop reason: HOSPADM

## 2022-06-01 RX ORDER — LIDOCAINE HYDROCHLORIDE 20 MG/ML
INJECTION, SOLUTION EPIDURAL; INFILTRATION; INTRACAUDAL; PERINEURAL AS NEEDED
Status: DISCONTINUED | OUTPATIENT
Start: 2022-06-01 | End: 2022-06-01 | Stop reason: HOSPADM

## 2022-06-01 RX ADMIN — PROPOFOL 30 MG: 10 INJECTION, EMULSION INTRAVENOUS at 13:01

## 2022-06-01 RX ADMIN — PROPOFOL 30 MG: 10 INJECTION, EMULSION INTRAVENOUS at 13:14

## 2022-06-01 RX ADMIN — PROPOFOL 100 MG: 10 INJECTION, EMULSION INTRAVENOUS at 12:59

## 2022-06-01 RX ADMIN — PROPOFOL 30 MG: 10 INJECTION, EMULSION INTRAVENOUS at 13:05

## 2022-06-01 RX ADMIN — PROPOFOL 30 MG: 10 INJECTION, EMULSION INTRAVENOUS at 13:10

## 2022-06-01 RX ADMIN — LIDOCAINE HYDROCHLORIDE 100 MG: 20 INJECTION, SOLUTION EPIDURAL; INFILTRATION; INTRACAUDAL; PERINEURAL at 12:58

## 2022-06-01 RX ADMIN — SODIUM CHLORIDE: 9 INJECTION, SOLUTION INTRAVENOUS at 12:40
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
## 2022-06-01 NOTE — PERIOP NOTES
Received recovery report from Anesthesia team, see anesthesia note. ABD remains soft and non-tender post procedure. Pt has no complaints at this time and tolerated the procedure well. Endoscope was pre-cleaned at bedside immediately following procedure by Kassandra Alcantara. Post recovery report given to [facility].
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
## 2022-06-01 NOTE — DISCHARGE INSTRUCTIONS
Felicitas Vang  [id number]  1965    DISCHARGE INSTRUCTIONS    Impression:  Incidental findings of colonic diverticulosis, hiatal hernia    Recommendations:     - For colon cancer screening in this average-risk patient, colonoscopy may be repeated in 10 years. If she has recurrent complaints of indigestion, heartburn can either use Prilosec OTC or Nexium 24 daily                Discomfort:  Redness at IV site- apply warm compress to area; if redness or soreness persist- contact your physician. There may be a slight amount of blood passed from the rectum. Gaseous discomfort - walking, belching will help relieve any discomfort. You may not operate a vehicle for 12 hours. You may not engage in an occupation involving machinery or appliances for rest of today. You may not drink alcoholic beverages for at least 12 hours. Avoid making any critical decisions for at least 24 hours. DIET:   High fiber diet. Medications:                Resume usual medications today   ACTIVITY:  You may resume your normal daily activities it is recommended that you spend the remainder of the day resting -  avoid any strenuous activity. CALL M.D. ANY SIGN OF:   Increasing pain, nausea, vomiting  Abdominal distension (swelling)  New increased bleeding (oral or rectal)  Fever (chills)  Pain in chest area  Bloody discharge from nose or mouth  Shortness of breath     Follow-up Instructions:  Call Dr. Denise Bejarano if you have any questions or problems.           DISCHARGE SUMMARY from Nurse    The following personal items collected during your admission are returned to you:   Dental Appliance: Dental Appliances: Partials,Uppers  Vision: Visual Aid: Glasses  Hearing Aid:    Jewelry:    Clothing:    Other Valuables:    Valuables sent to safe:

## 2022-06-01 NOTE — PROGRESS NOTES
Endoscopy discharge instructions have been reviewed and given to patient. The patient verbalized understanding and acceptance of instructions. Dr. Kori Martinez discussed with patient procedure findings and next steps.

## 2022-06-01 NOTE — ANESTHESIA POSTPROCEDURE EVALUATION
Procedure(s):  COLONOSCOPY  ESOPHAGOGASTRODUODENOSCOPY (EGD). MAC    Anesthesia Post Evaluation      Multimodal analgesia: multimodal analgesia not used between 6 hours prior to anesthesia start to PACU discharge  Patient location during evaluation: PACU  Patient participation: complete - patient participated  Level of consciousness: awake  Pain management: adequate  Airway patency: patent  Anesthetic complications: no  Cardiovascular status: acceptable, blood pressure returned to baseline and hemodynamically stable  Respiratory status: acceptable  Hydration status: acceptable  Post anesthesia nausea and vomiting:  controlled      INITIAL Post-op Vital signs:   Vitals Value Taken Time   /65 06/01/22 1340   Temp 36.4 °C (97.6 °F) 06/01/22 1325   Pulse 59 06/01/22 1341   Resp 16 06/01/22 1341   SpO2 99 % 06/01/22 1341   Vitals shown include unvalidated device data.

## 2022-06-01 NOTE — PROGRESS NOTES
Radha Blas  1965  [id number]    Situation:  Verbal report received from: Niurka Parikh RN   Procedure: Procedure(s):  COLONOSCOPY  ESOPHAGOGASTRODUODENOSCOPY (EGD)    Background:    Preoperative diagnosis: NONSPECIFIC ABNORMAL FINDING IN STOOL CONTENTS  Postoperative diagnosis: Hiatal hernia  Diverticulosis    :  Dr. Mark Britton  Assistant(s): Endoscopy Technician-1: Maisha Obando  Endoscopy RN-1: Rosalva Sharp RN    Specimens: * No specimens in log *  H. Pylori  no    Assessment:    Anesthesia gave intra-procedure sedation and medications, see anesthesia flow sheet no    Intravenous fluids: NS@ KVO     Vital signs stable     Abdominal assessment: round and soft     Recommendation:  Discharge patient per MD order.   Return to floor  Family or Friend   Permission to share finding with family or friend yes

## 2022-06-01 NOTE — PROCEDURES
Wojciech Gabriel M.D. 2022    Esophagogastroduodenoscopy (EGD) Colonoscopy Procedure Note  Radha Blas  : 1965  [facility] Medical Record Number: [id number]      Indications:    Occult blood in stool with possible UGI origin  Referring Physician:  Giorgi Dorman MD  Anesthesia/Sedation: see nursing notes  Endoscopist:  Dr. Martir Jin  Assistants: None  Permit:  The indications, risks, benefits and alternatives were reviewed with the patient or their decision maker who was provided an opportunity to ask questions and all questions were answered. The specific risks of esophagogastroduodenoscopy with conscious sedation were reviewed, including but not limited to anesthetic complication, bleeding, adverse drug reaction, missed lesion, infection, IV site reactions, and intestinal perforation which would lead to the need for surgical repair. Alternatives to EGD including radiographic imaging, observation without testing, or laboratory testing were reviewed as well as the limitations of those alternatives discussed. After considering the options and having all their questions answered, the patient or their decision maker provided both verbal and written consent to proceed. Procedure in Detail:  After obtaining informed consent, positioning of the patient in the left lateral decubitus position, and conduction of a pre-procedure pause or \"time out\" the endoscope was introduced into the mouth and advanced to the duodenum. A careful inspection was made, and findings or interventions are described below.     Findings:   Esophagus:normal  Stomach: hiatal hernia and no mucosal lesion appreciated  Duodenum/jejunum: normal    Complications/estimated blood loss: none    Therapies:  none    Specimens: none    Implants: None           Endoscopist:  Dr. Martir Jin  Assistants: None  Complications:  None  Estimate Blood Loss:  None    Colonoscopy is to follow    Permit:  The indications, risks, benefits and alternatives were reviewed with the patient or their decision maker who was provided an opportunity to ask questions and all questions were answered. The specific risks of colonoscopy with conscious sedation were reviewed, including but not limited to anesthetic complication, bleeding, adverse drug reaction, missed lesion, infection, IV site reactions, and intestinal perforation which would lead to the need for surgical repair. Alternatives to colonoscopy including radiographic imaging, observation without testing, or laboratory testing were reviewed including the limitations of those alternatives. After considering the options and having all their questions answered, the patient or their decision maker provided both verbal and written consent to proceed. Procedure in Detail:  After obtaining informed consent, positioning of the patient in the left lateral decubitus position, and conduction of a pre-procedure pause or \"time out\" the endoscope was introduced into the anus and advanced to thirty cm of terminal ileum. The quality of the colonic preparation was good. A careful inspection was made as the colonoscope was withdrawn, findings and interventions are described below. Appendiceal orifice photographed    Findings:   no mucosal lesion appreciated      - Diverticulosis    Specimens:    none    Implants: None    Complications:   None; patient tolerated the procedure well. Estimated blood loss: none    Impression:  Incidental findings of colonic diverticulosis, hiatal hernia    Recommendations:     - For colon cancer screening in this average-risk patient, colonoscopy may be repeated in 10 years. Thank you for entrusting me with this patient's care. Please do not hesitate to contact me with any questions or if I can be of assistance with any of your other patients' GI needs.     Signed By: Lux Pierson MD                        June 1, 2022

## 2022-06-27 DIAGNOSIS — F32.0 CURRENT MILD EPISODE OF MAJOR DEPRESSIVE DISORDER, UNSPECIFIED WHETHER RECURRENT (HCC): ICD-10-CM

## 2022-06-27 RX ORDER — METHYLPHENIDATE HYDROCHLORIDE 5 MG/1
5 TABLET ORAL EVERY MORNING
Qty: 90 TABLET | Refills: 0 | OUTPATIENT
Start: 2022-06-27

## 2022-06-30 NOTE — TELEPHONE ENCOUNTER
Patient has been worked into tomorrows schedule for 115pm with Mychal to discuss refill of stimulant  Rowan Stewart LPN  6/86/2534  9:41 PM

## 2022-06-30 NOTE — TELEPHONE ENCOUNTER
----- Message from Silvia Ba sent at 6/29/2022  4:01 PM EDT -----  Subject: Message to Provider    QUESTIONS  Information for Provider? patient's  is calling to request refill   on aderall   ---------------------------------------------------------------------------  --------------  CALL BACK INFO  What is the best way for the office to contact you? OK to leave message on   voicemail  Preferred Call Back Phone Number? [phone]  ---------------------------------------------------------------------------  --------------  SCRIPT ANSWERS  Relationship to Patient? Other  Representative Name? spouse Jose Francisco Graf  Is the Representative on the appropriate HIPAA document in Epic?  Yes

## 2022-07-01 ENCOUNTER — VIRTUAL VISIT (OUTPATIENT)
Dept: INTERNAL MEDICINE CLINIC | Age: 57
End: 2022-07-01
Payer: MEDICARE

## 2022-07-01 DIAGNOSIS — F41.9 ANXIETY: ICD-10-CM

## 2022-07-01 DIAGNOSIS — E03.9 ACQUIRED HYPOTHYROIDISM: ICD-10-CM

## 2022-07-01 DIAGNOSIS — F32.0 CURRENT MILD EPISODE OF MAJOR DEPRESSIVE DISORDER WITHOUT PRIOR EPISODE (HCC): ICD-10-CM

## 2022-07-01 DIAGNOSIS — R56.9 SEIZURE (HCC): ICD-10-CM

## 2022-07-01 DIAGNOSIS — F32.0 CURRENT MILD EPISODE OF MAJOR DEPRESSIVE DISORDER, UNSPECIFIED WHETHER RECURRENT (HCC): Primary | ICD-10-CM

## 2022-07-01 PROCEDURE — 99214 OFFICE O/P EST MOD 30 MIN: CPT | Performed by: FAMILY MEDICINE

## 2022-07-01 PROCEDURE — G8756 NO BP MEASURE DOC: HCPCS | Performed by: FAMILY MEDICINE

## 2022-07-01 PROCEDURE — G8419 CALC BMI OUT NRM PARAM NOF/U: HCPCS | Performed by: FAMILY MEDICINE

## 2022-07-01 PROCEDURE — G8427 DOCREV CUR MEDS BY ELIG CLIN: HCPCS | Performed by: FAMILY MEDICINE

## 2022-07-01 PROCEDURE — G9899 SCRN MAM PERF RSLTS DOC: HCPCS | Performed by: FAMILY MEDICINE

## 2022-07-01 PROCEDURE — 3017F COLORECTAL CA SCREEN DOC REV: CPT | Performed by: FAMILY MEDICINE

## 2022-07-01 PROCEDURE — G9717 DOC PT DX DEP/BP F/U NT REQ: HCPCS | Performed by: FAMILY MEDICINE

## 2022-07-01 RX ORDER — BUSPIRONE HYDROCHLORIDE 30 MG/1
30 TABLET ORAL 2 TIMES DAILY
Qty: 180 TABLET | Refills: 1 | Status: SHIPPED | OUTPATIENT
Start: 2022-07-01 | End: 2022-09-21

## 2022-07-01 RX ORDER — LEVOTHYROXINE SODIUM 75 UG/1
TABLET ORAL
Qty: 30 TABLET | Refills: 2 | Status: SHIPPED | OUTPATIENT
Start: 2022-07-01

## 2022-07-01 RX ORDER — LEVOTHYROXINE SODIUM 88 UG/1
88 TABLET ORAL EVERY OTHER DAY
Qty: 30 TABLET | Refills: 2 | Status: SHIPPED | OUTPATIENT
Start: 2022-07-01

## 2022-07-01 RX ORDER — METHYLPHENIDATE HYDROCHLORIDE 5 MG/1
5 TABLET ORAL EVERY MORNING
Qty: 90 TABLET | Refills: 0 | Status: SHIPPED | OUTPATIENT
Start: 2022-07-01 | End: 2022-07-26 | Stop reason: SDUPTHER

## 2022-07-01 RX ORDER — DIVALPROEX SODIUM 125 MG/1
125 TABLET, DELAYED RELEASE ORAL 2 TIMES DAILY
Qty: 180 TABLET | Refills: 1 | Status: SHIPPED | OUTPATIENT
Start: 2022-07-01 | End: 2022-09-27 | Stop reason: SDUPTHER

## 2022-07-01 RX ORDER — TOPIRAMATE 50 MG/1
50 TABLET, FILM COATED ORAL 2 TIMES DAILY
Qty: 180 TABLET | Refills: 1 | Status: SHIPPED | OUTPATIENT
Start: 2022-07-01 | End: 2022-09-27 | Stop reason: SDUPTHER

## 2022-07-01 RX ORDER — LEVOTHYROXINE SODIUM 88 UG/1
88 TABLET ORAL EVERY OTHER DAY
Qty: 30 TABLET | Refills: 2 | Status: SHIPPED | OUTPATIENT
Start: 2022-07-01 | End: 2022-07-01 | Stop reason: SDUPTHER

## 2022-07-01 RX ORDER — MIRTAZAPINE 15 MG/1
15 TABLET, FILM COATED ORAL
Qty: 90 TABLET | Refills: 1 | Status: SHIPPED | OUTPATIENT
Start: 2022-07-01 | End: 2022-10-27 | Stop reason: SDUPTHER

## 2022-07-01 RX ORDER — DIVALPROEX SODIUM 250 MG/1
250 TABLET, DELAYED RELEASE ORAL 2 TIMES DAILY
Qty: 180 TABLET | Refills: 1 | Status: SHIPPED | OUTPATIENT
Start: 2022-07-01 | End: 2022-09-27 | Stop reason: SDUPTHER
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
## 2022-07-01 NOTE — PROGRESS NOTES
Akosua Stout is a 61 y.o. female  Chief Complaint   Patient presents with    Follow-up     1. Have you been to the ER, urgent care clinic since your last visit?no  Hospitalized since your last visit?no    2. Have you seen or consulted any other health care providers outside of the [address] [place] [address] since your last visit? recent colonoscopy. Include any pap smears or colon screening. No  Health Maintenance   Topic Date Due    COVID-19 Vaccine (1) Never done    Medicare Yearly Exam  05/13/2022    Flu Vaccine (1) 09/01/2022    Breast Cancer Screen Mammogram  02/26/2023    Depression Monitoring  03/17/2023    Lipid Screen  01/20/2026    Cervical cancer screen  02/05/2026    DTaP/Tdap/Td series (2 - Td or Tdap) 12/22/2027    Colorectal Cancer Screening Combo  06/01/2032    Hepatitis C Screening  Completed    Shingrix Vaccine Age 50>  Completed    Pneumococcal 0-64 years  Aged Out     There were no vitals taken for this visit.

## 2022-07-26 DIAGNOSIS — F32.0 CURRENT MILD EPISODE OF MAJOR DEPRESSIVE DISORDER, UNSPECIFIED WHETHER RECURRENT (HCC): ICD-10-CM

## 2022-07-26 RX ORDER — METHYLPHENIDATE HYDROCHLORIDE 5 MG/1
5 TABLET ORAL EVERY MORNING
Qty: 90 TABLET | Refills: 0 | Status: SHIPPED | OUTPATIENT
Start: 2022-07-26 | End: 2022-10-27 | Stop reason: SDUPTHER

## 2022-07-26 NOTE — TELEPHONE ENCOUNTER
Patient refill request      Requested Prescriptions     Pending Prescriptions Disp Refills    methylphenidate HCl (RITALIN) 5 mg tablet 90 Tablet 0     Sig: Take 1 Tablet by mouth Every morning. Max Daily Amount: 5 mg.

## 2022-09-21 DIAGNOSIS — F41.9 ANXIETY: ICD-10-CM

## 2022-09-21 RX ORDER — BUSPIRONE HYDROCHLORIDE 30 MG/1
TABLET ORAL
Qty: 180 TABLET | Refills: 1 | Status: SHIPPED | OUTPATIENT
Start: 2022-09-21

## 2022-09-27 ENCOUNTER — VIRTUAL VISIT (OUTPATIENT)
Dept: NEUROLOGY | Age: 57
End: 2022-09-27
Payer: MEDICARE

## 2022-09-27 DIAGNOSIS — G25.0 ESSENTIAL TREMOR: ICD-10-CM

## 2022-09-27 DIAGNOSIS — I10 ESSENTIAL HYPERTENSION: ICD-10-CM

## 2022-09-27 DIAGNOSIS — R56.9 SEIZURE (HCC): Primary | ICD-10-CM

## 2022-09-27 DIAGNOSIS — E03.9 ACQUIRED HYPOTHYROIDISM: ICD-10-CM

## 2022-09-27 DIAGNOSIS — F32.0 CURRENT MILD EPISODE OF MAJOR DEPRESSIVE DISORDER, UNSPECIFIED WHETHER RECURRENT (HCC): Primary | ICD-10-CM

## 2022-09-27 RX ORDER — DIVALPROEX SODIUM 250 MG/1
250 TABLET, DELAYED RELEASE ORAL 2 TIMES DAILY
Qty: 180 TABLET | Refills: 1 | Status: SHIPPED | OUTPATIENT
Start: 2022-09-27

## 2022-09-27 RX ORDER — DIVALPROEX SODIUM 125 MG/1
125 TABLET, DELAYED RELEASE ORAL 2 TIMES DAILY
Qty: 180 TABLET | Refills: 1 | Status: SHIPPED | OUTPATIENT
Start: 2022-09-27

## 2022-09-27 RX ORDER — TOPIRAMATE 50 MG/1
50 TABLET, FILM COATED ORAL 2 TIMES DAILY
Qty: 180 TABLET | Refills: 1 | Status: SHIPPED | OUTPATIENT
Start: 2022-09-27
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
## 2022-10-25 DIAGNOSIS — F32.0 CURRENT MILD EPISODE OF MAJOR DEPRESSIVE DISORDER, UNSPECIFIED WHETHER RECURRENT (HCC): ICD-10-CM

## 2022-10-25 RX ORDER — METHYLPHENIDATE HYDROCHLORIDE 5 MG/1
5 TABLET ORAL EVERY MORNING
Qty: 90 TABLET | Refills: 0 | Status: CANCELLED | OUTPATIENT
Start: 2022-10-25

## 2022-10-27 ENCOUNTER — VIRTUAL VISIT (OUTPATIENT)
Dept: INTERNAL MEDICINE CLINIC | Age: 57
End: 2022-10-27
Payer: MEDICARE

## 2022-10-27 DIAGNOSIS — R56.9 SEIZURE (HCC): ICD-10-CM

## 2022-10-27 DIAGNOSIS — F32.0 CURRENT MILD EPISODE OF MAJOR DEPRESSIVE DISORDER, UNSPECIFIED WHETHER RECURRENT (HCC): Primary | ICD-10-CM

## 2022-10-27 DIAGNOSIS — I10 ESSENTIAL HYPERTENSION: ICD-10-CM

## 2022-10-27 DIAGNOSIS — E03.9 ACQUIRED HYPOTHYROIDISM: ICD-10-CM

## 2022-10-27 PROCEDURE — G9717 DOC PT DX DEP/BP F/U NT REQ: HCPCS | Performed by: FAMILY MEDICINE

## 2022-10-27 PROCEDURE — G8427 DOCREV CUR MEDS BY ELIG CLIN: HCPCS | Performed by: FAMILY MEDICINE

## 2022-10-27 PROCEDURE — G8756 NO BP MEASURE DOC: HCPCS | Performed by: FAMILY MEDICINE

## 2022-10-27 PROCEDURE — 3017F COLORECTAL CA SCREEN DOC REV: CPT | Performed by: FAMILY MEDICINE

## 2022-10-27 PROCEDURE — G9899 SCRN MAM PERF RSLTS DOC: HCPCS | Performed by: FAMILY MEDICINE

## 2022-10-27 PROCEDURE — G8419 CALC BMI OUT NRM PARAM NOF/U: HCPCS | Performed by: FAMILY MEDICINE

## 2022-10-27 PROCEDURE — 99214 OFFICE O/P EST MOD 30 MIN: CPT | Performed by: FAMILY MEDICINE

## 2022-10-27 RX ORDER — METHYLPHENIDATE HYDROCHLORIDE 5 MG/1
5 TABLET ORAL EVERY MORNING
Qty: 40 TABLET | Refills: 0 | Status: SHIPPED | OUTPATIENT
Start: 2022-10-27

## 2022-10-27 RX ORDER — MIRTAZAPINE 15 MG/1
15 TABLET, FILM COATED ORAL
Qty: 90 TABLET | Refills: 1 | Status: SHIPPED | OUTPATIENT
Start: 2022-10-27
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
## 2022-10-27 NOTE — PROGRESS NOTES
Chief Complaint   Patient presents with    Medication Refill     Patient is aware that this is a Virtual Visit or Phone Call Only doctor's visit. Patient has not been out of the country in (14 months), NO diarrhea, NO cough, NO chest conjestion, NO temp. Pt has not been around anyone with these symptoms. Health Maintenance reviewed. I have reviewed the patient's medical history in detail and updated the computerized patient record. Have you been to the ER, urgent care clinic since your last visit? No Hospitalized since your last visit? No     2. Have you seen or consulted any other health care providers outside of the [address] [place] [address] since your last visit? No  Include any pap smears or colon screening. Encouraged pt to discuss pt's wishes with spouse/partner/family and bring them in the next appt to follow thru with the Advanced Directive      Fall Risk Assessment, last 12 mths 5/7/2021   Able to walk? Yes   Fall in past 12 months? 1   Do you feel unsteady? -   Are you worried about falling 1   Is TUG test greater than 12 seconds? -   Is the gait abnormal? 1   Number of falls in past 12 months 2   Fall with injury? 1       3 most recent PHQ Screens 7/1/2022   PHQ Not Done -   Little interest or pleasure in doing things Not at all   Feeling down, depressed, irritable, or hopeless Not at all   Total Score PHQ 2 0       Abuse Screening Questionnaire 7/1/2022   Do you ever feel afraid of your partner? N   Are you in a relationship with someone who physically or mentally threatens you? N   Is it safe for you to go home?  Y       ADL Assessment 5/7/2021   Feeding yourself No Help Needed   Getting from bed to chair No Help Needed   Getting dressed Help Needed   Bathing or showering Help Needed   Walk across the room (includes cane/walker) Help Needed   Using the telphone Help Needed   Taking your medications Help Needed   Preparing meals Help Needed   Managing money (expenses/bills) Help Needed   Moderately strenuous housework (laundry) Help Needed   Shopping for personal items (toiletries/medicines) Help Needed   Shopping for groceries Help Needed   Driving Help Needed   Climbing a flight of stairs Help Needed   Getting to places beyond walking distances Help Needed

## 2022-11-16 ENCOUNTER — TELEPHONE (OUTPATIENT)
Dept: INTERNAL MEDICINE CLINIC | Age: 57
End: 2022-11-16

## 2022-11-16 NOTE — PROGRESS NOTES
Reviewed result with patient. Told her to take daily 75 mcg Monday Wednesday Friday, 88 mcg the other days. Recheck 6 months, increase water intake.

## 2022-12-06 DIAGNOSIS — F32.0 CURRENT MILD EPISODE OF MAJOR DEPRESSIVE DISORDER, UNSPECIFIED WHETHER RECURRENT (HCC): ICD-10-CM

## 2022-12-06 NOTE — TELEPHONE ENCOUNTER
Patient refill request      Requested Prescriptions     Pending Prescriptions Disp Refills    methylphenidate HCl (RITALIN) 5 mg tablet 40 Tablet 0     Sig: Take 1 Tablet by mouth Every morning. Max Daily Amount: 5 mg.

## 2022-12-08 RX ORDER — METHYLPHENIDATE HYDROCHLORIDE 5 MG/1
5 TABLET ORAL EVERY MORNING
Qty: 40 TABLET | Refills: 0 | Status: SHIPPED | OUTPATIENT
Start: 2022-12-08

## 2023-01-16 DIAGNOSIS — F32.0 CURRENT MILD EPISODE OF MAJOR DEPRESSIVE DISORDER, UNSPECIFIED WHETHER RECURRENT (HCC): ICD-10-CM

## 2023-01-17 RX ORDER — METHYLPHENIDATE HYDROCHLORIDE 5 MG/1
5 TABLET ORAL EVERY MORNING
Qty: 40 TABLET | Refills: 0 | OUTPATIENT
Start: 2023-01-17

## 2023-01-19 ENCOUNTER — VIRTUAL VISIT (OUTPATIENT)
Dept: INTERNAL MEDICINE CLINIC | Age: 58
End: 2023-01-19
Payer: MEDICARE

## 2023-01-19 DIAGNOSIS — G40.409 TONIC SEIZURE DISORDER (HCC): Primary | ICD-10-CM

## 2023-01-19 DIAGNOSIS — F31.5 BIPOLAR AFFECTIVE DISORDER, DEPRESSED, SEVERE, WITH PSYCHOTIC BEHAVIOR (HCC): ICD-10-CM

## 2023-01-19 DIAGNOSIS — R56.9 SEIZURE (HCC): ICD-10-CM

## 2023-01-19 DIAGNOSIS — F31.70 BIPOLAR AFFECTIVE DISORDER IN REMISSION (HCC): ICD-10-CM

## 2023-01-19 DIAGNOSIS — F41.9 ANXIETY: ICD-10-CM

## 2023-01-19 DIAGNOSIS — F32.0 CURRENT MILD EPISODE OF MAJOR DEPRESSIVE DISORDER, UNSPECIFIED WHETHER RECURRENT (HCC): ICD-10-CM

## 2023-01-19 DIAGNOSIS — E03.9 ACQUIRED HYPOTHYROIDISM: ICD-10-CM

## 2023-01-19 DIAGNOSIS — E03.9 HYPOTHYROIDISM, UNSPECIFIED TYPE: ICD-10-CM

## 2023-01-19 PROBLEM — G47.00 INSOMNIA DISORDER WITH NON-SLEEP DISORDER MENTAL COMORBIDITY: Status: RESOLVED | Noted: 2019-03-12 | Resolved: 2023-01-19

## 2023-01-19 RX ORDER — DIVALPROEX SODIUM 125 MG/1
125 TABLET, DELAYED RELEASE ORAL 2 TIMES DAILY
Qty: 180 TABLET | Refills: 1 | Status: SHIPPED | OUTPATIENT
Start: 2023-01-19

## 2023-01-19 RX ORDER — TOPIRAMATE 50 MG/1
50 TABLET, FILM COATED ORAL 2 TIMES DAILY
Qty: 180 TABLET | Refills: 1 | Status: SHIPPED | OUTPATIENT
Start: 2023-01-19

## 2023-01-19 RX ORDER — DIVALPROEX SODIUM 250 MG/1
250 TABLET, DELAYED RELEASE ORAL 2 TIMES DAILY
Qty: 180 TABLET | Refills: 1 | Status: SHIPPED | OUTPATIENT
Start: 2023-01-19

## 2023-01-19 RX ORDER — METHYLPHENIDATE HYDROCHLORIDE 5 MG/1
5 TABLET ORAL EVERY MORNING
Qty: 30 TABLET | Refills: 0 | Status: SHIPPED | OUTPATIENT
Start: 2023-01-19

## 2023-01-19 RX ORDER — BUSPIRONE HYDROCHLORIDE 30 MG/1
30 TABLET ORAL 2 TIMES DAILY
Qty: 180 TABLET | Refills: 1 | Status: SHIPPED | OUTPATIENT
Start: 2023-01-19

## 2023-01-19 RX ORDER — LEVOTHYROXINE SODIUM 75 UG/1
TABLET ORAL
Qty: 30 TABLET | Refills: 2 | Status: SHIPPED | OUTPATIENT
Start: 2023-01-19

## 2023-01-19 RX ORDER — LEVOTHYROXINE SODIUM 88 UG/1
88 TABLET ORAL EVERY OTHER DAY
Qty: 30 TABLET | Refills: 2 | Status: SHIPPED | OUTPATIENT
Start: 2023-01-19
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
## 2023-01-19 NOTE — PROGRESS NOTES
Chief Complaint   Patient presents with    Medication Refill            Patient is aware that this is a Virtual Visit or Phone Call Only doctor's visit. Patient has not been out of the country in (14 months), NO diarrhea, NO cough, NO chest conjestion, NO temp. Pt has not been around anyone with these symptoms. Health Maintenance reviewed. I have reviewed the patient's medical history in detail and updated the computerized patient record. Have you been to the ER, urgent care clinic since your last visit? No Hospitalized since your last visit?  no    2. Have you seen or consulted any other health care providers outside of the [address] [place] [address] since your last visit? Include any pap smears or colon screening. Encouraged pt to discuss pt's wishes with spouse/partner/family and bring them in the next appt to follow thru with the Advanced Directive      Fall Risk Assessment, last 12 mths 5/7/2021   Able to walk? Yes   Fall in past 12 months? 1   Do you feel unsteady? -   Are you worried about falling 1   Is TUG test greater than 12 seconds? -   Is the gait abnormal? 1   Number of falls in past 12 months 2   Fall with injury? 1       3 most recent PHQ Screens 1/19/2023   PHQ Not Done -   Little interest or pleasure in doing things Several days   Feeling down, depressed, irritable, or hopeless Several days   Total Score PHQ 2 2       Abuse Screening Questionnaire 1/19/2023   Do you ever feel afraid of your partner? N   Are you in a relationship with someone who physically or mentally threatens you? N   Is it safe for you to go home?  Y       ADL Assessment 1/19/2023   Feeding yourself No Help Needed   Getting from bed to chair No Help Needed   Getting dressed No Help Needed   Bathing or showering No Help Needed   Walk across the room (includes cane/walker) No Help Needed   Using the telphone No Help Needed   Taking your medications No Help Needed   Preparing meals No Help Needed   Managing money (expenses/bills) No Help Needed   Moderately strenuous housework (laundry) No Help Needed   Shopping for personal items (toiletries/medicines) No Help Needed   Shopping for groceries No Help Needed   Driving No Help Needed   Climbing a flight of stairs No Help Needed   Getting to places beyond walking distances No Help Needed

## 2023-02-16 DIAGNOSIS — F32.0 CURRENT MILD EPISODE OF MAJOR DEPRESSIVE DISORDER, UNSPECIFIED WHETHER RECURRENT (HCC): ICD-10-CM

## 2023-02-16 RX ORDER — METHYLPHENIDATE HYDROCHLORIDE 5 MG/1
5 TABLET ORAL EVERY MORNING
Qty: 30 TABLET | Refills: 0 | Status: SHIPPED | OUTPATIENT
Start: 2023-02-16

## 2023-03-20 DIAGNOSIS — F32.0 CURRENT MILD EPISODE OF MAJOR DEPRESSIVE DISORDER, UNSPECIFIED WHETHER RECURRENT (HCC): ICD-10-CM

## 2023-03-21 RX ORDER — METHYLPHENIDATE HYDROCHLORIDE 5 MG/1
5 TABLET ORAL EVERY MORNING
Qty: 30 TABLET | Refills: 0 | Status: SHIPPED | OUTPATIENT
Start: 2023-03-21

## 2023-04-18 DIAGNOSIS — F32.0 CURRENT MILD EPISODE OF MAJOR DEPRESSIVE DISORDER, UNSPECIFIED WHETHER RECURRENT (HCC): ICD-10-CM

## 2023-04-19 RX ORDER — METHYLPHENIDATE HYDROCHLORIDE 5 MG/1
5 TABLET ORAL EVERY MORNING
Qty: 30 TABLET | Refills: 0 | Status: SHIPPED | OUTPATIENT
Start: 2023-04-19

## 2023-05-02 DIAGNOSIS — F32.0 CURRENT MILD EPISODE OF MAJOR DEPRESSIVE DISORDER, UNSPECIFIED WHETHER RECURRENT (HCC): ICD-10-CM

## 2023-05-02 RX ORDER — MIRTAZAPINE 15 MG/1
TABLET, FILM COATED ORAL
Qty: 90 TABLET | Refills: 1 | Status: SHIPPED | OUTPATIENT
Start: 2023-05-02

## 2023-05-23 ENCOUNTER — TELEMEDICINE (OUTPATIENT)
Facility: CLINIC | Age: 58
End: 2023-05-23
Payer: MEDICARE

## 2023-05-23 DIAGNOSIS — F41.1 GAD (GENERALIZED ANXIETY DISORDER): ICD-10-CM

## 2023-05-23 DIAGNOSIS — E03.9 ACQUIRED HYPOTHYROIDISM: Primary | ICD-10-CM

## 2023-05-23 DIAGNOSIS — G40.409 TONIC SEIZURE DISORDER (HCC): ICD-10-CM

## 2023-05-23 DIAGNOSIS — F31.76 BIPOLAR DISORDER, IN FULL REMISSION, MOST RECENT EPISODE DEPRESSED (HCC): ICD-10-CM

## 2023-05-23 DIAGNOSIS — I10 ESSENTIAL HYPERTENSION: ICD-10-CM

## 2023-05-23 PROCEDURE — 99214 OFFICE O/P EST MOD 30 MIN: CPT | Performed by: FAMILY MEDICINE

## 2023-05-23 RX ORDER — TOPIRAMATE 50 MG/1
50 TABLET, FILM COATED ORAL 2 TIMES DAILY
Qty: 180 TABLET | Refills: 1 | Status: SHIPPED | OUTPATIENT
Start: 2023-05-23

## 2023-05-23 RX ORDER — MIRTAZAPINE 15 MG/1
15 TABLET, FILM COATED ORAL NIGHTLY
Qty: 90 TABLET | Refills: 1 | Status: SHIPPED | OUTPATIENT
Start: 2023-05-23

## 2023-05-23 RX ORDER — DIVALPROEX SODIUM 125 MG/1
125 TABLET, DELAYED RELEASE ORAL 2 TIMES DAILY
Qty: 180 TABLET | Refills: 1 | Status: SHIPPED | OUTPATIENT
Start: 2023-05-23

## 2023-05-23 RX ORDER — LEVOTHYROXINE SODIUM 0.07 MG/1
TABLET ORAL
Qty: 45 TABLET | Refills: 1 | Status: SHIPPED | OUTPATIENT
Start: 2023-05-23

## 2023-05-23 RX ORDER — METHYLPHENIDATE HYDROCHLORIDE 5 MG/1
5 TABLET ORAL EVERY MORNING
Qty: 30 TABLET | Refills: 0 | Status: SHIPPED | OUTPATIENT
Start: 2023-05-23 | End: 2023-06-22

## 2023-05-23 RX ORDER — DIVALPROEX SODIUM 250 MG/1
250 TABLET, DELAYED RELEASE ORAL 2 TIMES DAILY
Qty: 180 TABLET | Refills: 1 | Status: SHIPPED | OUTPATIENT
Start: 2023-05-23

## 2023-05-23 RX ORDER — BUSPIRONE HYDROCHLORIDE 30 MG/1
30 TABLET ORAL 2 TIMES DAILY
Qty: 180 TABLET | Refills: 1 | Status: SHIPPED | OUTPATIENT
Start: 2023-05-23

## 2023-05-23 RX ORDER — LEVOTHYROXINE SODIUM 88 UG/1
TABLET ORAL
Qty: 50 TABLET | Refills: 1 | Status: SHIPPED | OUTPATIENT
Start: 2023-05-23

## 2023-05-23 ASSESSMENT — PATIENT HEALTH QUESTIONNAIRE - PHQ9
10. IF YOU CHECKED OFF ANY PROBLEMS, HOW DIFFICULT HAVE THESE PROBLEMS MADE IT FOR YOU TO DO YOUR WORK, TAKE CARE OF THINGS AT HOME, OR GET ALONG WITH OTHER PEOPLE: 3
2. FEELING DOWN, DEPRESSED OR HOPELESS: 2
4. FEELING TIRED OR HAVING LITTLE ENERGY: 3
SUM OF ALL RESPONSES TO PHQ QUESTIONS 1-9: 22
5. POOR APPETITE OR OVEREATING: 3
7. TROUBLE CONCENTRATING ON THINGS, SUCH AS READING THE NEWSPAPER OR WATCHING TELEVISION: 3
1. LITTLE INTEREST OR PLEASURE IN DOING THINGS: 2
4. FEELING TIRED OR HAVING LITTLE ENERGY: 3
3. TROUBLE FALLING OR STAYING ASLEEP: 3
9. THOUGHTS THAT YOU WOULD BE BETTER OFF DEAD, OR OF HURTING YOURSELF: 3
SUM OF ALL RESPONSES TO PHQ QUESTIONS 1-9: 21
SUM OF ALL RESPONSES TO PHQ QUESTIONS 1-9: 22
5. POOR APPETITE OR OVEREATING: 3
9. THOUGHTS THAT YOU WOULD BE BETTER OFF DEAD, OR OF HURTING YOURSELF: 0
10. IF YOU CHECKED OFF ANY PROBLEMS, HOW DIFFICULT HAVE THESE PROBLEMS MADE IT FOR YOU TO DO YOUR WORK, TAKE CARE OF THINGS AT HOME, OR GET ALONG WITH OTHER PEOPLE: 3
SUM OF ALL RESPONSES TO PHQ QUESTIONS 1-9: 21
SUM OF ALL RESPONSES TO PHQ9 QUESTIONS 1 & 2: 4
SUM OF ALL RESPONSES TO PHQ QUESTIONS 1-9: 22
7. TROUBLE CONCENTRATING ON THINGS, SUCH AS READING THE NEWSPAPER OR WATCHING TELEVISION: 3
6. FEELING BAD ABOUT YOURSELF - OR THAT YOU ARE A FAILURE OR HAVE LET YOURSELF OR YOUR FAMILY DOWN: 3
3. TROUBLE FALLING OR STAYING ASLEEP: 3
SUM OF ALL RESPONSES TO PHQ QUESTIONS 1-9: 22
SUM OF ALL RESPONSES TO PHQ QUESTIONS 1-9: 18
6. FEELING BAD ABOUT YOURSELF - OR THAT YOU ARE A FAILURE OR HAVE LET YOURSELF OR YOUR FAMILY DOWN: 3
8. MOVING OR SPEAKING SO SLOWLY THAT OTHER PEOPLE COULD HAVE NOTICED. OR THE OPPOSITE, BEING SO FIGETY OR RESTLESS THAT YOU HAVE BEEN MOVING AROUND A LOT MORE THAN USUAL: 3
SUM OF ALL RESPONSES TO PHQ QUESTIONS 1-9: 21
8. MOVING OR SPEAKING SO SLOWLY THAT OTHER PEOPLE COULD HAVE NOTICED. OR THE OPPOSITE, BEING SO FIGETY OR RESTLESS THAT YOU HAVE BEEN MOVING AROUND A LOT MORE THAN USUAL: 3

## 2023-05-23 ASSESSMENT — COLUMBIA-SUICIDE SEVERITY RATING SCALE - C-SSRS
6. HAVE YOU EVER DONE ANYTHING, STARTED TO DO ANYTHING, OR PREPARED TO DO ANYTHING TO END YOUR LIFE?: NO
1. WITHIN THE PAST MONTH, HAVE YOU WISHED YOU WERE DEAD OR WISHED YOU COULD GO TO SLEEP AND NOT WAKE UP?: NO
2. HAVE YOU ACTUALLY HAD ANY THOUGHTS OF KILLING YOURSELF?: NO
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
## 2023-05-23 NOTE — PROGRESS NOTES
Chief Complaint   Patient presents with    Depression     Med refill       Patient is aware that this is a Virtual Visit or Phone Call Only doctor's visit. Patient has not been out of the country in (14 months), NO diarrhea, NO cough, NO chest conjestion, NO temp. Pt has not been around anyone with these symptoms. Health Maintenance reviewed. I have reviewed the patient's medical history in detail and updated the computerized patient record. Have you been to the ER, urgent care clinic since your last visit? No Hospitalized since your last visit? No     2. Have you seen or consulted any other health care providers outside of the [address] [place] [address] since your last visit? No Include any pap smears or colon screening.      Encouraged pt to discuss pt's wishes with spouse/partner/family and bring them in the next appt to follow thru with the Advanced Directive

## 2023-06-21 DIAGNOSIS — F31.76 BIPOLAR DISORDER, IN FULL REMISSION, MOST RECENT EPISODE DEPRESSED (HCC): ICD-10-CM

## 2023-06-21 RX ORDER — METHYLPHENIDATE HYDROCHLORIDE 5 MG/1
5 TABLET ORAL EVERY MORNING
Qty: 30 TABLET | Refills: 0 | Status: SHIPPED | OUTPATIENT
Start: 2023-06-21 | End: 2023-07-21

## 2023-06-21 NOTE — TELEPHONE ENCOUNTER
Patient refill request; last dosage is tomorrow 6/22/23 - this helps with her seizures!  Please send to [facility]      methylphenidate (RITALIN) 5 MG tablet

## 2023-07-21 DIAGNOSIS — F31.76 BIPOLAR DISORDER, IN FULL REMISSION, MOST RECENT EPISODE DEPRESSED (HCC): ICD-10-CM

## 2023-07-21 NOTE — TELEPHONE ENCOUNTER
Pt  calling in for refill to go to [facility]     Methylphenidate 5mg tab 30 day supply    Patient has enough for weekend, will need this by Monday.  I reminded  to call at least 3-5 days in advance for the future

## 2023-07-24 NOTE — TELEPHONE ENCOUNTER
Fill request sent to Dr Cornelius Mcknight - pending his approval  Rafael Guzman LPN 0/93/1851 5:54 PM

## 2023-07-25 NOTE — TELEPHONE ENCOUNTER
Pt  calling to get status on refill that was called in on 7/21. Pt has been out of med since the weekend.

## 2023-07-26 RX ORDER — METHYLPHENIDATE HYDROCHLORIDE 5 MG/1
5 TABLET ORAL EVERY MORNING
Qty: 30 TABLET | Refills: 0 | Status: SHIPPED | OUTPATIENT
Start: 2023-07-26 | End: 2023-08-23 | Stop reason: SDUPTHER

## 2023-08-22 DIAGNOSIS — F31.76 BIPOLAR DISORDER, IN FULL REMISSION, MOST RECENT EPISODE DEPRESSED (HCC): ICD-10-CM

## 2023-08-23 ENCOUNTER — TELEMEDICINE (OUTPATIENT)
Facility: CLINIC | Age: 58
End: 2023-08-23
Payer: MEDICARE

## 2023-08-23 DIAGNOSIS — F31.76 BIPOLAR DISORDER, IN FULL REMISSION, MOST RECENT EPISODE DEPRESSED (HCC): Primary | ICD-10-CM

## 2023-08-23 DIAGNOSIS — F43.10 PTSD (POST-TRAUMATIC STRESS DISORDER): ICD-10-CM

## 2023-08-23 DIAGNOSIS — E03.9 ACQUIRED HYPOTHYROIDISM: ICD-10-CM

## 2023-08-23 DIAGNOSIS — I10 ESSENTIAL HYPERTENSION: ICD-10-CM

## 2023-08-23 DIAGNOSIS — G40.409 TONIC SEIZURE DISORDER (HCC): ICD-10-CM

## 2023-08-23 PROCEDURE — 99213 OFFICE O/P EST LOW 20 MIN: CPT | Performed by: FAMILY MEDICINE

## 2023-08-23 RX ORDER — METHYLPHENIDATE HYDROCHLORIDE 5 MG/1
5 TABLET ORAL EVERY MORNING
Qty: 30 TABLET | Refills: 0 | OUTPATIENT
Start: 2023-08-23 | End: 2023-09-22

## 2023-08-23 RX ORDER — METHYLPHENIDATE HYDROCHLORIDE 5 MG/1
5 TABLET ORAL EVERY MORNING
Qty: 30 TABLET | Refills: 0 | Status: SHIPPED | OUTPATIENT
Start: 2023-08-23 | End: 2023-09-22

## 2023-08-23 ASSESSMENT — PATIENT HEALTH QUESTIONNAIRE - PHQ9
SUM OF ALL RESPONSES TO PHQ QUESTIONS 1-9: 0
2. FEELING DOWN, DEPRESSED OR HOPELESS: 0
SUM OF ALL RESPONSES TO PHQ QUESTIONS 1-9: 0
1. LITTLE INTEREST OR PLEASURE IN DOING THINGS: 0
SUM OF ALL RESPONSES TO PHQ9 QUESTIONS 1 & 2: 0
SUM OF ALL RESPONSES TO PHQ QUESTIONS 1-9: 0
SUM OF ALL RESPONSES TO PHQ QUESTIONS 1-9: 0

## 2023-08-23 NOTE — PROGRESS NOTES
([address])        3. PTSD (post-traumatic stress disorder)        4. Acquired hypothyroidism  TSH      5. Essential hypertension  Basic Metabolic Panel        Orders Placed This Encounter    Basic Metabolic Panel     Standing Status:   Future     Standing Expiration Date:   8/23/2024    TSH     Standing Status:   Future     Standing Expiration Date:   8/23/2024    methylphenidate (RITALIN) 5 MG tablet     Sig: Take 1 tablet by mouth every morning for 30 days. Max Daily Amount: 5 mg     Dispense:  30 tablet     Refill:  0     .   Return in about 6 weeks (around 10/4/2023) for Medicare wellness visit annual.

## 2023-09-25 ENCOUNTER — TELEPHONE (OUTPATIENT)
Facility: CLINIC | Age: 58
End: 2023-09-25

## 2023-09-26 ENCOUNTER — TELEMEDICINE (OUTPATIENT)
Age: 58
End: 2023-09-26
Payer: MEDICARE

## 2023-09-26 DIAGNOSIS — G40.409 TONIC SEIZURE DISORDER (HCC): ICD-10-CM

## 2023-09-26 DIAGNOSIS — G25.0 ESSENTIAL TREMOR: Primary | ICD-10-CM

## 2023-09-26 DIAGNOSIS — F31.5 BIPOLAR AFFECTIVE DISORDER, DEPRESSED, SEVERE, WITH PSYCHOTIC BEHAVIOR (HCC): Primary | ICD-10-CM

## 2023-09-26 PROCEDURE — 99214 OFFICE O/P EST MOD 30 MIN: CPT | Performed by: NURSE PRACTITIONER

## 2023-09-26 RX ORDER — DIVALPROEX SODIUM 125 MG/1
125 TABLET, DELAYED RELEASE ORAL 2 TIMES DAILY
Qty: 180 TABLET | Refills: 3 | Status: SHIPPED | OUTPATIENT
Start: 2023-09-26

## 2023-09-26 RX ORDER — TOPIRAMATE 50 MG/1
50 TABLET, FILM COATED ORAL 2 TIMES DAILY
Qty: 180 TABLET | Refills: 3 | Status: SHIPPED | OUTPATIENT
Start: 2023-09-26

## 2023-09-26 RX ORDER — DIVALPROEX SODIUM 250 MG/1
250 TABLET, DELAYED RELEASE ORAL 2 TIMES DAILY
Qty: 180 TABLET | Refills: 3 | Status: SHIPPED | OUTPATIENT
Start: 2023-09-26

## 2023-09-26 RX ORDER — METHYLPHENIDATE HYDROCHLORIDE 5 MG/1
5 TABLET ORAL DAILY
Qty: 30 TABLET | Refills: 0 | Status: SHIPPED | OUTPATIENT
Start: 2023-09-26 | End: 2023-10-26

## 2023-09-26 NOTE — PROGRESS NOTES
[address] [facility]  [address] [address] [facility]   [phone] Office   [phone] Fax           Date:  23     Name:  Amparo Rodriguez  :  1965  MRN:  [id number]     PCP:  Rox Epps MD    Amparo Rodriguez is a 60 y.o. female who was seen by synchronous (real-time) audio-video technology on 2023 for Seizures and Tremors    Subjective: Follow up for essential tremor and seizure. The essential tremor is largely in the hands and is not bother some. No seizures since her last visit. She had a fall a few weeks ago in her hallway. States that she had her hands full and tripped. No other falls. No new surgeries. No new medical issues. Current Outpatient Medications   Medication Sig    busPIRone (BUSPAR) 30 MG tablet Take 30 mg by mouth 2 times daily    vitamin D3 (CHOLECALCIFEROL) 125 MCG (5000 UT) TABS tablet Take 1 tablet by mouth daily    divalproex (DEPAKOTE) 125 MG DR tablet Take 1 tablet by mouth 2 times daily    divalproex (DEPAKOTE) 250 MG DR tablet Take 1 tablet by mouth 2 times daily    levothyroxine (SYNTHROID) 75 MCG tablet  Sat    levothyroxine (SYNTHROID) 88 MCG tablet  and Fri and Sun    mirtazapine (REMERON) 15 MG tablet Take 1 tablet by mouth nightly    topiramate (TOPAMAX) 50 MG tablet Take 1 tablet by mouth 2 times daily    KRILL OIL PO Take 1 tablet by mouth daily    Red Yeast Rice Extract 600 MG CAPS Take 600 mg by mouth     No current facility-administered medications for this visit.      Allergies   Allergen Reactions    Olmesartan Itching    Penicillins Rash      Past Medical History:   Diagnosis Date    Allergic rhinitis     Anxiety and depression     no longer an issue due to Traumatic brain injury    Bipolar disorder ([address]) [id number]    no longer an issue due to Traumatic brain injury    Depression     DX 2014-15/ [address] , [facility]

## 2023-10-03 DIAGNOSIS — G40.409 TONIC SEIZURE DISORDER (HCC): ICD-10-CM

## 2023-10-03 RX ORDER — TOPIRAMATE 50 MG/1
50 TABLET, FILM COATED ORAL 2 TIMES DAILY
Qty: 180 TABLET | Refills: 1 | OUTPATIENT
Start: 2023-10-03

## 2023-10-06 RX ORDER — DIVALPROEX SODIUM 250 MG/1
250 TABLET, DELAYED RELEASE ORAL 2 TIMES DAILY
Qty: 180 TABLET | Refills: 3 | Status: SHIPPED | OUTPATIENT
Start: 2023-10-06

## 2023-10-25 DIAGNOSIS — F31.5 BIPOLAR AFFECTIVE DISORDER, DEPRESSED, SEVERE, WITH PSYCHOTIC BEHAVIOR (HCC): ICD-10-CM

## 2023-10-25 RX ORDER — METHYLPHENIDATE HYDROCHLORIDE 5 MG/1
5 TABLET ORAL DAILY
Qty: 30 TABLET | Refills: 0 | Status: SHIPPED | OUTPATIENT
Start: 2023-10-25 | End: 2023-11-24

## 2023-11-27 DIAGNOSIS — E03.9 ACQUIRED HYPOTHYROIDISM: ICD-10-CM

## 2023-11-27 RX ORDER — LEVOTHYROXINE SODIUM 88 UG/1
TABLET ORAL
Qty: 50 TABLET | Refills: 1 | Status: SHIPPED | OUTPATIENT
Start: 2023-11-27 | End: 2023-12-01 | Stop reason: SDUPTHER

## 2023-11-28 ENCOUNTER — TELEPHONE (OUTPATIENT)
Facility: CLINIC | Age: 58
End: 2023-11-28

## 2023-11-28 DIAGNOSIS — F31.5 BIPOLAR AFFECTIVE DISORDER, DEPRESSED, SEVERE, WITH PSYCHOTIC BEHAVIOR (HCC): ICD-10-CM

## 2023-11-28 DIAGNOSIS — E03.9 ACQUIRED HYPOTHYROIDISM: ICD-10-CM

## 2023-11-28 DIAGNOSIS — F31.5 BIPOLAR AFFECTIVE DISORDER, DEPRESSED, SEVERE, WITH PSYCHOTIC BEHAVIOR (HCC): Primary | ICD-10-CM

## 2023-11-28 RX ORDER — METHYLPHENIDATE HYDROCHLORIDE 5 MG/1
5 TABLET, CHEWABLE ORAL DAILY
Qty: 30 TABLET | Refills: 0 | Status: SHIPPED | OUTPATIENT
Start: 2024-01-27 | End: 2023-12-01 | Stop reason: CLARIF

## 2023-11-28 NOTE — TELEPHONE ENCOUNTER
Pt needs refills called in to [facility]. She is out of these meds.    Levothyroxine 88mcg  methylphenidate (RITALIN) 5 MG tablet [[id number]]  DISCONTINUED

## 2023-11-30 NOTE — TELEPHONE ENCOUNTER
Pt  calling about refill for ritalin, he would like to know why there is not a refill for this month but there is one for next month? Please call him at [phone]

## 2023-12-01 RX ORDER — LEVOTHYROXINE SODIUM 88 UG/1
TABLET ORAL
Qty: 50 TABLET | Refills: 1 | Status: SHIPPED | OUTPATIENT
Start: 2023-12-01

## 2023-12-01 RX ORDER — METHYLPHENIDATE HYDROCHLORIDE 5 MG/1
5 TABLET, CHEWABLE ORAL DAILY
Qty: 30 TABLET | Refills: 0 | Status: SHIPPED | OUTPATIENT
Start: 2023-12-01 | End: 2023-12-05

## 2023-12-01 RX ORDER — LEVOTHYROXINE SODIUM 0.07 MG/1
TABLET ORAL
Qty: 45 TABLET | Refills: 1 | Status: SHIPPED | OUTPATIENT
Start: 2023-12-01 | End: 2024-01-23 | Stop reason: SDUPTHER

## 2023-12-05 DIAGNOSIS — F31.5 BIPOLAR AFFECTIVE DISORDER, DEPRESSED, SEVERE, WITH PSYCHOTIC BEHAVIOR (HCC): Primary | ICD-10-CM

## 2023-12-05 RX ORDER — METHYLPHENIDATE HYDROCHLORIDE 5 MG/1
5 TABLET ORAL DAILY
Qty: 30 TABLET | Refills: 0 | Status: SHIPPED | OUTPATIENT
Start: 2023-12-05 | End: 2024-01-04

## 2023-12-07 ENCOUNTER — TELEPHONE (OUTPATIENT)
Age: 58
End: 2023-12-07

## 2023-12-07 NOTE — TELEPHONE ENCOUNTER
PA request received for Methylphenidate HCl 5MG chewable tablets. Pharmacy called Pharmacist,Tierra stated script was changed by provider. Tablets ready for pickup. Please note.

## 2023-12-15 DIAGNOSIS — F31.76 BIPOLAR DISORDER, IN FULL REMISSION, MOST RECENT EPISODE DEPRESSED (HCC): ICD-10-CM

## 2023-12-15 RX ORDER — MIRTAZAPINE 15 MG/1
15 TABLET, FILM COATED ORAL NIGHTLY
Qty: 90 TABLET | Refills: 1 | Status: SHIPPED | OUTPATIENT
Start: 2023-12-15

## 2023-12-28 DIAGNOSIS — G40.409 TONIC SEIZURE DISORDER (HCC): ICD-10-CM

## 2023-12-29 DIAGNOSIS — G40.409 TONIC SEIZURE DISORDER (HCC): ICD-10-CM

## 2023-12-29 RX ORDER — TOPIRAMATE 50 MG/1
50 TABLET, FILM COATED ORAL 2 TIMES DAILY
Qty: 180 TABLET | Refills: 3 | OUTPATIENT
Start: 2023-12-29

## 2023-12-29 RX ORDER — TOPIRAMATE 50 MG/1
50 TABLET, FILM COATED ORAL 2 TIMES DAILY
Qty: 180 TABLET | Refills: 3 | Status: SHIPPED | OUTPATIENT
Start: 2023-12-29

## 2024-01-03 ENCOUNTER — TELEPHONE (OUTPATIENT)
Age: 59
End: 2024-01-03

## 2024-01-03 NOTE — TELEPHONE ENCOUNTER
Refill request (pt spouse calling)    methylphenidate (RITALIN) 5 MG [facility] on [address]

## 2024-01-05 NOTE — TELEPHONE ENCOUNTER
Pt  calling in reference to refill, I told him since Dr Cook is out this week for a family emergency, we have sent this to Catherine for evaluation

## 2024-01-07 DIAGNOSIS — F31.9 BIPOLAR AFFECTIVE DISORDER, REMISSION STATUS UNSPECIFIED (HCC): Primary | ICD-10-CM

## 2024-01-07 RX ORDER — METHYLPHENIDATE HYDROCHLORIDE 5 MG/1
5 TABLET ORAL EVERY MORNING
Qty: 30 TABLET | Refills: 0 | Status: SHIPPED | OUTPATIENT
Start: 2024-01-07 | End: 2024-02-06

## 2024-01-23 ENCOUNTER — TELEMEDICINE (OUTPATIENT)
Facility: CLINIC | Age: 59
End: 2024-01-23
Payer: COMMERCIAL

## 2024-01-23 DIAGNOSIS — I10 ESSENTIAL HYPERTENSION: ICD-10-CM

## 2024-01-23 DIAGNOSIS — E03.9 ACQUIRED HYPOTHYROIDISM: ICD-10-CM

## 2024-01-23 DIAGNOSIS — F41.1 GAD (GENERALIZED ANXIETY DISORDER): ICD-10-CM

## 2024-01-23 DIAGNOSIS — F31.9 BIPOLAR AFFECTIVE DISORDER, REMISSION STATUS UNSPECIFIED (HCC): Primary | ICD-10-CM

## 2024-01-23 PROCEDURE — 99214 OFFICE O/P EST MOD 30 MIN: CPT | Performed by: FAMILY MEDICINE

## 2024-01-23 RX ORDER — LEVOTHYROXINE SODIUM 0.07 MG/1
TABLET ORAL
Qty: 45 TABLET | Refills: 1 | Status: SHIPPED | OUTPATIENT
Start: 2024-01-23

## 2024-01-23 RX ORDER — BUSPIRONE HYDROCHLORIDE 30 MG/1
30 TABLET ORAL 2 TIMES DAILY
Qty: 180 TABLET | Refills: 1 | Status: SHIPPED | OUTPATIENT
Start: 2024-01-23

## 2024-01-23 RX ORDER — METHYLPHENIDATE HYDROCHLORIDE 5 MG/1
5 TABLET ORAL EVERY MORNING
Qty: 30 TABLET | Refills: 0 | Status: SHIPPED | OUTPATIENT
Start: 2024-01-23 | End: 2024-02-22
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
## 2024-01-23 NOTE — PROGRESS NOTES
Chief Complaint   Patient presents with    Medication Refill     Patient is aware that this is a Virtual Visit or Phone Call Only doctor's visit.    Patient has not been out of the country in (14 months), NO diarrhea, NO cough, NO chest conjestion, NO temp.  Pt has not been around anyone with these symptoms.     Health Maintenance reviewed.    I have reviewed the patient's medical history in detail and updated the computerized patient record.    Have you been to the ER, urgent care clinic since your last visit?  No Hospitalized since your last visit?  No     2.  Have you seen or consulted any other health care providers outside of the [facility] since your last visit? No  Include any pap smears or colon screening.     Encouraged pt to discuss pt's wishes with spouse/partner/family and bring them in the next appt to follow thru with the Advanced Directive

## 2024-01-26 DIAGNOSIS — Z12.31 ENCOUNTER FOR SCREENING MAMMOGRAM FOR MALIGNANT NEOPLASM OF BREAST: Primary | ICD-10-CM

## 2024-02-01 DIAGNOSIS — Z12.39 ENCOUNTER FOR SCREENING FOR MALIGNANT NEOPLASM OF BREAST, UNSPECIFIED SCREENING MODALITY: Primary | ICD-10-CM

## 2024-02-02 LAB
BUN SERPL-MCNC: 23 MG/DL (ref 6–24)
BUN/CREAT SERPL: 34 (ref 9–23)
CALCIUM SERPL-MCNC: 10 MG/DL (ref 8.7–10.2)
CHLORIDE SERPL-SCNC: 102 MMOL/L (ref 96–106)
CO2 SERPL-SCNC: 23 MMOL/L (ref 20–29)
CREAT SERPL-MCNC: 0.68 MG/DL (ref 0.57–1)
EGFRCR SERPLBLD CKD-EPI 2021: 100 ML/MIN/1.73
GLUCOSE SERPL-MCNC: 95 MG/DL (ref 70–99)
POTASSIUM SERPL-SCNC: 4.6 MMOL/L (ref 3.5–5.2)
SODIUM SERPL-SCNC: 141 MMOL/L (ref 134–144)
TSH SERPL DL<=0.005 MIU/L-ACNC: 0.89 UIU/ML (ref 0.45–4.5)

## 2024-03-05 ENCOUNTER — TELEPHONE (OUTPATIENT)
Facility: CLINIC | Age: 59
End: 2024-03-05

## 2024-03-06 DIAGNOSIS — F31.5 BIPOLAR AFFECTIVE DISORDER, DEPRESSED, SEVERE, WITH PSYCHOTIC BEHAVIOR (HCC): Primary | ICD-10-CM

## 2024-03-06 RX ORDER — METHYLPHENIDATE HYDROCHLORIDE 5 MG/1
5 TABLET ORAL DAILY
Qty: 30 TABLET | Refills: 0 | Status: SHIPPED | OUTPATIENT
Start: 2024-03-06 | End: 2024-04-05

## 2024-03-26 LAB — MAMMOGRAPHY, EXTERNAL: NORMAL

## 2024-04-05 DIAGNOSIS — F31.5 BIPOLAR AFFECTIVE DISORDER, DEPRESSED, SEVERE, WITH PSYCHOTIC BEHAVIOR (HCC): ICD-10-CM

## 2024-04-05 RX ORDER — METHYLPHENIDATE HYDROCHLORIDE 5 MG/1
5 TABLET ORAL DAILY
Qty: 10 TABLET | Refills: 0 | Status: SHIPPED | OUTPATIENT
Start: 2024-04-05 | End: 2024-04-15
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
## 2024-04-15 SDOH — HEALTH STABILITY: PHYSICAL HEALTH: ON AVERAGE, HOW MANY MINUTES DO YOU ENGAGE IN EXERCISE AT THIS LEVEL?: 20 MIN

## 2024-04-15 SDOH — HEALTH STABILITY: PHYSICAL HEALTH: ON AVERAGE, HOW MANY DAYS PER WEEK DO YOU ENGAGE IN MODERATE TO STRENUOUS EXERCISE (LIKE A BRISK WALK)?: 5 DAYS

## 2024-04-15 SDOH — ECONOMIC STABILITY: HOUSING INSECURITY
IN THE LAST 12 MONTHS, WAS THERE A TIME WHEN YOU DID NOT HAVE A STEADY PLACE TO SLEEP OR SLEPT IN A SHELTER (INCLUDING NOW)?: PATIENT DECLINED

## 2024-04-15 SDOH — ECONOMIC STABILITY: INCOME INSECURITY: HOW HARD IS IT FOR YOU TO PAY FOR THE VERY BASICS LIKE FOOD, HOUSING, MEDICAL CARE, AND HEATING?: PATIENT DECLINED

## 2024-04-15 SDOH — ECONOMIC STABILITY: TRANSPORTATION INSECURITY
IN THE PAST 12 MONTHS, HAS LACK OF TRANSPORTATION KEPT YOU FROM MEETINGS, WORK, OR FROM GETTING THINGS NEEDED FOR DAILY LIVING?: PATIENT DECLINED

## 2024-04-15 SDOH — ECONOMIC STABILITY: FOOD INSECURITY: WITHIN THE PAST 12 MONTHS, THE FOOD YOU BOUGHT JUST DIDN'T LAST AND YOU DIDN'T HAVE MONEY TO GET MORE.: PATIENT DECLINED

## 2024-04-15 SDOH — ECONOMIC STABILITY: FOOD INSECURITY: WITHIN THE PAST 12 MONTHS, YOU WORRIED THAT YOUR FOOD WOULD RUN OUT BEFORE YOU GOT MONEY TO BUY MORE.: PATIENT DECLINED

## 2024-04-15 ASSESSMENT — PATIENT HEALTH QUESTIONNAIRE - PHQ9
1. LITTLE INTEREST OR PLEASURE IN DOING THINGS: NOT AT ALL
SUM OF ALL RESPONSES TO PHQ QUESTIONS 1-9: 0
SUM OF ALL RESPONSES TO PHQ9 QUESTIONS 1 & 2: 0
2. FEELING DOWN, DEPRESSED OR HOPELESS: NOT AT ALL

## 2024-04-15 ASSESSMENT — LIFESTYLE VARIABLES
HOW MANY STANDARD DRINKS CONTAINING ALCOHOL DO YOU HAVE ON A TYPICAL DAY: PATIENT DOES NOT DRINK
HOW OFTEN DO YOU HAVE A DRINK CONTAINING ALCOHOL: NEVER
HOW OFTEN DO YOU HAVE A DRINK CONTAINING ALCOHOL: 1
HOW MANY STANDARD DRINKS CONTAINING ALCOHOL DO YOU HAVE ON A TYPICAL DAY: 0
HOW OFTEN DO YOU HAVE SIX OR MORE DRINKS ON ONE OCCASION: 1
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
## 2024-04-18 ENCOUNTER — OFFICE VISIT (OUTPATIENT)
Facility: CLINIC | Age: 59
End: 2024-04-18

## 2024-04-18 DIAGNOSIS — Z00.00 ENCOUNTER FOR MEDICARE ANNUAL WELLNESS EXAM: ICD-10-CM

## 2024-04-18 DIAGNOSIS — F31.76 BIPOLAR DISORDER, IN FULL REMISSION, MOST RECENT EPISODE DEPRESSED (HCC): Primary | ICD-10-CM

## 2024-04-18 DIAGNOSIS — F41.1 GAD (GENERALIZED ANXIETY DISORDER): ICD-10-CM

## 2024-04-18 DIAGNOSIS — F31.5 BIPOLAR DISORDER, CURRENT EPISODE DEPRESSED, SEVERE, WITH PSYCHOTIC FEATURES (HCC): ICD-10-CM

## 2024-04-18 DIAGNOSIS — G40.409 TONIC SEIZURE DISORDER (HCC): ICD-10-CM

## 2024-04-18 RX ORDER — MIRTAZAPINE 15 MG/1
15 TABLET, FILM COATED ORAL NIGHTLY
Qty: 90 TABLET | Refills: 2 | Status: SHIPPED | OUTPATIENT
Start: 2024-04-18

## 2024-04-18 RX ORDER — METHYLPHENIDATE HYDROCHLORIDE 5 MG/1
5 TABLET ORAL DAILY
Qty: 30 TABLET | Refills: 0 | Status: SHIPPED | OUTPATIENT
Start: 2024-04-18 | End: 2024-05-18

## 2024-04-18 RX ORDER — BUSPIRONE HYDROCHLORIDE 30 MG/1
30 TABLET ORAL 2 TIMES DAILY
Qty: 180 TABLET | Refills: 2 | Status: SHIPPED | OUTPATIENT
Start: 2024-04-18

## 2024-04-18 SDOH — ECONOMIC STABILITY: FOOD INSECURITY: WITHIN THE PAST 12 MONTHS, THE FOOD YOU BOUGHT JUST DIDN'T LAST AND YOU DIDN'T HAVE MONEY TO GET MORE.: NEVER TRUE

## 2024-04-18 SDOH — ECONOMIC STABILITY: FOOD INSECURITY: WITHIN THE PAST 12 MONTHS, YOU WORRIED THAT YOUR FOOD WOULD RUN OUT BEFORE YOU GOT MONEY TO BUY MORE.: NEVER TRUE

## 2024-04-18 SDOH — ECONOMIC STABILITY: HOUSING INSECURITY
IN THE LAST 12 MONTHS, WAS THERE A TIME WHEN YOU DID NOT HAVE A STEADY PLACE TO SLEEP OR SLEPT IN A SHELTER (INCLUDING NOW)?: NO

## 2024-04-18 SDOH — ECONOMIC STABILITY: INCOME INSECURITY: HOW HARD IS IT FOR YOU TO PAY FOR THE VERY BASICS LIKE FOOD, HOUSING, MEDICAL CARE, AND HEATING?: NOT HARD AT ALL

## 2024-04-18 ASSESSMENT — ANXIETY QUESTIONNAIRES
2. NOT BEING ABLE TO STOP OR CONTROL WORRYING: SEVERAL DAYS
1. FEELING NERVOUS, ANXIOUS, OR ON EDGE: SEVERAL DAYS
IF YOU CHECKED OFF ANY PROBLEMS ON THIS QUESTIONNAIRE, HOW DIFFICULT HAVE THESE PROBLEMS MADE IT FOR YOU TO DO YOUR WORK, TAKE CARE OF THINGS AT HOME, OR GET ALONG WITH OTHER PEOPLE: NOT DIFFICULT AT ALL

## 2024-04-18 NOTE — PROGRESS NOTES
Chief Complaint   Patient presents with    Medicare AWV     Clock Test was done     Patient has not been out of the country in (14 months), NO diarrhea, NO cough, NO chest conjestion, NO temp.  Pt has not been around anyone with these symptoms.     Health Maintenance reviewed.    I have reviewed the patient's medical history in detail and updated the computerized patient record.    \"Have you been to the ER, urgent care clinic since your last visit? No  Hospitalized since your last visit?\"    no    “Have you seen or consulted any other health care providers outside of [facility] since your last visit?”    no       Have you had a mammogram?”   no    Date of last Mammogram: 2/26/2021

## 2024-05-20 ENCOUNTER — TELEPHONE (OUTPATIENT)
Facility: CLINIC | Age: 59
End: 2024-05-20
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
## 2024-05-21 ENCOUNTER — TELEMEDICINE (OUTPATIENT)
Facility: CLINIC | Age: 59
End: 2024-05-21
Payer: COMMERCIAL

## 2024-05-21 DIAGNOSIS — E03.9 ACQUIRED HYPOTHYROIDISM: ICD-10-CM

## 2024-05-21 DIAGNOSIS — F31.5 BIPOLAR AFFECTIVE DISORDER, DEPRESSED, SEVERE, WITH PSYCHOTIC BEHAVIOR (HCC): ICD-10-CM

## 2024-05-21 DIAGNOSIS — I10 ESSENTIAL HYPERTENSION: ICD-10-CM

## 2024-05-21 DIAGNOSIS — G40.409 TONIC SEIZURE DISORDER (HCC): ICD-10-CM

## 2024-05-21 PROCEDURE — 99214 OFFICE O/P EST MOD 30 MIN: CPT | Performed by: FAMILY MEDICINE

## 2024-05-21 RX ORDER — METHYLPHENIDATE HYDROCHLORIDE 5 MG/1
5 TABLET ORAL EVERY MORNING
Qty: 35 TABLET | Refills: 0 | Status: SHIPPED | OUTPATIENT
Start: 2024-07-20 | End: 2024-05-25 | Stop reason: SDUPTHER

## 2024-05-21 RX ORDER — LEVOTHYROXINE SODIUM 88 UG/1
TABLET ORAL
Qty: 50 TABLET | Refills: 1 | Status: SHIPPED | OUTPATIENT
Start: 2024-05-21

## 2024-05-21 RX ORDER — LEVOTHYROXINE SODIUM 0.07 MG/1
TABLET ORAL
Qty: 45 TABLET | Refills: 1 | Status: SHIPPED | OUTPATIENT
Start: 2024-05-21

## 2024-05-21 ASSESSMENT — PATIENT HEALTH QUESTIONNAIRE - PHQ9
1. LITTLE INTEREST OR PLEASURE IN DOING THINGS: SEVERAL DAYS
SUM OF ALL RESPONSES TO PHQ QUESTIONS 1-9: 2
SUM OF ALL RESPONSES TO PHQ9 QUESTIONS 1 & 2: 2
2. FEELING DOWN, DEPRESSED OR HOPELESS: SEVERAL DAYS

## 2024-05-21 NOTE — PROGRESS NOTES
Chief Complaint   Patient presents with    Medication Refill     Patient is aware that this is a Virtual Visit or Phone Call Only doctor's visit.    Patient has not been out of the country in (14 months), NO diarrhea, NO cough, NO chest conjestion, NO temp.  Pt has not been around anyone with these symptoms.     Health Maintenance reviewed.    I have reviewed the patient's medical history in detail and updated the computerized patient record.    Have you been to the ER, urgent care clinic since your last visit? No  Hospitalized since your last visit?  No     2.  Have you seen or consulted any other health care providers outside of the [facility] since your last visit? No   Include any pap smears or colon screening.     Encouraged pt to discuss pt's wishes with spouse/partner/family and bring them in the next appt to follow thru with the Advanced Directive

## 2024-05-24 ENCOUNTER — TELEPHONE (OUTPATIENT)
Facility: CLINIC | Age: 59
End: 2024-05-24

## 2024-05-24 NOTE — TELEPHONE ENCOUNTER
Pt  calling to see why on script it says it cannot be dispensed until July. Please rectify this asap, pt needs this med     methylphenidate (RITALIN) 5 MG tablet

## 2024-05-25 DIAGNOSIS — F31.5 BIPOLAR AFFECTIVE DISORDER, DEPRESSED, SEVERE, WITH PSYCHOTIC BEHAVIOR (HCC): ICD-10-CM

## 2024-05-25 RX ORDER — METHYLPHENIDATE HYDROCHLORIDE 5 MG/1
5 TABLET ORAL EVERY MORNING
Qty: 35 TABLET | Refills: 0 | Status: SHIPPED | OUTPATIENT
Start: 2024-05-25 | End: 2024-06-29

## 2024-06-27 DIAGNOSIS — F31.5 BIPOLAR AFFECTIVE DISORDER, DEPRESSED, SEVERE, WITH PSYCHOTIC BEHAVIOR (HCC): ICD-10-CM

## 2024-06-28 RX ORDER — METHYLPHENIDATE HYDROCHLORIDE 5 MG/1
5 TABLET ORAL EVERY MORNING
Qty: 35 TABLET | Refills: 0 | Status: SHIPPED | OUTPATIENT
Start: 2024-06-28 | End: 2024-08-02

## 2024-07-11 DIAGNOSIS — G40.409 TONIC SEIZURE DISORDER (HCC): ICD-10-CM

## 2024-07-17 NOTE — PROGRESS NOTES
Gayla Dash, was evaluated through a synchronous (real-time) audio-video encounter. The patient (or guardian if applicable) is aware that this is a billable service, which includes applicable co-pays. This Virtual Visit was conducted with patient's (and/or legal guardian's) consent. Patient identification was verified, and a caregiver was present when appropriate.   The patient was located at Home: [address] [place] [id number]  Provider was located at Facility (Appt Dept): [address] [place] [address] [address]  [place] [address]  Confirm you are appropriately licensed, registered, or certified to deliver care in the state where the patient is located as indicated above. If you are not or unsure, please re-schedule the visit: Yes, I confirm.     Gayla Dash (:  1965) is a Established patient, presenting virtually for evaluation of the following:  meds    Assessment & Plan   Below is the assessment and plan developed based on review of pertinent history, physical exam, labs, studies, and medications.  Assessment & Plan  1. Mood disorder.  Prescriptions for Ritalin and Depakote have been refilled. Laboratory tests have been ordered to be completed prior to the next visit. When she experience a slight decrease in Ritalin supply, she is advised to contact me for a prescription refill.    Follow-up  A follow-up appointment is scheduled for 3 months from now.    Results    1. Bipolar affective disorder, depressed, severe, with psychotic behavior (HCC)  -     methylphenidate (RITALIN) 5 MG tablet; Take 1 tablet by mouth every morning for 35 days. Max Daily Amount: 5 mg, Disp-35 tablet, R-0Normal  2. Tonic seizure disorder (HCC)  -     divalproex (DEPAKOTE) 125 MG DR tablet; Take 1 tablet by mouth 2 times daily, Disp-200 tablet, R-1Normal  -     divalproex (DEPAKOTE) 250 MG DR tablet; Take 1 tablet by mouth 2 times daily, Disp-200 tablet, R-1Normal  3. Acquired hypothyroidism  -     TSH;

## 2024-07-18 ENCOUNTER — TELEMEDICINE (OUTPATIENT)
Facility: CLINIC | Age: 59
End: 2024-07-18
Payer: MEDICARE

## 2024-07-18 DIAGNOSIS — G40.409 TONIC SEIZURE DISORDER (HCC): ICD-10-CM

## 2024-07-18 DIAGNOSIS — E03.9 ACQUIRED HYPOTHYROIDISM: ICD-10-CM

## 2024-07-18 DIAGNOSIS — I10 ESSENTIAL HYPERTENSION: ICD-10-CM

## 2024-07-18 DIAGNOSIS — F31.5 BIPOLAR AFFECTIVE DISORDER, DEPRESSED, SEVERE, WITH PSYCHOTIC BEHAVIOR (HCC): Primary | ICD-10-CM

## 2024-07-18 PROCEDURE — 99214 OFFICE O/P EST MOD 30 MIN: CPT | Performed by: FAMILY MEDICINE

## 2024-07-18 PROCEDURE — G2211 COMPLEX E/M VISIT ADD ON: HCPCS | Performed by: FAMILY MEDICINE

## 2024-07-18 RX ORDER — METHYLPHENIDATE HYDROCHLORIDE 5 MG/1
5 TABLET ORAL EVERY MORNING
Qty: 35 TABLET | Refills: 0 | Status: SHIPPED | OUTPATIENT
Start: 2024-07-18 | End: 2024-08-22

## 2024-07-18 RX ORDER — DIVALPROEX SODIUM 125 MG/1
125 TABLET, DELAYED RELEASE ORAL 2 TIMES DAILY
Qty: 200 TABLET | Refills: 1 | Status: SHIPPED | OUTPATIENT
Start: 2024-07-18

## 2024-07-18 RX ORDER — DIVALPROEX SODIUM 250 MG/1
250 TABLET, DELAYED RELEASE ORAL 2 TIMES DAILY
Qty: 180 TABLET | Refills: 3 | OUTPATIENT
Start: 2024-07-18

## 2024-07-18 RX ORDER — DIVALPROEX SODIUM 250 MG/1
250 TABLET, DELAYED RELEASE ORAL 2 TIMES DAILY
Qty: 200 TABLET | Refills: 1 | Status: SHIPPED | OUTPATIENT
Start: 2024-07-18

## 2024-07-18 ASSESSMENT — PATIENT HEALTH QUESTIONNAIRE - PHQ9
2. FEELING DOWN, DEPRESSED OR HOPELESS: SEVERAL DAYS
SUM OF ALL RESPONSES TO PHQ9 QUESTIONS 1 & 2: 4
SUM OF ALL RESPONSES TO PHQ QUESTIONS 1-9: 1
SUM OF ALL RESPONSES TO PHQ QUESTIONS 1-9: 1
SUM OF ALL RESPONSES TO PHQ QUESTIONS 1-9: 4
SUM OF ALL RESPONSES TO PHQ QUESTIONS 1-9: 1
SUM OF ALL RESPONSES TO PHQ QUESTIONS 1-9: 4
2. FEELING DOWN, DEPRESSED OR HOPELESS: MORE THAN HALF THE DAYS
SUM OF ALL RESPONSES TO PHQ QUESTIONS 1-9: 4
SUM OF ALL RESPONSES TO PHQ QUESTIONS 1-9: 4
SUM OF ALL RESPONSES TO PHQ QUESTIONS 1-9: 1
1. LITTLE INTEREST OR PLEASURE IN DOING THINGS: MORE THAN HALF THE DAYS
10. IF YOU CHECKED OFF ANY PROBLEMS, HOW DIFFICULT HAVE THESE PROBLEMS MADE IT FOR YOU TO DO YOUR WORK, TAKE CARE OF THINGS AT HOME, OR GET ALONG WITH OTHER PEOPLE: SOMEWHAT DIFFICULT

## 2024-07-18 NOTE — PROGRESS NOTES
Chief Complaint   Patient presents with    Medication Refill     Patient is aware that this is a Virtual Visit or Phone Call Only doctor's visit.    Patient has not been out of the country in (14 months), NO diarrhea, NO cough, NO chest conjestion, NO temp.  Pt has not been around anyone with these symptoms.     Health Maintenance reviewed.    I have reviewed the patient's medical history in detail and updated the computerized patient record.    Have you been to the ER, urgent care clinic since your last visit?  Hospitalized since your last visit?      2.  Have you seen or consulted any other health care providers outside of the [facility] since your last visit?  Include any pap smears or colon screening.     Encouraged pt to discuss pt's wishes with spouse/partner/family and bring them in the next appt to follow thru with the Advanced Directive

## 2024-09-06 ENCOUNTER — TELEPHONE (OUTPATIENT)
Facility: CLINIC | Age: 59
End: 2024-09-06

## 2024-09-06 DIAGNOSIS — F31.76 BIPOLAR DISORDER, IN FULL REMISSION, MOST RECENT EPISODE DEPRESSED (HCC): ICD-10-CM

## 2024-09-06 NOTE — TELEPHONE ENCOUNTER
Pt  calling in refill to [facility]   methylphenidate (RITALIN) 5 MG tablet [[id number]]  DISCONTINUED

## 2024-09-16 RX ORDER — MIRTAZAPINE 15 MG/1
15 TABLET, FILM COATED ORAL NIGHTLY
Qty: 90 TABLET | Refills: 2 | Status: CANCELLED | OUTPATIENT
Start: 2024-09-16

## 2024-09-17 DIAGNOSIS — F31.5 BIPOLAR AFFECTIVE DISORDER, DEPRESSED, SEVERE, WITH PSYCHOTIC BEHAVIOR (HCC): Primary | ICD-10-CM

## 2024-09-17 RX ORDER — METHYLPHENIDATE HYDROCHLORIDE 5 MG/1
5 TABLET ORAL DAILY
Qty: 30 TABLET | Refills: 0 | Status: SHIPPED | OUTPATIENT
Start: 2024-09-17 | End: 2024-10-17

## 2024-09-24 DIAGNOSIS — E03.9 ACQUIRED HYPOTHYROIDISM: ICD-10-CM

## 2024-09-24 RX ORDER — LEVOTHYROXINE SODIUM 88 UG/1
TABLET ORAL
Qty: 50 TABLET | Refills: 1 | Status: SHIPPED | OUTPATIENT
Start: 2024-09-24

## 2024-10-11 DIAGNOSIS — G40.409 TONIC SEIZURE DISORDER (HCC): ICD-10-CM

## 2024-10-13 DIAGNOSIS — G40.409 TONIC SEIZURE DISORDER (HCC): ICD-10-CM

## 2024-10-14 ENCOUNTER — TELEPHONE (OUTPATIENT)
Facility: CLINIC | Age: 59
End: 2024-10-14

## 2024-10-14 NOTE — TELEPHONE ENCOUNTER
Pt need refill sent to [facility]  methylphenidate (RITALIN) 5 MG tablet       divalproex (DEPAKOTE) 125 MG DR tablet     divalproex (DEPAKOTE) 250 MG DR tablet       mirtazapine (REMERON) 15 MG tablet

## 2024-10-15 ENCOUNTER — TELEPHONE (OUTPATIENT)
Facility: CLINIC | Age: 59
End: 2024-10-15

## 2024-10-15 ENCOUNTER — TELEMEDICINE (OUTPATIENT)
Facility: CLINIC | Age: 59
End: 2024-10-15
Payer: MEDICARE

## 2024-10-15 DIAGNOSIS — F31.76 BIPOLAR DISORDER, IN FULL REMISSION, MOST RECENT EPISODE DEPRESSED (HCC): ICD-10-CM

## 2024-10-15 DIAGNOSIS — F41.1 GAD (GENERALIZED ANXIETY DISORDER): ICD-10-CM

## 2024-10-15 DIAGNOSIS — I10 ESSENTIAL HYPERTENSION: ICD-10-CM

## 2024-10-15 DIAGNOSIS — E03.9 ACQUIRED HYPOTHYROIDISM: ICD-10-CM

## 2024-10-15 DIAGNOSIS — F31.5 BIPOLAR AFFECTIVE DISORDER, DEPRESSED, SEVERE, WITH PSYCHOTIC BEHAVIOR (HCC): Primary | ICD-10-CM

## 2024-10-15 DIAGNOSIS — G40.409 TONIC SEIZURE DISORDER (HCC): ICD-10-CM

## 2024-10-15 PROCEDURE — G2211 COMPLEX E/M VISIT ADD ON: HCPCS | Performed by: FAMILY MEDICINE

## 2024-10-15 PROCEDURE — 99214 OFFICE O/P EST MOD 30 MIN: CPT | Performed by: FAMILY MEDICINE

## 2024-10-15 RX ORDER — DIVALPROEX SODIUM 125 MG/1
125 TABLET, DELAYED RELEASE ORAL 2 TIMES DAILY
Qty: 180 TABLET | OUTPATIENT
Start: 2024-10-15

## 2024-10-15 RX ORDER — DIVALPROEX SODIUM 250 MG/1
250 TABLET, DELAYED RELEASE ORAL 2 TIMES DAILY
Qty: 180 TABLET | OUTPATIENT
Start: 2024-10-15

## 2024-10-15 RX ORDER — TOPIRAMATE 50 MG/1
50 TABLET, FILM COATED ORAL 2 TIMES DAILY
Qty: 200 TABLET | Refills: 2 | Status: SHIPPED | OUTPATIENT
Start: 2024-10-15

## 2024-10-15 RX ORDER — MIRTAZAPINE 15 MG/1
15 TABLET, FILM COATED ORAL NIGHTLY
Qty: 100 TABLET | Refills: 2 | Status: SHIPPED | OUTPATIENT
Start: 2024-10-15

## 2024-10-15 RX ORDER — LEVOTHYROXINE SODIUM 75 UG/1
TABLET ORAL
Qty: 45 TABLET | Refills: 1 | Status: SHIPPED | OUTPATIENT
Start: 2024-10-15

## 2024-10-15 RX ORDER — DIVALPROEX SODIUM 250 MG/1
250 TABLET, DELAYED RELEASE ORAL 2 TIMES DAILY
Qty: 200 TABLET | Refills: 1 | Status: SHIPPED | OUTPATIENT
Start: 2024-10-15

## 2024-10-15 RX ORDER — DIVALPROEX SODIUM 125 MG/1
125 TABLET, DELAYED RELEASE ORAL 2 TIMES DAILY
Qty: 200 TABLET | Refills: 1 | Status: SHIPPED | OUTPATIENT
Start: 2024-10-15

## 2024-10-15 RX ORDER — LEVOTHYROXINE SODIUM 88 UG/1
TABLET ORAL
Qty: 50 TABLET | Refills: 1 | Status: SHIPPED | OUTPATIENT
Start: 2024-10-15

## 2024-10-15 RX ORDER — BUSPIRONE HYDROCHLORIDE 30 MG/1
30 TABLET ORAL 2 TIMES DAILY
Qty: 200 TABLET | Refills: 2 | Status: SHIPPED | OUTPATIENT
Start: 2024-10-15

## 2024-10-15 RX ORDER — METHYLPHENIDATE HYDROCHLORIDE 5 MG/1
5 TABLET ORAL DAILY
Qty: 30 TABLET | Refills: 0 | Status: SHIPPED | OUTPATIENT
Start: 2024-10-15 | End: 2024-11-14

## 2024-10-15 ASSESSMENT — PATIENT HEALTH QUESTIONNAIRE - PHQ9
SUM OF ALL RESPONSES TO PHQ QUESTIONS 1-9: 2
1. LITTLE INTEREST OR PLEASURE IN DOING THINGS: SEVERAL DAYS
SUM OF ALL RESPONSES TO PHQ QUESTIONS 1-9: 2
SUM OF ALL RESPONSES TO PHQ9 QUESTIONS 1 & 2: 2
SUM OF ALL RESPONSES TO PHQ QUESTIONS 1-9: 2
2. FEELING DOWN, DEPRESSED OR HOPELESS: SEVERAL DAYS
SUM OF ALL RESPONSES TO PHQ QUESTIONS 1-9: 2

## 2024-10-15 NOTE — PROGRESS NOTES
Chief Complaint   Patient presents with    Medication Refill     Patient has not been out of the country in (14 months), NO diarrhea, NO cough, NO chest conjestion, NO temp.  Pt has not been around anyone with these symptoms.     Health Maintenance reviewed.    I have reviewed the patient's medical history in detail and updated the computerized patient record.    \"Have you been to the ER, urgent care clinic since your last visit?  No Hospitalized since your last visit?\"    no    “Have you seen or consulted any other health care providers outside of [facility] since your last visit?”    no                                         
Active (4/15/2024)    Exercise Vital Sign     Days of Exercise per Week: 5 days     Minutes of Exercise per Session: 20 min   Stress: Not on file   Social Connections: Not on file   Intimate Partner Violence: Not on file   Housing Stability: Unknown (4/18/2024)    Housing Stability Vital Sign     Unable to Pay for Housing in the Last Year: Not on file     Number of Places Lived in the Last Year: Not on file     Unstable Housing in the Last Year: No       Psychiatric ROS:   Reports taking psychiatric medications without side affects. No complaints of abnormal movements, suicidal ideation or focal weakness.     Objective   There were no vitals taken for this visit.  Physical Exam           The patient (or guardian, if applicable) and other individuals in attendance with the patient were advised that Artificial Intelligence will be utilized during this visit to record and process the conversation to generate a clinical note. The patient (or guardian, if applicable) and other individuals in attendance at the appointment consented to the use of AI, including the recording.      Gayla LOMAX Dash, was evaluated through a synchronous (real-time) audio-video encounter. The patient (or guardian if applicable) is aware that this is a billable service, which includes applicable co-pays. This Virtual Visit was conducted with patient's (and/or legal guardian's) consent. Patient identification was verified, and a caregiver was present when appropriate.   The patient was located at Home: [address] [place] [phone]  Provider was located at Facility (Appt Dept): [address] [place] [address] [address]  [place] [address]  Confirm you are appropriately licensed, registered, or certified to deliver care in the state where the patient is located as indicated above. If you are not or unsure, please re-schedule the visit: Yes, I confirm.        Total time spent for this encounter: Not billed by time    --Jay Cook MD on

## 2024-11-15 DIAGNOSIS — F31.5 BIPOLAR AFFECTIVE DISORDER, DEPRESSED, SEVERE, WITH PSYCHOTIC BEHAVIOR (HCC): Primary | ICD-10-CM

## 2024-11-15 RX ORDER — METHYLPHENIDATE HYDROCHLORIDE 5 MG/1
5 TABLET ORAL DAILY
Qty: 35 TABLET | Refills: 0 | Status: SHIPPED | OUTPATIENT
Start: 2024-11-15 | End: 2024-12-20

## 2024-12-16 ENCOUNTER — TELEPHONE (OUTPATIENT)
Facility: CLINIC | Age: 59
End: 2024-12-16

## 2024-12-16 DIAGNOSIS — F31.5 BIPOLAR AFFECTIVE DISORDER, DEPRESSED, SEVERE, WITH PSYCHOTIC BEHAVIOR (HCC): ICD-10-CM

## 2024-12-16 RX ORDER — METHYLPHENIDATE HYDROCHLORIDE 5 MG/1
5 TABLET ORAL DAILY
Qty: 35 TABLET | Refills: 0 | Status: CANCELLED | OUTPATIENT
Start: 2024-12-16 | End: 2025-01-20

## 2024-12-17 DIAGNOSIS — F31.5 BIPOLAR AFFECTIVE DISORDER, DEPRESSED, SEVERE, WITH PSYCHOTIC BEHAVIOR (HCC): ICD-10-CM

## 2024-12-17 RX ORDER — METHYLPHENIDATE HYDROCHLORIDE 5 MG/1
5 TABLET ORAL DAILY
Qty: 35 TABLET | Refills: 0 | Status: SHIPPED | OUTPATIENT
Start: 2024-12-17 | End: 2025-01-21

## 2024-12-26 DIAGNOSIS — G40.409 TONIC SEIZURE DISORDER (HCC): ICD-10-CM

## 2024-12-26 RX ORDER — TOPIRAMATE 50 MG/1
50 TABLET, FILM COATED ORAL 2 TIMES DAILY
Qty: 180 TABLET | OUTPATIENT
Start: 2024-12-26

## 2024-12-27 ENCOUNTER — TELEPHONE (OUTPATIENT)
Facility: CLINIC | Age: 59
End: 2024-12-27

## 2024-12-30 DIAGNOSIS — G40.409 TONIC SEIZURE DISORDER (HCC): ICD-10-CM

## 2024-12-30 RX ORDER — TOPIRAMATE 50 MG/1
50 TABLET, FILM COATED ORAL 2 TIMES DAILY
Qty: 200 TABLET | Refills: 2 | Status: SHIPPED | OUTPATIENT
Start: 2024-12-30

## 2025-01-15 ENCOUNTER — TELEPHONE (OUTPATIENT)
Facility: CLINIC | Age: 60
End: 2025-01-15

## 2025-01-15 DIAGNOSIS — F31.76 BIPOLAR DISORDER, IN FULL REMISSION, MOST RECENT EPISODE DEPRESSED: ICD-10-CM

## 2025-01-15 DIAGNOSIS — F31.5 BIPOLAR AFFECTIVE DISORDER, DEPRESSED, SEVERE, WITH PSYCHOTIC BEHAVIOR (HCC): ICD-10-CM

## 2025-01-15 RX ORDER — MIRTAZAPINE 15 MG/1
15 TABLET, FILM COATED ORAL NIGHTLY
Qty: 100 TABLET | Refills: 2 | Status: CANCELLED | OUTPATIENT
Start: 2025-01-15

## 2025-01-15 RX ORDER — METHYLPHENIDATE HYDROCHLORIDE 5 MG/1
5 TABLET ORAL DAILY
Qty: 35 TABLET | Refills: 0 | Status: CANCELLED | OUTPATIENT
Start: 2025-01-15 | End: 2025-02-19

## 2025-01-15 NOTE — TELEPHONE ENCOUNTER
Pt  calling in for refills to go to [facility]    mirtazapine (REMERON) 15 MG tablet     methylphenidate (RITALIN) 5 MG tablet

## 2025-01-16 PROBLEM — F32.A DEPRESSION: Status: RESOLVED | Noted: 2019-08-01 | Resolved: 2025-01-16

## 2025-01-16 PROBLEM — F31.5 BIPOLAR AFFECTIVE DISORDER, DEPRESSED, SEVERE, WITH PSYCHOTIC BEHAVIOR (HCC): Status: RESOLVED | Noted: 2018-12-20 | Resolved: 2025-01-16

## 2025-01-16 PROBLEM — Z91.51 HX OF SUICIDE ATTEMPT: Status: ACTIVE | Noted: 2025-01-16
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
## 2025-01-17 ENCOUNTER — TELEMEDICINE (OUTPATIENT)
Facility: CLINIC | Age: 60
End: 2025-01-17

## 2025-01-17 DIAGNOSIS — I10 ESSENTIAL HYPERTENSION: ICD-10-CM

## 2025-01-17 DIAGNOSIS — G40.409 TONIC SEIZURE DISORDER (HCC): Primary | ICD-10-CM

## 2025-01-17 DIAGNOSIS — F43.10 PTSD (POST-TRAUMATIC STRESS DISORDER): ICD-10-CM

## 2025-01-17 DIAGNOSIS — F31.5 BIPOLAR AFFECTIVE DISORDER, DEPRESSED, SEVERE, WITH PSYCHOTIC BEHAVIOR (HCC): ICD-10-CM

## 2025-01-17 DIAGNOSIS — Z91.51 HX OF SUICIDE ATTEMPT: ICD-10-CM

## 2025-01-17 DIAGNOSIS — E03.9 ACQUIRED HYPOTHYROIDISM: ICD-10-CM

## 2025-01-17 RX ORDER — METHYLPHENIDATE HYDROCHLORIDE 5 MG/1
5 TABLET ORAL DAILY
Qty: 50 TABLET | Refills: 0 | Status: SHIPPED | OUTPATIENT
Start: 2025-01-17 | End: 2025-03-08

## 2025-01-17 NOTE — PROGRESS NOTES
Chief Complaint   Patient presents with    Anxiety     3 month follow up    Medication Refill       
Prescription    Sexual activity: Not on file   Other Topics Concern    Not on file   Social History Narrative    Lives with  aide stays with her in the day     Social Determinants of Health     Financial Resource Strain: Low Risk  (4/18/2024)    Overall Financial Resource Strain (CARDIA)     Difficulty of Paying Living Expenses: Not hard at all   Food Insecurity: No Food Insecurity (4/18/2024)    Hunger Vital Sign     Worried About Running Out of Food in the Last Year: Never true     Ran Out of Food in the Last Year: Never true   Transportation Needs: Unknown (4/18/2024)    PRAPARE - Transportation     Lack of Transportation (Medical): Not on file     Lack of Transportation (Non-Medical): No   Physical Activity: Insufficiently Active (4/15/2024)    Exercise Vital Sign     Days of Exercise per Week: 5 days     Minutes of Exercise per Session: 20 min   Stress: Not on file   Social Connections: Not on file   Intimate Partner Violence: Not on file   Housing Stability: Unknown (4/18/2024)    Housing Stability Vital Sign     Unable to Pay for Housing in the Last Year: Not on file     Number of Places Lived in the Last Year: Not on file     Unstable Housing in the Last Year: No       Psychiatric ROS:   Reports taking psychiatric medications without side affects. No complaints of abnormal movements, suicidal ideation or focal weakness.     Objective   There were no vitals taken for this visit.  Physical Exam           The patient (or guardian, if applicable) and other individuals in attendance with the patient were advised that Artificial Intelligence will be utilized during this visit to record and process the conversation to generate a clinical note. The patient (or guardian, if applicable) and other individuals in attendance at the appointment consented to the use of AI, including the recording.      An electronic signature was used to authenticate this note.    --Jay Cook MD

## 2025-03-11 ENCOUNTER — TELEPHONE (OUTPATIENT)
Facility: CLINIC | Age: 60
End: 2025-03-11

## 2025-03-12 DIAGNOSIS — F31.5 BIPOLAR AFFECTIVE DISORDER, DEPRESSED, SEVERE, WITH PSYCHOTIC BEHAVIOR (HCC): Primary | ICD-10-CM

## 2025-03-12 RX ORDER — METHYLPHENIDATE HYDROCHLORIDE 5 MG/1
5 TABLET ORAL DAILY
Qty: 30 TABLET | Refills: 0 | Status: SHIPPED | OUTPATIENT
Start: 2025-03-12 | End: 2025-04-11

## 2025-03-13 SDOH — ECONOMIC STABILITY: FOOD INSECURITY: WITHIN THE PAST 12 MONTHS, YOU WORRIED THAT YOUR FOOD WOULD RUN OUT BEFORE YOU GOT MONEY TO BUY MORE.: NEVER TRUE

## 2025-03-13 SDOH — ECONOMIC STABILITY: INCOME INSECURITY: IN THE LAST 12 MONTHS, WAS THERE A TIME WHEN YOU WERE NOT ABLE TO PAY THE MORTGAGE OR RENT ON TIME?: NO

## 2025-03-13 SDOH — ECONOMIC STABILITY: FOOD INSECURITY: WITHIN THE PAST 12 MONTHS, THE FOOD YOU BOUGHT JUST DIDN'T LAST AND YOU DIDN'T HAVE MONEY TO GET MORE.: NEVER TRUE

## 2025-03-13 SDOH — ECONOMIC STABILITY: TRANSPORTATION INSECURITY
IN THE PAST 12 MONTHS, HAS THE LACK OF TRANSPORTATION KEPT YOU FROM MEDICAL APPOINTMENTS OR FROM GETTING MEDICATIONS?: NO

## 2025-03-13 SDOH — ECONOMIC STABILITY: TRANSPORTATION INSECURITY
IN THE PAST 12 MONTHS, HAS LACK OF TRANSPORTATION KEPT YOU FROM MEETINGS, WORK, OR FROM GETTING THINGS NEEDED FOR DAILY LIVING?: NO

## 2025-03-14 ENCOUNTER — TELEMEDICINE (OUTPATIENT)
Facility: CLINIC | Age: 60
End: 2025-03-14

## 2025-03-14 DIAGNOSIS — F41.1 GAD (GENERALIZED ANXIETY DISORDER): ICD-10-CM

## 2025-03-14 DIAGNOSIS — G40.409 TONIC SEIZURE DISORDER (HCC): ICD-10-CM

## 2025-03-14 DIAGNOSIS — Z00.00 MEDICARE ANNUAL WELLNESS VISIT, SUBSEQUENT: Primary | ICD-10-CM

## 2025-03-14 DIAGNOSIS — F31.9 BIPOLAR DEPRESSION (HCC): ICD-10-CM

## 2025-03-14 DIAGNOSIS — F31.5 BIPOLAR AFFECTIVE DISORDER, DEPRESSED, SEVERE, WITH PSYCHOTIC BEHAVIOR (HCC): ICD-10-CM

## 2025-03-14 DIAGNOSIS — I10 ESSENTIAL HYPERTENSION: ICD-10-CM

## 2025-03-14 DIAGNOSIS — E03.9 ACQUIRED HYPOTHYROIDISM: ICD-10-CM

## 2025-03-14 LAB
ALBUMIN SERPL-MCNC: 4.5 G/DL (ref 3.8–4.9)
ALP SERPL-CCNC: 74 IU/L (ref 44–121)
ALT SERPL-CCNC: 13 IU/L (ref 0–32)
AST SERPL-CCNC: 15 IU/L (ref 0–40)
BASOPHILS # BLD AUTO: 0.1 X10E3/UL (ref 0–0.2)
BASOPHILS NFR BLD AUTO: 1 %
BILIRUB SERPL-MCNC: <0.2 MG/DL (ref 0–1.2)
BUN SERPL-MCNC: 23 MG/DL (ref 8–27)
BUN/CREAT SERPL: 33 (ref 12–28)
CALCIUM SERPL-MCNC: 9.7 MG/DL (ref 8.7–10.3)
CHLORIDE SERPL-SCNC: 108 MMOL/L (ref 96–106)
CO2 SERPL-SCNC: 22 MMOL/L (ref 20–29)
CREAT SERPL-MCNC: 0.69 MG/DL (ref 0.57–1)
EGFRCR SERPLBLD CKD-EPI 2021: 99 ML/MIN/1.73
EOSINOPHIL # BLD AUTO: 0.1 X10E3/UL (ref 0–0.4)
EOSINOPHIL NFR BLD AUTO: 1 %
ERYTHROCYTE [DISTWIDTH] IN BLOOD BY AUTOMATED COUNT: 12.5 % (ref 11.7–15.4)
GLOBULIN SER CALC-MCNC: 2.1 G/DL (ref 1.5–4.5)
GLUCOSE SERPL-MCNC: 62 MG/DL (ref 70–99)
HCT VFR BLD AUTO: 42.1 % (ref 34–46.6)
HGB BLD-MCNC: 13.9 G/DL (ref 11.1–15.9)
IMM GRANULOCYTES # BLD AUTO: 0 X10E3/UL (ref 0–0.1)
IMM GRANULOCYTES NFR BLD AUTO: 0 %
LYMPHOCYTES # BLD AUTO: 1.1 X10E3/UL (ref 0.7–3.1)
LYMPHOCYTES NFR BLD AUTO: 31 %
MCH RBC QN AUTO: 30.8 PG (ref 26.6–33)
MCHC RBC AUTO-ENTMCNC: 33 G/DL (ref 31.5–35.7)
MCV RBC AUTO: 93 FL (ref 79–97)
MONOCYTES # BLD AUTO: 0.4 X10E3/UL (ref 0.1–0.9)
MONOCYTES NFR BLD AUTO: 10 %
NEUTROPHILS # BLD AUTO: 2 X10E3/UL (ref 1.4–7)
NEUTROPHILS NFR BLD AUTO: 57 %
PLATELET # BLD AUTO: 236 X10E3/UL (ref 150–450)
POTASSIUM SERPL-SCNC: 4.1 MMOL/L (ref 3.5–5.2)
PROT SERPL-MCNC: 6.6 G/DL (ref 6–8.5)
RBC # BLD AUTO: 4.52 X10E6/UL (ref 3.77–5.28)
SODIUM SERPL-SCNC: 145 MMOL/L (ref 134–144)
TSH SERPL DL<=0.005 MIU/L-ACNC: 0.39 UIU/ML (ref 0.45–4.5)
WBC # BLD AUTO: 3.6 X10E3/UL (ref 3.4–10.8)

## 2025-03-14 RX ORDER — LEVOTHYROXINE SODIUM 75 UG/1
TABLET ORAL
Qty: 45 TABLET | Refills: 1 | Status: SHIPPED | OUTPATIENT
Start: 2025-03-14

## 2025-03-14 RX ORDER — DIVALPROEX SODIUM 125 MG/1
125 TABLET, DELAYED RELEASE ORAL 2 TIMES DAILY
Qty: 200 TABLET | Refills: 2 | Status: SHIPPED | OUTPATIENT
Start: 2025-03-14

## 2025-03-14 RX ORDER — TOPIRAMATE 50 MG/1
50 TABLET, FILM COATED ORAL 2 TIMES DAILY
Qty: 200 TABLET | Refills: 2 | Status: SHIPPED | OUTPATIENT
Start: 2025-03-14

## 2025-03-14 RX ORDER — MIRTAZAPINE 15 MG/1
15 TABLET, FILM COATED ORAL NIGHTLY
Qty: 100 TABLET | Refills: 2 | Status: SHIPPED | OUTPATIENT
Start: 2025-03-14

## 2025-03-14 RX ORDER — DIVALPROEX SODIUM 250 MG/1
250 TABLET, DELAYED RELEASE ORAL 2 TIMES DAILY
Qty: 200 TABLET | Refills: 2 | Status: SHIPPED | OUTPATIENT
Start: 2025-03-14

## 2025-03-14 RX ORDER — LEVOTHYROXINE SODIUM 88 UG/1
TABLET ORAL
Qty: 50 TABLET | Refills: 1 | Status: SHIPPED | OUTPATIENT
Start: 2025-03-14

## 2025-03-14 RX ORDER — BUSPIRONE HYDROCHLORIDE 30 MG/1
30 TABLET ORAL 2 TIMES DAILY
Qty: 200 TABLET | Refills: 2 | Status: SHIPPED | OUTPATIENT
Start: 2025-03-14

## 2025-03-14 ASSESSMENT — PATIENT HEALTH QUESTIONNAIRE - PHQ9
SUM OF ALL RESPONSES TO PHQ QUESTIONS 1-9: 0
7. TROUBLE CONCENTRATING ON THINGS, SUCH AS READING THE NEWSPAPER OR WATCHING TELEVISION: NOT AT ALL
3. TROUBLE FALLING OR STAYING ASLEEP: NOT AT ALL
10. IF YOU CHECKED OFF ANY PROBLEMS, HOW DIFFICULT HAVE THESE PROBLEMS MADE IT FOR YOU TO DO YOUR WORK, TAKE CARE OF THINGS AT HOME, OR GET ALONG WITH OTHER PEOPLE: NOT DIFFICULT AT ALL
9. THOUGHTS THAT YOU WOULD BE BETTER OFF DEAD, OR OF HURTING YOURSELF: NOT AT ALL
6. FEELING BAD ABOUT YOURSELF - OR THAT YOU ARE A FAILURE OR HAVE LET YOURSELF OR YOUR FAMILY DOWN: NOT AT ALL
5. POOR APPETITE OR OVEREATING: NOT AT ALL
SUM OF ALL RESPONSES TO PHQ QUESTIONS 1-9: 0
1. LITTLE INTEREST OR PLEASURE IN DOING THINGS: NOT AT ALL
4. FEELING TIRED OR HAVING LITTLE ENERGY: NOT AT ALL
8. MOVING OR SPEAKING SO SLOWLY THAT OTHER PEOPLE COULD HAVE NOTICED. OR THE OPPOSITE, BEING SO FIGETY OR RESTLESS THAT YOU HAVE BEEN MOVING AROUND A LOT MORE THAN USUAL: NOT AT ALL
SUM OF ALL RESPONSES TO PHQ QUESTIONS 1-9: 0
SUM OF ALL RESPONSES TO PHQ QUESTIONS 1-9: 0
2. FEELING DOWN, DEPRESSED OR HOPELESS: NOT AT ALL

## 2025-03-14 NOTE — PROGRESS NOTES
Medicare Annual Wellness Visit    Gayla Dash is here for Medication Refill (DMV Placard / Discuss labs)    Assessment & Plan   Medicare annual wellness visit, subsequent  JOSÉ LUIS (generalized anxiety disorder)  -     busPIRone (BUSPAR) 30 MG tablet; Take 30 mg by mouth 2 times daily, Disp-200 tablet, R-2Normal  Tonic seizure disorder (HCC)  -     divalproex (DEPAKOTE) 125 MG DR tablet; Take 1 tablet by mouth 2 times daily, Disp-200 tablet, R-2Normal  -     divalproex (DEPAKOTE) 250 MG DR tablet; Take 1 tablet by mouth 2 times daily, Disp-200 tablet, R-2Normal  -     topiramate (TOPAMAX) 50 MG tablet; Take 1 tablet by mouth 2 times daily, Disp-200 tablet, R-2Normal  -     vitamin D3 (CHOLECALCIFEROL) 125 MCG (5000 UT) TABS tablet; Take 1 tablet by mouth daily, Disp-100 tablet, R-2Normal  Acquired hypothyroidism  -     levothyroxine (SYNTHROID) 75 MCG tablet; 1 tabTues Thurs Sat, Disp-45 tablet, R-1Normal  -     levothyroxine (SYNTHROID) 88 MCG tablet; TAKE 1 TABLET BY MOUTH ON MONDAYS, WEDNESDAYS, FRIDAYS AND SUNDAYS, Disp-50 tablet, R-1Normal  Bipolar affective disorder, depressed, severe, with psychotic behavior (HCC)  Bipolar depression (HCC)  -     mirtazapine (REMERON) 15 MG tablet; Take 1 tablet by mouth nightly, Disp-100 tablet, R-2Normal  Essential hypertension        No follow-ups on file.  June 6, 2025       Subjective   The following acute and/or chronic problems were also addressed today:  Depression/pw    Patient's complete Health Risk Assessment and screening values have been reviewed and are found in Flowsheets. The following problems were reviewed today and where indicated follow up appointments were made and/or referrals ordered.    Positive Risk Factor Screenings with Interventions:        Drug Use:   Substance and Sexual Activity   Drug Use Yes    Types: OTC, Prescription     Interventions:  No recereational drugs                           Objective    Patient-Reported Vitals  No data recorded

## 2025-03-14 NOTE — PROGRESS NOTES
\"Have you been to the ER, urgent care clinic since your last visit?  Hospitalized since your last visit?\"    NO    “Have you seen or consulted any other health care providers outside our system since your last visit?”    NO               Chief Complaint   Patient presents with    Medication Refill     DMV Placard / Discuss labs

## 2025-03-18 ENCOUNTER — TELEPHONE (OUTPATIENT)
Facility: CLINIC | Age: 60
End: 2025-03-18

## 2025-03-19 ENCOUNTER — TELEPHONE (OUTPATIENT)
Facility: CLINIC | Age: 60
End: 2025-03-19

## 2025-03-19 DIAGNOSIS — E03.9 ACQUIRED HYPOTHYROIDISM: ICD-10-CM

## 2025-03-19 RX ORDER — LEVOTHYROXINE SODIUM 88 UG/1
TABLET ORAL
Qty: 50 TABLET | Refills: 1 | Status: SHIPPED | OUTPATIENT
Start: 2025-03-19

## 2025-03-19 RX ORDER — LEVOTHYROXINE SODIUM 75 UG/1
TABLET ORAL
Qty: 45 TABLET | Refills: 1 | Status: SHIPPED | OUTPATIENT
Start: 2025-03-19

## 2025-03-21 ENCOUNTER — RESULTS FOLLOW-UP (OUTPATIENT)
Facility: CLINIC | Age: 60
End: 2025-03-21

## 2025-04-08 DIAGNOSIS — F31.5 BIPOLAR AFFECTIVE DISORDER, DEPRESSED, SEVERE, WITH PSYCHOTIC BEHAVIOR (HCC): ICD-10-CM

## 2025-04-11 NOTE — TELEPHONE ENCOUNTER
PCP: Jay Cook MD    LAST APPT:3/14/2025    Future Appointments   Date Time Provider Department Center   6/6/2025  8:00 AM Jay Cook MD [facility] DEP       Requested Prescriptions     Pending Prescriptions Disp Refills    methylphenidate (RITALIN) 5 MG tablet 30 tablet 0     Sig: Take 1 tablet by mouth daily for 30 days. Needs appointment to refill again Max Daily Amount: 5 mg

## 2025-04-12 RX ORDER — METHYLPHENIDATE HYDROCHLORIDE 5 MG/1
5 TABLET ORAL DAILY
Qty: 33 TABLET | Refills: 0 | Status: SHIPPED | OUTPATIENT
Start: 2025-04-12 | End: 2025-05-15

## 2025-05-13 DIAGNOSIS — F31.5 BIPOLAR AFFECTIVE DISORDER, DEPRESSED, SEVERE, WITH PSYCHOTIC BEHAVIOR (HCC): ICD-10-CM

## 2025-05-14 NOTE — TELEPHONE ENCOUNTER
PCP: Jay Cook MD    LAST APPT:3/14/2025    Future Appointments   Date Time Provider Department Center   6/6/2025  8:00 AM Jay Cook MD [facility] DEP       Requested Prescriptions     Pending Prescriptions Disp Refills    methylphenidate (RITALIN) 5 MG tablet 33 tablet 0     Sig: Take 1 tablet by mouth daily for 33 days. Max Daily Amount: 5 mg

## 2025-05-15 RX ORDER — METHYLPHENIDATE HYDROCHLORIDE 5 MG/1
5 TABLET ORAL DAILY
Qty: 33 TABLET | Refills: 0 | Status: SHIPPED | OUTPATIENT
Start: 2025-05-15 | End: 2025-06-17

## 2025-06-06 ENCOUNTER — TELEMEDICINE (OUTPATIENT)
Facility: CLINIC | Age: 60
End: 2025-06-06
Payer: MEDICARE

## 2025-06-06 DIAGNOSIS — G40.409 TONIC SEIZURE DISORDER (HCC): Primary | ICD-10-CM

## 2025-06-06 DIAGNOSIS — F31.5 BIPOLAR AFFECTIVE DISORDER, DEPRESSED, SEVERE, WITH PSYCHOTIC BEHAVIOR (HCC): ICD-10-CM

## 2025-06-06 DIAGNOSIS — F41.1 GAD (GENERALIZED ANXIETY DISORDER): ICD-10-CM

## 2025-06-06 DIAGNOSIS — E03.9 ACQUIRED HYPOTHYROIDISM: ICD-10-CM

## 2025-06-06 PROCEDURE — 99214 OFFICE O/P EST MOD 30 MIN: CPT | Performed by: FAMILY MEDICINE

## 2025-06-06 RX ORDER — LEVOTHYROXINE SODIUM 75 UG/1
TABLET ORAL
Qty: 45 TABLET | Refills: 1 | Status: SHIPPED | OUTPATIENT
Start: 2025-06-06

## 2025-06-06 RX ORDER — DIVALPROEX SODIUM 250 MG/1
500 TABLET, DELAYED RELEASE ORAL 2 TIMES DAILY
Qty: 400 TABLET | Refills: 1 | Status: SHIPPED | OUTPATIENT
Start: 2025-06-06

## 2025-06-06 RX ORDER — LEVOTHYROXINE SODIUM 88 UG/1
TABLET ORAL
Qty: 50 TABLET | Refills: 1 | Status: SHIPPED | OUTPATIENT
Start: 2025-06-06

## 2025-06-06 RX ORDER — METHYLPHENIDATE HYDROCHLORIDE 5 MG/1
5 TABLET ORAL DAILY
Qty: 33 TABLET | Refills: 0 | Status: SHIPPED | OUTPATIENT
Start: 2025-06-06 | End: 2025-07-09

## 2025-06-06 ASSESSMENT — PATIENT HEALTH QUESTIONNAIRE - PHQ9
1. LITTLE INTEREST OR PLEASURE IN DOING THINGS: NOT AT ALL
8. MOVING OR SPEAKING SO SLOWLY THAT OTHER PEOPLE COULD HAVE NOTICED. OR THE OPPOSITE, BEING SO FIGETY OR RESTLESS THAT YOU HAVE BEEN MOVING AROUND A LOT MORE THAN USUAL: NOT AT ALL
5. POOR APPETITE OR OVEREATING: NOT AT ALL
9. THOUGHTS THAT YOU WOULD BE BETTER OFF DEAD, OR OF HURTING YOURSELF: NOT AT ALL
SUM OF ALL RESPONSES TO PHQ QUESTIONS 1-9: 0
4. FEELING TIRED OR HAVING LITTLE ENERGY: NOT AT ALL
10. IF YOU CHECKED OFF ANY PROBLEMS, HOW DIFFICULT HAVE THESE PROBLEMS MADE IT FOR YOU TO DO YOUR WORK, TAKE CARE OF THINGS AT HOME, OR GET ALONG WITH OTHER PEOPLE: NOT DIFFICULT AT ALL
SUM OF ALL RESPONSES TO PHQ QUESTIONS 1-9: 0
SUM OF ALL RESPONSES TO PHQ QUESTIONS 1-9: 0
6. FEELING BAD ABOUT YOURSELF - OR THAT YOU ARE A FAILURE OR HAVE LET YOURSELF OR YOUR FAMILY DOWN: NOT AT ALL
7. TROUBLE CONCENTRATING ON THINGS, SUCH AS READING THE NEWSPAPER OR WATCHING TELEVISION: NOT AT ALL
2. FEELING DOWN, DEPRESSED OR HOPELESS: NOT AT ALL
3. TROUBLE FALLING OR STAYING ASLEEP: NOT AT ALL
SUM OF ALL RESPONSES TO PHQ QUESTIONS 1-9: 0

## 2025-06-06 NOTE — PROGRESS NOTES
Have you been to the ER, urgent care clinic since your last visit?  Hospitalized since your last visit?   NO    Have you seen or consulted any other health care providers outside our system since your last visit?   NO               Chief Complaint   Patient presents with    Hypothyroidism       
Narrative    Lives with  aide stays with her in the day     Past Surgical History:   Procedure Laterality Date    CARPAL TUNNEL RELEASE Right     COLONOSCOPY N/A 2022    COLONOSCOPY performed by Boo Taveras MD at [facility] ENDOSCOPY    OTHER SURGICAL HISTORY      Facial reconstruction    OTHER SURGICAL HISTORY      Feeding tube in past    OTHER SURGICAL HISTORY      Chest tube in past    OTHER SURGICAL HISTORY Left     torn retina    OTHER SURGICAL HISTORY      Plantar facitis    TRACHEOSTOMY       Family History   Problem Relation Age of Onset    Dementia Father     High Cholesterol Father     Breast Cancer Maternal Grandmother              Social History     Socioeconomic History    Marital status:      Spouse name: Not on file    Number of children: Not on file    Years of education: Not on file    Highest education level: Not on file   Occupational History    Not on file   Tobacco Use    Smoking status: Former    Smokeless tobacco: Never   Substance and Sexual Activity    Alcohol use: No    Drug use: Yes     Types: OTC, Prescription    Sexual activity: Yes     Partners: Male   Other Topics Concern    Not on file   Social History Narrative    Lives with  aide stays with her in the day     Social Drivers of Health     Financial Resource Strain: Low Risk  (2024)    Overall Financial Resource Strain (CARDIA)     Difficulty of Paying Living Expenses: Not hard at all   Food Insecurity: Not on file (3/13/2025)   Transportation Needs: Unknown (2024)    PRAPARE - Transportation     Lack of Transportation (Medical): Not on file     Lack of Transportation (Non-Medical): No   Physical Activity: Insufficiently Active (4/15/2024)    Exercise Vital Sign     Days of Exercise per Week: 5 days     Minutes of Exercise per Session: 20 min   Stress: Not on file   Social Connections: Not on file   Intimate Partner Violence: Not on file   Housing Stability: Unknown (3/13/2025)    Housing Stability

## 2025-06-16 ENCOUNTER — TELEPHONE (OUTPATIENT)
Facility: CLINIC | Age: 60
End: 2025-06-16

## 2025-06-16 DIAGNOSIS — F31.5 BIPOLAR AFFECTIVE DISORDER, DEPRESSED, SEVERE, WITH PSYCHOTIC BEHAVIOR (HCC): ICD-10-CM

## 2025-06-24 RX ORDER — METHYLPHENIDATE HYDROCHLORIDE 5 MG/1
5 TABLET ORAL DAILY
Qty: 33 TABLET | Refills: 0 | Status: CANCELLED | OUTPATIENT
Start: 2025-06-24 | End: 2025-07-27

## 2025-06-26 DIAGNOSIS — F31.5 BIPOLAR AFFECTIVE DISORDER, DEPRESSED, SEVERE, WITH PSYCHOTIC BEHAVIOR (HCC): ICD-10-CM

## 2025-06-26 RX ORDER — METHYLPHENIDATE HYDROCHLORIDE 5 MG/1
5 TABLET ORAL DAILY
Qty: 33 TABLET | Refills: 0 | Status: SHIPPED | OUTPATIENT
Start: 2025-06-26 | End: 2025-07-29

## 2025-07-16 NOTE — MR AVS SNAPSHOT
[address] [facility] [address], 
[id number], [address] [facility] 
[phone] Patient: Ezekiel Davis MRN: [id number] :1965 Visit Information Date & Time Provider Department Dept. Phone Encounter #  
 2018 11:00 AM Michaela Swanson MD [facility] [phone] [id number] Follow-up Instructions Return in about 1 month (around 2018) for memory loss, tremors. Routing History Your Appointments 2018  9:00 AM  
Nurse Visit with NURSE [facility] Julio Cesar Hernandez (SHERLYN SCHEDULING) Appt Note: tsa level/18s  
 [address] [place] [address]  
[phone]  
  
   
 [address] [facility] [address] Upcoming Health Maintenance Date Due Influenza Age 5 to Adult 2018 FOBT Q 1 YEAR AGE 50-75 2019 MEDICARE YEARLY EXAM 2019 BREAST CANCER SCRN MAMMOGRAM 2019 PAP AKA CERVICAL CYTOLOGY 2020 DTaP/Tdap/Td series (2 - Td) 2027 Allergies as of 2018  Review Complete On: 2018 By: Michaela Swanson MD  
  
 Severity Noted Reaction Type Reactions Penicillins  10/23/2017    Rash Current Immunizations  Reviewed on 2018 Name Date Pneumococcal Conjugate (PCV-13) 2018 Not reviewed this visit You Were Diagnosed With   
  
 Codes Comments Encephalopathy    -  Primary ICD-10-CM: G93.40 ICD-9-CM: 348.30 Medication-induced movement disorder     ICD-10-CM: G25.70 ICD-9-CM: 333.90, E980.5 Vitals BP Pulse Height(growth percentile) Weight(growth percentile) SpO2 BMI  
 140/82 77 5' 3\" (1.6 m) 167 lb 12.8 oz (76.1 kg) 98% 29.72 kg/m2 OB Status Smoking Status Postmenopausal Never Smoker BMI and BSA Data  Body Mass Index Body Surface Area  
 29.72 kg/m 2 1.84 m 2  
  
  
 [FreeTextEntry1] : followup labs  us doppler needs compliance with meds see gastro routine cardio followup recommended  follow up 1month Preferred Pharmacy Pharmacy Name Phone [facility] [phone] Your Updated Medication List  
  
   
This list is accurate as of 5/31/18 11:57 AM.  Always use your most recent med list.  
  
  
  
  
 aspirin 81 mg chewable tablet Take 81 mg by mouth daily. atorvastatin 20 mg tablet Commonly known as:  LIPITOR Take 1 Tab by mouth daily. busPIRone 30 mg tablet Commonly known as:  BUSPAR Take 60 mg by mouth two (2) times a day. fluticasone 50 mcg/actuation nasal spray Commonly known as:  FLONASE  
TAke 2 sprays each nostril once a day * GEODON 20 mg capsule Generic drug:  ziprasidone Take 80 mg by mouth daily (with breakfast). * ziprasidone hcl 60 mg capsule Commonly known as:  Fayrene Back Take 60 mg by mouth daily. * lamoTRIgine 100 mg tablet Commonly known as: LaMICtal  
Take 300 mg by mouth daily. * lamoTRIgine 200 mg tablet Commonly known as: LaMICtal  
take 1 tablet by mouth once daily TAKE WITH 100 milligram tablet  
  
 levocetirizine 5 mg tablet Commonly known as:  Lindy Slack Take 1 Tab by mouth daily. levothyroxine 100 mcg tablet Commonly known as:  SYNTHROID Take 1 Tab by mouth Daily (before breakfast). losartan 25 mg tablet Commonly known as:  COZAAR  
take 1 tablet by mouth once daily  
  
 rizatriptan 10 mg tablet Commonly known as:  Carey Lazar Take 1 Tab by mouth once as needed for Migraine for up to 1 dose. May repeat in 2 hours if needed  
  
 triamcinolone acetonide 0.1 % dental paste Commonly known as:  KENALOG  
apply to affected area three times a day VIIBRYD 40 mg Tab tablet Generic drug:  vilazodone Take 40 mg by mouth daily. zolpidem CR 12.5 mg tablet Commonly known as:  AMBIEN CR  
  
 * Notice: This list has 4 medication(s) that are the same as other medications prescribed for you.  Read the directions carefully, and ask your doctor or other care provider to review them with you. Follow-up Instructions Return in about 1 month (around 6/30/2018) for memory loss, tremors. Patient Instructions A Healthy Lifestyle: Care Instructions Your Care Instructions A healthy lifestyle can help you feel good, stay at a healthy weight, and have plenty of energy for both work and play. A healthy lifestyle is something you can share with your whole family. A healthy lifestyle also can lower your risk for serious health problems, such as high blood pressure, heart disease, and diabetes. You can follow a few steps listed below to improve your health and the health of your family. Follow-up care is a key part of your treatment and safety. Be sure to make and go to all appointments, and call your doctor if you are having problems. It's also a good idea to know your test results and keep a list of the medicines you take. How can you care for yourself at home? · Do not eat too much sugar, fat, or fast foods. You can still have dessert and treats now and then. The goal is moderation. · Start small to improve your eating habits. Pay attention to portion sizes, drink less juice and soda pop, and eat more fruits and vegetables. ¨ Eat a healthy amount of food. A 3-ounce serving of meat, for example, is about the size of a deck of cards. Fill the rest of your plate with vegetables and whole grains. ¨ Limit the amount of soda and sports drinks you have every day. Drink more water when you are thirsty. ¨ Eat at least 5 servings of fruits and vegetables every day. It may seem like a lot, but it is not hard to reach this goal. A serving or helping is 1 piece of fruit, 1 cup of vegetables, or 2 cups of leafy, raw vegetables. Have an apple or some carrot sticks as an afternoon snack instead of a candy bar.  Try to have fruits and/or vegetables at every meal. 
 · Make exercise part of your daily routine. You may want to start with simple activities, such as walking, bicycling, or slow swimming. Try to be active 30 to 60 minutes every day. You do not need to do all 30 to 60 minutes all at once. For example, you can exercise 3 times a day for 10 or 20 minutes. Moderate exercise is safe for most people, but it is always a good idea to talk to your doctor before starting an exercise program. 
· Keep moving. Montrell Handsome the lawn, work in the garden, or [employer]. Take the stairs instead of the elevator at work. · If you smoke, quit. People who smoke have an increased risk for heart attack, stroke, cancer, and other lung illnesses. Quitting is hard, but there are ways to boost your chance of quitting tobacco for good. ¨ Use nicotine gum, patches, or lozenges. ¨ Ask your doctor about stop-smoking programs and medicines. ¨ Keep trying. In addition to reducing your risk of diseases in the future, you will notice some benefits soon after you stop using tobacco. If you have shortness of breath or asthma symptoms, they will likely get better within a few weeks after you quit. · Limit how much alcohol you drink. Moderate amounts of alcohol (up to 2 drinks a day for men, 1 drink a day for women) are okay. But drinking too much can lead to liver problems, high blood pressure, and other health problems. Family health If you have a family, there are many things you can do together to improve your health. · Eat meals together as a family as often as possible. · Eat healthy foods. This includes fruits, vegetables, lean meats and dairy, and whole grains. · Include your family in your fitness plan. Most people think of activities such as jogging or tennis as the way to fitness, but there are many ways you and your family can be more active. Anything that makes you breathe hard and gets your heart pumping is exercise. Here are some tips: ¨ Walk to do errands or to take your child to school or the bus. ¨ Go for a family bike ride after dinner instead of watching TV. Where can you learn more? Go to http://alivia-maritza.info/. Enter M913 in the search box to learn more about \"A Healthy Lifestyle: Care Instructions. \" Current as of: May 12, 2017 Content Version: 11.4 © 7129-0859 [employer]. Care instructions adapted under license by [employer] (which disclaims liability or warranty for this information). If you have questions about a medical condition or this instruction, always ask your healthcare professional. [address] any warranty or liability for your use of this information. Introducing [facility] & HEALTH SERVICES! Dear Oneal Do: Thank you for requesting a [facility] account. Our records indicate that you already have an active [facility] account. You can access your account anytime at [email or web address] Did you know that you can access your hospital and ER discharge instructions at any time in [facility]? You can also review all of your test results from your hospital stay or ER visit. Additional Information If you have questions, please visit the Frequently Asked Questions section of the [facility] website at [email or web address] Remember, [facility] is NOT to be used for urgent needs. For medical emergencies, dial 911. Now available from your iPhone and Android! Please provide this summary of care documentation to your next provider. Your primary care clinician is listed as Allen Mejía. If you have any questions after today's visit, please call [phone].

## 2025-07-22 ENCOUNTER — TELEPHONE (OUTPATIENT)
Facility: CLINIC | Age: 60
End: 2025-07-22

## 2025-07-22 DIAGNOSIS — F31.5 BIPOLAR AFFECTIVE DISORDER, DEPRESSED, SEVERE, WITH PSYCHOTIC BEHAVIOR (HCC): ICD-10-CM

## 2025-07-22 RX ORDER — METHYLPHENIDATE HYDROCHLORIDE 5 MG/1
5 TABLET ORAL DAILY
Qty: 35 TABLET | Refills: 0 | Status: SHIPPED | OUTPATIENT
Start: 2025-07-22 | End: 2025-08-26

## 2025-07-22 NOTE — TELEPHONE ENCOUNTER
Pt  calling to get refill called in to [facility]      methylphenidate (RITALIN) 5 MG tablet

## 2025-08-29 DIAGNOSIS — F31.9 BIPOLAR DEPRESSION (HCC): Primary | ICD-10-CM

## 2025-08-29 RX ORDER — METHYLPHENIDATE HYDROCHLORIDE 5 MG/1
5 TABLET ORAL DAILY
Qty: 30 TABLET | Refills: 0 | Status: SHIPPED | OUTPATIENT
Start: 2025-08-29 | End: 2025-09-28

## (undated) DEVICE — Device

## (undated) DEVICE — SET GRAV CK VLV NEEDLESS ST 3 GANGED 4WAY STPCOCK HI FLO 10

## (undated) DEVICE — BLUNTFILL WITH FILTER: Brand: MONOJECT

## (undated) DEVICE — BAG BELONG PT PERS CLEAR HANDL

## (undated) DEVICE — BLUNTFILL: Brand: MONOJECT

## (undated) DEVICE — BASIN EMSIS 16OZ GRAPHITE PLAS KID SHP MOLD GRAD FOR ORAL

## (undated) DEVICE — SYR 5ML 1/5 GRAD LL NSAF LF --

## (undated) DEVICE — SOLIDIFIER MEDC 1200ML -- CONVERT TO 356117

## (undated) DEVICE — SYR 3ML LL TIP 1/10ML GRAD --

## (undated) DEVICE — CANNULA CUSH AD W/ 14FT TBG

## (undated) DEVICE — SIMPLICITY FLUFF UNDERPAD 23X36, MODERATE: Brand: SIMPLICITY

## (undated) DEVICE — ADULT SPO2 SENSOR,REMANUFACTURED,REPROCESSED DEVICE FOR SINGLE USE; REPROCESSED BY COVIDIEN LLC: Brand: NELLCOR

## (undated) DEVICE — 3M™ CUROS™ DISINFECTING CAP FOR NEEDLELESS CONNECTORS 270/CARTON 20 CARTONS/CASE CFF1-270: Brand: CUROS™

## (undated) DEVICE — BITEBLOCK ENDOSCP 60FR MAXI WHT POLYETH STURDY W/ VELC WVN

## (undated) DEVICE — 1200 GUARD II KIT W/5MM TUBE W/O VAC TUBE: Brand: GUARDIAN

## (undated) DEVICE — KIT COLON W/ 1.1OZ LUB AND 2 END

## (undated) DEVICE — CUFF RMFG BP INF SZ 11 DISP -- LAWSON OEM ITEM 238915

## (undated) DEVICE — CATH IV AUTOGRD BC PNK 20GA 25 -- INSYTE

## (undated) DEVICE — ELECTRODE,RADIOTRANSLUCENT,FOAM,3PK: Brand: MEDLINE